# Patient Record
Sex: MALE | Race: WHITE | NOT HISPANIC OR LATINO | Employment: OTHER | URBAN - NONMETROPOLITAN AREA
[De-identification: names, ages, dates, MRNs, and addresses within clinical notes are randomized per-mention and may not be internally consistent; named-entity substitution may affect disease eponyms.]

---

## 2017-03-15 ENCOUNTER — OFFICE VISIT (OUTPATIENT)
Dept: CARDIOLOGY | Facility: CLINIC | Age: 72
End: 2017-03-15

## 2017-03-15 VITALS
DIASTOLIC BLOOD PRESSURE: 60 MMHG | WEIGHT: 166 LBS | HEART RATE: 41 BPM | SYSTOLIC BLOOD PRESSURE: 96 MMHG | HEIGHT: 70 IN | BODY MASS INDEX: 23.77 KG/M2

## 2017-03-15 DIAGNOSIS — Z79.899 MEDICATION MANAGEMENT: ICD-10-CM

## 2017-03-15 DIAGNOSIS — I48.0 PAROXYSMAL ATRIAL FIBRILLATION (HCC): Primary | ICD-10-CM

## 2017-03-15 DIAGNOSIS — R00.1 BRADYCARDIA: ICD-10-CM

## 2017-03-15 DIAGNOSIS — E11.9 CONTROLLED TYPE 2 DIABETES MELLITUS WITHOUT COMPLICATION, WITHOUT LONG-TERM CURRENT USE OF INSULIN (HCC): ICD-10-CM

## 2017-03-15 DIAGNOSIS — Z72.0 CHEWING TOBACCO USE: ICD-10-CM

## 2017-03-15 DIAGNOSIS — I49.5 SSS (SICK SINUS SYNDROME) (HCC): ICD-10-CM

## 2017-03-15 PROCEDURE — 93000 ELECTROCARDIOGRAM COMPLETE: CPT | Performed by: NURSE PRACTITIONER

## 2017-03-15 PROCEDURE — 99214 OFFICE O/P EST MOD 30 MIN: CPT | Performed by: NURSE PRACTITIONER

## 2017-03-15 NOTE — PROGRESS NOTES
Chief Complaint   Patient presents with   • Follow-up     Denies any cardiac problems.    • Med Refill     [facility]        Subjective       Pavel Claudio is a 71 y.o. male with a history of nonobstructive coronary artery disease and bradycardia. In 2015, he was seen in the office to establish cardiac care. His last stress test showed possible ischemia of the inferior wall and apex. He reports he underwent a cardiac catheterization and no significant blockage was noted. According to his office record, he has remained asymptomatic with bradycardia and hypotension. At his last office visit an extended monitor was placed. Sinus bradycardia was reported without symptoms. No PAF was noted. He continues to be managed conservatively. Anticoagulation therapy was not started due to bleeding risk and remaining in sinus    HPI         Cardiac History:    Past Surgical History   Procedure Laterality Date   • Echo - converted  04/16/2014     heart and vascular Dr FLORES EF 65%, mild AR   • Cardiovascular stress test  04/21/2014     Dr FLORES EF 58%, pos for ischemia    • Converted (historical) holter  03/04/2015     16 beat run of PAF baseline sinus dago at 50, no symptoms recorded   • Other surgical history  2015     Ecardio- sinus dago, continue observation       Current Outpatient Prescriptions   Medication Sig Dispense Refill   • alfuzosin (UROXATRAL) 10 MG 24 hr tablet Take 10 mg by mouth daily.     • dicyclomine (BENTYL) 20 MG tablet Take 20 mg by mouth daily.     • lansoprazole (PREVACID) 30 MG capsule Take 30 mg by mouth 2 (two) times a day.     • metFORMIN (GLUCOPHAGE) 500 MG tablet Take 500 mg by mouth daily with breakfast.     • Omega-3 Fatty Acids (OMEGA 3 500 PO) Take  by mouth. With Jesus red also     • pregabalin (LYRICA) 150 MG capsule Take 150 mg by mouth daily.     • sertraline (ZOLOFT) 100 MG tablet Take 100 mg by mouth daily.     • ZOLPIDEM TARTRATE PO Take 10 mg by mouth  "every night.     • apixaban (ELIQUIS) 5 MG tablet tablet Take 1 tablet by mouth 2 (Two) Times a Day. 60 tablet 0     No current facility-administered medications for this visit.        Review of patient's allergies indicates no known allergies.    Past Medical History   Diagnosis Date   • Anemia    • Anxiety    • BPH (benign prostatic hyperplasia)    • CAD (coronary artery disease)    • Cheekbone fracture      surgical repair    • Diabetes mellitus    • GERD (gastroesophageal reflux disease)    • H/O foot surgery      left foot   • History of hernia surgery    • PAF (paroxysmal atrial fibrillation)        Social History     Social History   • Marital status:      Spouse name: N/A   • Number of children: N/A   • Years of education: N/A     Occupational History   • Not on file.     Social History Main Topics   • Smoking status: Never Smoker   • Smokeless tobacco: Current User     Types: Chew   • Alcohol use No   • Drug use: No   • Sexual activity: Not on file     Other Topics Concern   • Not on file     Social History Narrative       Family History   Problem Relation Age of Onset   • Lung disease Mother        Review of Systems   Constitutional: Negative.    HENT: Negative.    Eyes: Negative.    Cardiovascular: Negative.    Gastrointestinal: Negative.    Endocrine: Negative.    Genitourinary: Negative.    Musculoskeletal: Negative.    Skin: Negative.    Neurological: Negative.    Hematological: Negative.    Psychiatric/Behavioral: Negative.        Diabetes- Yes  Thyroid-normal    Objective     Visit Vitals   • BP 96/60   • Pulse (!) 41   • Ht 70\" (177.8 cm)   • Wt 166 lb (75.3 kg)   • BMI 23.82 kg/m2       Physical Exam   Eyes: Pupils are equal, round, and reactive to light.   Neck: Neck supple. No JVD present.   Cardiovascular: S1 normal, S2 normal and normal pulses.  An irregular rhythm present. Bradycardia present.    Murmur heard.   Systolic murmur is present with a grade of 2/6   Pulmonary/Chest: Effort " normal and breath sounds normal.   Abdominal: Soft. Bowel sounds are normal.   Vitals reviewed.      ECG 12 Lead  Date/Time: 3/15/2017 12:38 PM  Performed by: KIM DIXON  Authorized by: KIM DIXON   Comparison: compared with previous ECG from 9/21/2016  Comparison to previous ECG: Sinus dago  Rhythm: atrial fibrillation  BPM: 41                  Assessment/Plan      Pavel was seen today for follow-up and med refill.    Diagnoses and all orders for this visit:    Paroxysmal atrial fibrillation  -     ECG 12 Lead    SSS (sick sinus syndrome)  -     ECG 12 Lead    Medication management    Bradycardia  -     ECG 12 Lead    Controlled type 2 diabetes mellitus without complication, without long-term current use of insulin    Chewing tobacco use    Other orders  -     apixaban (ELIQUIS) 5 MG tablet tablet; Take 1 tablet by mouth 2 (Two) Times a Day.        Mr. Claudio remains completely asymptomatic. His EKG today shows atrial fibrillation with slow ventricular response. Blood pressure is in his normal range. We discussed treatment options for atrial fibrillation. If arrhythmia persist cardioversion could be option but Mr. Claudio does not want to undergo intervention as long as he is asymptomatic. He wants to manage with medication.  Due to REFUGIO-VASc score of 2 recommend anticoagulation therapy for stoke prevention. Handouts were given regarding PAF and treatment. Samples of Eliquis 5 mg to take bid were given to him. [facility] medication and for management of labs. He understands to stop aspirin when starts Eliquis. No further cardiac testing ordered at this time. He understands to call for any problems or concerns.            Electronically signed by RAISSA Pham,  March 16, 2017 11:57 AM

## 2017-04-12 ENCOUNTER — TELEPHONE (OUTPATIENT)
Dept: CARDIOLOGY | Facility: CLINIC | Age: 72
End: 2017-04-12

## 2017-04-13 ENCOUNTER — TELEPHONE (OUTPATIENT)
Dept: CARDIOLOGY | Facility: CLINIC | Age: 72
End: 2017-04-13

## 2017-05-05 ENCOUNTER — DOCUMENTATION (OUTPATIENT)
Dept: CARDIOLOGY | Facility: CLINIC | Age: 72
End: 2017-05-05

## 2017-05-22 ENCOUNTER — TELEPHONE (OUTPATIENT)
Dept: CARDIOLOGY | Facility: CLINIC | Age: 72
End: 2017-05-22

## 2017-05-23 ENCOUNTER — TELEPHONE (OUTPATIENT)
Dept: CARDIOLOGY | Facility: CLINIC | Age: 72
End: 2017-05-23

## 2017-07-26 ENCOUNTER — DOCUMENTATION (OUTPATIENT)
Dept: CARDIOLOGY | Facility: CLINIC | Age: 72
End: 2017-07-26

## 2017-07-26 NOTE — PROGRESS NOTES
Received call from assistance for Eliquis stating that patient has been approved through December 2017.

## 2017-09-20 ENCOUNTER — OFFICE VISIT (OUTPATIENT)
Dept: CARDIOLOGY | Facility: CLINIC | Age: 72
End: 2017-09-20

## 2017-09-20 VITALS
DIASTOLIC BLOOD PRESSURE: 80 MMHG | BODY MASS INDEX: 23.77 KG/M2 | HEIGHT: 70 IN | SYSTOLIC BLOOD PRESSURE: 120 MMHG | WEIGHT: 166 LBS | HEART RATE: 42 BPM

## 2017-09-20 DIAGNOSIS — R00.1 SINUS BRADYCARDIA: ICD-10-CM

## 2017-09-20 DIAGNOSIS — E11.9 CONTROLLED TYPE 2 DIABETES MELLITUS WITHOUT COMPLICATION, WITHOUT LONG-TERM CURRENT USE OF INSULIN (HCC): ICD-10-CM

## 2017-09-20 DIAGNOSIS — Z79.01 CURRENT USE OF LONG TERM ANTICOAGULATION: ICD-10-CM

## 2017-09-20 DIAGNOSIS — I44.0 FIRST DEGREE AV BLOCK: ICD-10-CM

## 2017-09-20 DIAGNOSIS — I48.0 PAROXYSMAL ATRIAL FIBRILLATION (HCC): Primary | ICD-10-CM

## 2017-09-20 PROCEDURE — 93000 ELECTROCARDIOGRAM COMPLETE: CPT | Performed by: NURSE PRACTITIONER

## 2017-09-20 PROCEDURE — 99213 OFFICE O/P EST LOW 20 MIN: CPT | Performed by: NURSE PRACTITIONER

## 2017-09-20 RX ORDER — ZOLPIDEM TARTRATE 5 MG/1
5 TABLET ORAL NIGHTLY PRN
COMMUNITY
End: 2018-03-21 | Stop reason: DRUGHIGH

## 2017-09-20 NOTE — PROGRESS NOTES
"Chief Complaint   Patient presents with   • Follow-up     cardiac management, no recent labs, patient did not bring in medication list with visit, states nothing has changed. instructed to call back with list.   • Dizziness     \"at times\"       Subjective       Pavel Claudio is a 71 y.o. male  with a history of nonobstructive coronary artery disease and bradycardia. In 2015, he was seen in the office to establish cardiac care. His last stress test showed possible ischemia of the inferior wall and apex. He reports he underwent a cardiac catheterization and no significant blockage was noted. According to his office record, he has remained asymptomatic with bradycardia and hypotension. In 2016, an extended monitor was placed. Sinus bradycardia was reported without symptoms. No PAF was noted. He continues to be managed conservatively. At his last office visit his EKG showed atrial fibrillation. Eliquis was started. He did not want cardioversion if remained in atrial fib.   Today he comes to the office without cardiac complaints. He has recently undergone dental surgery with placement of implants. His appetite is stable and he is now able to eat more foods.     HPI         Cardiac History:    Past Surgical History:   Procedure Laterality Date   • CARDIOVASCULAR STRESS TEST  04/21/2014    Dr SANDRA GÓMEZ 58%, pos for ischemia    • CONVERTED (HISTORICAL) HOLTER  03/04/2015    16 beat run of PAF baseline sinus dago at 50, no symptoms recorded   • ECHO - CONVERTED  04/16/2014    heart and vascular Dr SANDRA GÓMEZ 65%, mild AR   • OTHER SURGICAL HISTORY  2015    Ecardio- sinus dago, continue observation       Current Outpatient Prescriptions   Medication Sig Dispense Refill   • alfuzosin (UROXATRAL) 10 MG 24 hr tablet Take 10 mg by mouth daily.     • apixaban (ELIQUIS) 5 MG tablet tablet Take 1 tablet by mouth 2 (Two) Times a Day. 28 tablet 0   • dicyclomine (BENTYL) 20 MG tablet Take 20 mg by mouth daily.     • lansoprazole (PREVACID) 30 " MG capsule Take 30 mg by mouth 2 (two) times a day.     • metFORMIN (GLUCOPHAGE) 500 MG tablet Take 500 mg by mouth daily with breakfast.     • Omega-3 Fatty Acids (OMEGA 3 500 PO) Take  by mouth. With Jesus red also     • pregabalin (LYRICA) 150 MG capsule Take 150 mg by mouth daily.     • sertraline (ZOLOFT) 100 MG tablet Take 100 mg by mouth daily.     • zolpidem (AMBIEN) 5 MG tablet Take 5 mg by mouth At Night As Needed for Sleep.       No current facility-administered medications for this visit.        Review of patient's allergies indicates no known allergies.    Past Medical History:   Diagnosis Date   • Anemia    • Anxiety    • BPH (benign prostatic hyperplasia)    • CAD (coronary artery disease)    • Cheekbone fracture     surgical repair    • Diabetes mellitus    • GERD (gastroesophageal reflux disease)    • H/O foot surgery     left foot   • History of hernia surgery    • PAF (paroxysmal atrial fibrillation)        Social History     Social History   • Marital status:      Spouse name: N/A   • Number of children: N/A   • Years of education: N/A     Occupational History   • Not on file.     Social History Main Topics   • Smoking status: Never Smoker   • Smokeless tobacco: Current User     Types: Chew      Comment: patient counseled on effectsof tobacco use on health.    • Alcohol use No   • Drug use: No   • Sexual activity: Not on file     Other Topics Concern   • Not on file     Social History Narrative       Family History   Problem Relation Age of Onset   • Lung disease Mother        Review of Systems   Constitution: Negative for decreased appetite and malaise/fatigue.   HENT: Negative for congestion, nosebleeds and sore throat.    Eyes: Negative for blurred vision.   Cardiovascular: Negative for chest pain, claudication, dyspnea on exertion, irregular heartbeat, leg swelling, near-syncope, palpitations and paroxysmal nocturnal dyspnea.   Respiratory: Negative for shortness of breath and sleep  "disturbances due to breathing.    Endocrine: Negative for cold intolerance and heat intolerance.   Hematologic/Lymphatic: Negative for adenopathy. Does not bruise/bleed easily.   Skin: Negative for itching and nail changes.   Musculoskeletal: Negative for arthritis and myalgias.   Gastrointestinal: Positive for constipation (at times, controls with stool softener). Negative for abdominal pain, dysphagia, heartburn and melena.   Genitourinary: Negative for frequency and hematuria.   Neurological: Negative for dizziness, light-headedness and seizures.   Psychiatric/Behavioral: Negative for altered mental status and memory loss. The patient does not have insomnia and is not nervous/anxious.    Allergic/Immunologic: Negative for hives.   Diabetes- Yes  Thyroid-normal    Objective     /80 (BP Location: Right arm)  Pulse (!) 42  Ht 70\" (177.8 cm)  Wt 166 lb (75.3 kg)  BMI 23.82 kg/m2    Physical Exam   Constitutional: He is oriented to person, place, and time. He appears well-nourished.   HENT:   Head: Normocephalic.   Eyes: Conjunctivae are normal. Pupils are equal, round, and reactive to light.   Neck: Normal range of motion. Carotid bruit is not present.   Cardiovascular: Regular rhythm, S1 normal, S2 normal and normal pulses.  Bradycardia present.    No murmur heard.  Pulmonary/Chest: Breath sounds normal. He has no wheezes. He has no rales.   Abdominal: Soft. Bowel sounds are normal. He exhibits no distension. There is no tenderness.   Musculoskeletal: Normal range of motion.   Neurological: He is alert and oriented to person, place, and time.   Skin: Skin is warm. No rash noted. No pallor.   Psychiatric: He has a normal mood and affect. His behavior is normal. Judgment and thought content normal.        ECG 12 Lead  Date/Time: 9/20/2017 9:57 AM  Performed by: KIM DIXON  Authorized by: KIM DXION   Comparison: compared with previous ECG from 3/15/2017  Comparison to previous ECG: Atrial fib 41 " bpm  Rhythm: sinus bradycardia  Rate: bradycardic  BPM: 42  Conduction: 1st degree  QRS axis: left  Clinical impression: abnormal ECG  Comments:  ms, QTc 436 ms                Assessment/Plan      Pavel was seen today for follow-up and dizziness.    Diagnoses and all orders for this visit:    Paroxysmal atrial fibrillation  -     ECG 12 Lead    Sinus bradycardia  -     ECG 12 Lead    First degree AV block  -     ECG 12 Lead    Current use of long term anticoagulation    Controlled type 2 diabetes mellitus without complication, without long-term current use of insulin      Pavel has normal blood pressure today. For evaluation of PAF an EKG done today. His rhythm is sinus with bradycardia for which he remains asymptomatic. He denies signs of bleeding or increased bruising with Eliquis. [facility] and report glucose controlled. He is maintaining good diet and weight is unchanged with normal BMI. No medication changes made today. No cardiac testing recommended at this time. Should any symptoms develop he agrees to call.              Electronically signed by RAISSA Pham,  September 20, 2017 10:08 AM

## 2017-12-05 ENCOUNTER — TELEPHONE (OUTPATIENT)
Dept: CARDIOLOGY | Facility: CLINIC | Age: 72
End: 2017-12-05

## 2017-12-05 NOTE — TELEPHONE ENCOUNTER
[facility], informing him that he needs to reapply for the program.

## 2018-02-08 ENCOUNTER — PRIOR AUTHORIZATION (OUTPATIENT)
Dept: CARDIOLOGY | Facility: CLINIC | Age: 73
End: 2018-02-08

## 2018-02-28 ENCOUNTER — TELEPHONE (OUTPATIENT)
Dept: CARDIOLOGY | Facility: CLINIC | Age: 73
End: 2018-02-28

## 2018-02-28 NOTE — TELEPHONE ENCOUNTER
Advise to keep apt as scheduled. He has had low heart rate, sinus dago for some time now and has remains completely asymptomatic. He is not on any blockers. Continue to monitor and advise patient to call for any symptoms. If patient reports concern then recommend Holter monitor. Thanks.

## 2018-02-28 NOTE — TELEPHONE ENCOUNTER
[facility] called to report patient was seen today.  He is completely asymptomatic, but states his HR is 30's-40's.  They are faxing EKG to our office.  They did a CBC today and will also fax.  I requested his last CMP, TSH if they have.    EKG has been faxed now.  I will put on your desk.  His next follow up is 3/21/18.

## 2018-02-28 NOTE — TELEPHONE ENCOUNTER
Patient aware of recommendations.  He denies any symptoms.  He will call our office if he develops any symptoms prior to his 3/21/18 follow up visit.

## 2018-03-21 ENCOUNTER — OFFICE VISIT (OUTPATIENT)
Dept: CARDIOLOGY | Facility: CLINIC | Age: 73
End: 2018-03-21

## 2018-03-21 VITALS
SYSTOLIC BLOOD PRESSURE: 90 MMHG | HEART RATE: 44 BPM | HEIGHT: 70 IN | BODY MASS INDEX: 24.34 KG/M2 | WEIGHT: 170 LBS | DIASTOLIC BLOOD PRESSURE: 58 MMHG

## 2018-03-21 DIAGNOSIS — E78.5 HYPERLIPIDEMIA, UNSPECIFIED HYPERLIPIDEMIA TYPE: ICD-10-CM

## 2018-03-21 DIAGNOSIS — Z79.899 MEDICATION MANAGEMENT: ICD-10-CM

## 2018-03-21 DIAGNOSIS — I49.5 SSS (SICK SINUS SYNDROME) (HCC): Primary | ICD-10-CM

## 2018-03-21 DIAGNOSIS — E11.9 CONTROLLED TYPE 2 DIABETES MELLITUS WITHOUT COMPLICATION, WITHOUT LONG-TERM CURRENT USE OF INSULIN (HCC): ICD-10-CM

## 2018-03-21 DIAGNOSIS — I48.0 PAROXYSMAL ATRIAL FIBRILLATION (HCC): ICD-10-CM

## 2018-03-21 DIAGNOSIS — R00.1 BRADYCARDIA: ICD-10-CM

## 2018-03-21 PROCEDURE — 99213 OFFICE O/P EST LOW 20 MIN: CPT | Performed by: NURSE PRACTITIONER

## 2018-03-21 PROCEDURE — 93000 ELECTROCARDIOGRAM COMPLETE: CPT | Performed by: NURSE PRACTITIONER

## 2018-03-21 RX ORDER — FERROUS SULFATE 325(65) MG
325 TABLET ORAL
COMMUNITY

## 2018-03-21 RX ORDER — TAMSULOSIN HYDROCHLORIDE 0.4 MG/1
1 CAPSULE ORAL NIGHTLY
COMMUNITY
End: 2018-09-19 | Stop reason: ALTCHOICE

## 2018-03-21 NOTE — PROGRESS NOTES
Chief Complaint   Patient presents with   • Follow-up     cardiac management, no recent labs   • Med Refill     request 90 day refill's on cardiac medication's. Patient brought medication list with visit.        Subjective       Pavel Claudio is a 72 y.o. male with a history of nonobstructive coronary artery disease and bradycardia.  In 2015, he was seen in the office to establish cardiac care. His last stress test showed possible ischemia of the inferior wall and apex. He reports he underwent a cardiac catheterization and no significant blockage was noted. According to his office record, he has remained asymptomatic with bradycardia and hypotension. In 2016, an extended monitor was placed. Sinus bradycardia was reported without symptoms. No PAF was noted. He was noted to be in atrial fib during his March 2017 visit and due to CHADsVASc of 2, Eliquis was initiated.  He preferred no cardioversion as long as he was asymptomatic.  He came in today for his follow up visit. His heart rate and blood pressure remain low but he remains completely asymptomatic.  He denies chest pain, shortness of breath, dizziness, or syncope.  He denies fatigue.  He isn't sure when last labs were checked but feels has been >6 months.       HPI         Cardiac History:    Past Surgical History:   Procedure Laterality Date   • CARDIOVASCULAR STRESS TEST  04/21/2014    Dr FLORES EF 58%, pos for ischemia    • CONVERTED (HISTORICAL) HOLTER  03/04/2015    16 beat run of PAF baseline sinus dago at 50, no symptoms recorded   • ECHO - CONVERTED  04/16/2014    heart and vascular Dr FLORES EF 65%, mild AR   • OTHER SURGICAL HISTORY  2015    Ecardio- sinus dago, continue observation       Current Outpatient Prescriptions   Medication Sig Dispense Refill   • apixaban (ELIQUIS) 5 MG tablet tablet Take 1 tablet by mouth Every 12 (Twelve) Hours. 60 tablet 11   • dicyclomine (BENTYL) 20 MG tablet Take 20 mg by mouth daily.     • ferrous sulfate 325 (65 FE) MG  tablet Take 325 mg by mouth Daily With Breakfast.     • lansoprazole (PREVACID) 30 MG capsule Take 30 mg by mouth 2 (two) times a day.     • metFORMIN (GLUCOPHAGE) 500 MG tablet Take 500 mg by mouth daily with breakfast.     • Omega-3 Fatty Acids (OMEGA 3 500 PO) Take  by mouth. With Jesus red also     • pregabalin (LYRICA) 150 MG capsule Take 150 mg by mouth daily.     • sertraline (ZOLOFT) 100 MG tablet Take 100 mg by mouth daily.     • tamsulosin (FLOMAX) 0.4 MG capsule 24 hr capsule Take 1 capsule by mouth Every Night.       No current facility-administered medications for this visit.        Review of patient's allergies indicates no known allergies.    Past Medical History:   Diagnosis Date   • Anemia    • Anxiety    • BPH (benign prostatic hyperplasia)    • CAD (coronary artery disease)    • Cheekbone fracture     surgical repair    • Diabetes mellitus    • GERD (gastroesophageal reflux disease)    • H/O foot surgery     left foot   • History of hernia surgery    • PAF (paroxysmal atrial fibrillation)        Social History     Social History   • Marital status:      Spouse name: N/A   • Number of children: N/A   • Years of education: N/A     Occupational History   • Not on file.     Social History Main Topics   • Smoking status: Never Smoker   • Smokeless tobacco: Current User     Types: Chew      Comment: patient counseled on effectsof tobacco use on health.    • Alcohol use No   • Drug use: No   • Sexual activity: Defer     Other Topics Concern   • Not on file     Social History Narrative   • No narrative on file       Family History   Problem Relation Age of Onset   • Lung disease Mother        Review of Systems   Constitution: Negative for decreased appetite, weakness, malaise/fatigue and weight loss.   HENT: Negative.    Eyes: Negative.    Cardiovascular: Negative for chest pain, dyspnea on exertion, leg swelling, palpitations and syncope.   Respiratory: Negative for cough and shortness of breath.   "  Endocrine: Negative.    Hematologic/Lymphatic: Negative for bleeding problem. Does not bruise/bleed easily.   Skin: Negative.    Musculoskeletal: Negative for myalgias.   Gastrointestinal: Negative for abdominal pain, dysphagia and melena.   Genitourinary: Negative for dysuria and hematuria.   Neurological: Negative for dizziness and headaches.   Psychiatric/Behavioral: Negative for altered mental status and depression.   Allergic/Immunologic: Negative.         Diabetes- Yes  Thyroid-normal    Objective     BP 90/58 (BP Location: Left arm)   Pulse (!) 44   Ht 177.8 cm (70\")   Wt 77.1 kg (170 lb)   BMI 24.39 kg/m²     Physical Exam   Constitutional: He is oriented to person, place, and time. He appears well-developed and well-nourished.   HENT:   Head: Normocephalic.   Eyes: Pupils are equal, round, and reactive to light.   Neck: Normal range of motion.   Cardiovascular: Regular rhythm and intact distal pulses.  Bradycardia present.    Murmur heard.  Pulmonary/Chest: Effort normal and breath sounds normal. No respiratory distress.   Abdominal: Soft. Bowel sounds are normal.   Musculoskeletal: Normal range of motion. He exhibits no edema.   Neurological: He is alert and oriented to person, place, and time.   Skin: Skin is warm and dry.   Psychiatric: He has a normal mood and affect.   Nursing note and vitals reviewed.       ECG 12 Lead  Date/Time: 3/21/2018 9:57 AM  Performed by: RAMIRO KANG  Authorized by: RAMIRO KANG   Rate: bradycardic  BPM: 41  Clinical impression: abnormal ECG  Comments: Junctional bradycardia at 41 bpm  P wave noted with short MO interval                   Assessment/Plan    He is hypotensive today.  Heart rate remains low.  EKG showed junctional bradycardia at 41 bpm.  Rhythm is regular but slow.  He remains completely asymptomatic.  We discussed different approaches including repeating holter monitor to evaluate bradycardia over 24-48 hours to attempt to document pause versus continue " conservative management.  He would like to continue monitoring for now.  We discussed the likelihood of pacemaker placement in the future.  He was strongly advised to report any change in symptoms or proceed to the nearest emergency room should he become dizzy, weak, or presyncopal.  He shows no sign of bleeding, continue Eliquis.  He was given a lab order to check thyroid, magnesium, CBC, CMP, lipids, and A1C.  He will bring to your office, and/or copy will be forwarded to you once available.  He was encouraged to increase his fluid intake.  Heart healthy diet low in saturated fat and sugar recommended.  Tobacco cessation encouraged.  We will see him back in six months or sooner if any symptoms develop.      Pavel was seen today for follow-up and med refill.    Diagnoses and all orders for this visit:    SSS (sick sinus syndrome)  -     Comprehensive Metabolic Panel; Future  -     Magnesium; Future  -     TSH; Future    Paroxysmal atrial fibrillation    Bradycardia  -     Magnesium; Future  -     TSH; Future    Controlled type 2 diabetes mellitus without complication, without long-term current use of insulin  -     Hemoglobin A1c; Future    Medication management  -     Comprehensive Metabolic Panel; Future  -     Lipid Panel; Future  -     CBC & Differential; Future  -     Hemoglobin A1c; Future    Hyperlipidemia, unspecified hyperlipidemia type  -     Lipid Panel; Future                    Electronically signed by RAISSA Huffman,  March 21, 2018 9:54 AM

## 2018-04-23 ENCOUNTER — TELEPHONE (OUTPATIENT)
Dept: CARDIOLOGY | Facility: CLINIC | Age: 73
End: 2018-04-23

## 2018-04-23 ENCOUNTER — OFFICE VISIT (OUTPATIENT)
Dept: CARDIOLOGY | Facility: CLINIC | Age: 73
End: 2018-04-23

## 2018-04-23 DIAGNOSIS — I49.5 SSS (SICK SINUS SYNDROME) (HCC): ICD-10-CM

## 2018-04-23 DIAGNOSIS — Z79.899 MEDICATION MANAGEMENT: ICD-10-CM

## 2018-04-23 DIAGNOSIS — I48.0 PAROXYSMAL ATRIAL FIBRILLATION (HCC): ICD-10-CM

## 2018-04-23 DIAGNOSIS — I20.8 STABLE ANGINA PECTORIS (HCC): Primary | ICD-10-CM

## 2018-04-23 DIAGNOSIS — E11.9 CONTROLLED TYPE 2 DIABETES MELLITUS WITHOUT COMPLICATION, WITHOUT LONG-TERM CURRENT USE OF INSULIN (HCC): ICD-10-CM

## 2018-04-23 DIAGNOSIS — Z72.0 CHEWING TOBACCO USE: ICD-10-CM

## 2018-04-23 DIAGNOSIS — R00.1 BRADYCARDIA: ICD-10-CM

## 2018-04-23 PROCEDURE — 99213 OFFICE O/P EST LOW 20 MIN: CPT | Performed by: NURSE PRACTITIONER

## 2018-04-23 PROCEDURE — 93000 ELECTROCARDIOGRAM COMPLETE: CPT | Performed by: NURSE PRACTITIONER

## 2018-04-23 NOTE — TELEPHONE ENCOUNTER
Patient's daughter, Connie, called back states that the patient has gotten ready and is wanting to come to office now. Advised that we could put patient on schedule for EKG and then make recommendations. Can you put patient on the schedule for 330 for EKG? Thank you!

## 2018-04-23 NOTE — PROGRESS NOTES
"No chief complaint on file.      Subjective       Pavel Claudio is a 72 y.o. male with a history of hypertension, bradycardia, PAF and positive stress test in 2014.  He apparently underwent cardiac cath by another cardiologist at an outlying hospital ([place]) which showed non-obstructive CAD.  He established care in our office in 2015.  At that time he was noted to be bradycardic which was completely asymptomatic.  Holter monitor and extended monitor have not shown any significant pause.  He had one short episode of PAF and was later noted to be in atrial fib during his March 2017 visit, and Eliquis was added as his CHADsVASc was 2.  Cardioversion was declined as he remained asymptomatic.  He was noted to be back in sinus at next follow up.  He was doing well at visit one month ago denying any symptoms.      His daughter called today reporting symptoms of weakness, fatigue, more lethargic and \"heartburn\" for the last two weeks.  Our nurse advised him to go the ER but he was reluctant and wanted to come here for further evaluation.  He reports for the last two weeks, he has a heartburn-like feeling which is midsternal, about 7-8 on 1-10 scale.  Occurs with minimal exertion, relieved by rest.  If he tries to do any work, he develops the discomfort.  After lying down for several minutes, the pain eases.  He has associated shortness of breath and diaphoresis.  A couple of time, he felt weak and shaky which was relieved after eating, but other times the symptoms persisted.  Glucose ranges from .  He has not used nitroglycerin.  Today around 12:30 pm, his symptoms became more significant and decided to seek treatment.  No recent labs available, order was given at last visit but not completed.  He is not on a statin.       HPI         Cardiac History:    Past Surgical History:   Procedure Laterality Date   • CARDIOVASCULAR STRESS TEST  04/21/2014    Dr OTTO- EF 58%, pos for ischemia    • CONVERTED (HISTORICAL) HOLTER  " 03/04/2015    16 beat run of PAF baseline sinus dago at 50, no symptoms recorded   • ECHO - CONVERTED  04/16/2014    heart and vascular Dr V- EF 65%, mild AR   • OTHER SURGICAL HISTORY  2015    Ecardio- sinus dago, continue observation       Current Outpatient Prescriptions   Medication Sig Dispense Refill   • apixaban (ELIQUIS) 5 MG tablet tablet Take 1 tablet by mouth Every 12 (Twelve) Hours. 60 tablet 11   • dicyclomine (BENTYL) 20 MG tablet Take 20 mg by mouth daily.     • ferrous sulfate 325 (65 FE) MG tablet Take 325 mg by mouth Daily With Breakfast.     • lansoprazole (PREVACID) 30 MG capsule Take 30 mg by mouth 2 (two) times a day.     • metFORMIN (GLUCOPHAGE) 500 MG tablet Take 500 mg by mouth daily with breakfast.     • Omega-3 Fatty Acids (OMEGA 3 500 PO) Take  by mouth. With Jesus red also     • pregabalin (LYRICA) 150 MG capsule Take 150 mg by mouth daily.     • sertraline (ZOLOFT) 100 MG tablet Take 100 mg by mouth daily.     • tamsulosin (FLOMAX) 0.4 MG capsule 24 hr capsule Take 1 capsule by mouth Every Night.       No current facility-administered medications for this visit.        Review of patient's allergies indicates no known allergies.    Past Medical History:   Diagnosis Date   • Anemia    • Anxiety    • BPH (benign prostatic hyperplasia)    • CAD (coronary artery disease)    • Cheekbone fracture     surgical repair    • Diabetes mellitus    • GERD (gastroesophageal reflux disease)    • H/O foot surgery     left foot   • History of hernia surgery    • PAF (paroxysmal atrial fibrillation)        Social History     Social History   • Marital status:      Spouse name: N/A   • Number of children: N/A   • Years of education: N/A     Occupational History   • Not on file.     Social History Main Topics   • Smoking status: Never Smoker   • Smokeless tobacco: Current User     Types: Chew      Comment: patient counseled on effectsof tobacco use on health.    • Alcohol use No   • Drug use: No   •  Sexual activity: Defer     Other Topics Concern   • Not on file     Social History Narrative   • No narrative on file       Family History   Problem Relation Age of Onset   • Lung disease Mother        Review of Systems   Constitution: Positive for diaphoresis, weakness and malaise/fatigue. Negative for weight loss.   HENT: Negative.    Eyes: Negative.    Cardiovascular: Positive for chest pain, dyspnea on exertion and near-syncope. Negative for leg swelling, orthopnea, palpitations and syncope.   Respiratory: Positive for shortness of breath (a/w chest pain ). Negative for cough.    Endocrine: Negative.    Hematologic/Lymphatic: Negative for bleeding problem. Does not bruise/bleed easily.   Skin: Negative.    Musculoskeletal: Negative for joint pain.   Gastrointestinal: Positive for heartburn. Negative for abdominal pain and melena.   Genitourinary: Negative for dysuria and hematuria.   Neurological: Positive for dizziness and light-headedness.   Psychiatric/Behavioral: Negative for altered mental status and depression.   Allergic/Immunologic: Negative.         Diabetes- Yes  Thyroid-normal, no labs for review     Objective     There were no vitals taken for this visit.    Physical Exam   Constitutional: He is oriented to person, place, and time. He appears well-developed and well-nourished.   HENT:   Head: Normocephalic.   Eyes: Pupils are equal, round, and reactive to light.   Neck: Normal range of motion.   Cardiovascular: Regular rhythm and intact distal pulses.  Bradycardia present.    Murmur heard.  Pulmonary/Chest: Effort normal and breath sounds normal. No respiratory distress. He has no wheezes. He has no rales.   Abdominal: Soft. Bowel sounds are normal.   Musculoskeletal: Normal range of motion. He exhibits no edema.   Neurological: He is alert and oriented to person, place, and time.   Skin: Skin is warm and dry. No pallor.   Psychiatric: His mood appears anxious.   Vitals reviewed.       ECG 12  Lead  Date/Time: 4/23/2018 4:42 PM  Performed by: RAMIRO KANG  Authorized by: RAMIRO KANG   Rhythm: sinus bradycardia  Rate: bradycardic  BPM: 41  Clinical impression: abnormal ECG  Comments: ME interval 222 ms   QTc 415 ms  No significant ST changes                   Assessment/Plan    Blood pressure is stable.  He is bradycardic with heart rate 41.  EKG showed sinus bradycardia with first degree AV block, no significant ST-T changes, similar to previous EKG.  We discussed his symptoms in detail which are highly suggestive of angina.  He is advised to go to the emergency room for AMI profile.  If the enzymes are negative, he is advised to call us in the morning and outpatient cardiac work up will be advised including stress and echocardiogram.  Consider adding long-acting nitrates to see if this helps with his chest pain.  We discussed the bradycardia again which is unchanged.  He does not want to repeat the holter monitor at this time.  He also may be having hypoglycemia events and was advised to eat at regular intervals, keep glucose available, followed by protein when needed.  Based on his ER visit, further recommendations will be made.    Diagnoses and all orders for this visit:    Stable angina pectoris    SSS (sick sinus syndrome)    Paroxysmal atrial fibrillation    Bradycardia    Controlled type 2 diabetes mellitus without complication, without long-term current use of insulin    Medication management    Chewing tobacco use    Other orders  -     ECG 12 Lead                    Electronically signed by RAISSA Huffman,  April 23, 2018 4:49 PM

## 2018-04-25 ENCOUNTER — TELEPHONE (OUTPATIENT)
Dept: CARDIOLOGY | Facility: CLINIC | Age: 73
End: 2018-04-25

## 2018-04-25 NOTE — TELEPHONE ENCOUNTER
Patient called today and requested an order for a stress test but patient wasn't sure if he wanted to [facility] or [facility]. Advised patient to call and let us know where he decides he would like to have his cardiac testing done. Patient verbalized understanding.

## 2018-04-25 NOTE — TELEPHONE ENCOUNTER
Alexandrea,    Can we contact Mr. Claudio and ask him how is he feeling?  ER work up was negative.      If the chest discomfort persists, recommend adding Imdur 30 mg daily and stress and echo.  If he agrees, I will place the order.

## 2018-07-12 ENCOUNTER — TELEPHONE (OUTPATIENT)
Dept: CARDIOLOGY | Facility: CLINIC | Age: 73
End: 2018-07-12

## 2018-07-12 DIAGNOSIS — R00.1 BRADYCARDIA: Primary | ICD-10-CM

## 2018-07-12 NOTE — TELEPHONE ENCOUNTER
Pt's daughter LM asking if the [facility] had been reviewed.  [facility]. They dropped the results off last week, it's under media for you to review. [facility] called [facility],once told him it looked good and the other time told him it was abnormal. Would like for you to review and see what your opinion is.

## 2018-07-12 NOTE — TELEPHONE ENCOUNTER
"Nuclear stress report reviewed.     No ischemia, no infarct, normal LV function.    Appears to have \"diaphragmatic soft tissue\" attenuation in the inferior wall felt to be artifact, not ischemic or infarct.     How is he doing?  "

## 2018-07-13 NOTE — TELEPHONE ENCOUNTER
OK.  With his history of significant bradycardia and junctional rhythm, recommend repeating holter to evaluate need for pacemaker. He was reluctant in the past, but if he is more symptomatic now, he may qualify.

## 2018-07-13 NOTE — TELEPHONE ENCOUNTER
Pt's daughter and pt aware of results. States he is still having a lot of fatigue. Denies any chest pain or SOB. He did say he was previously on iron tabs, due to a history of anemia, which he stopped a while back.  He is going to notify PCP who follows his labs.

## 2018-07-16 NOTE — TELEPHONE ENCOUNTER
Daughter called back, he would like to proceed with the monitor if you would please put the order in.

## 2018-07-16 NOTE — TELEPHONE ENCOUNTER
Spoke at length with daughter, she is going to discuss monitor with pt and call back if he is willing to proceed.

## 2018-07-18 ENCOUNTER — CLINICAL SUPPORT (OUTPATIENT)
Dept: CARDIOLOGY | Facility: CLINIC | Age: 73
End: 2018-07-18

## 2018-07-18 DIAGNOSIS — R00.1 BRADYCARDIA: ICD-10-CM

## 2018-07-18 PROCEDURE — 0296T PR EXT ECG > 48HR TO 21 DAY RCRD W/CONECT INTL RCRD: CPT | Performed by: INTERNAL MEDICINE

## 2018-07-31 ENCOUNTER — TELEPHONE (OUTPATIENT)
Dept: CARDIOLOGY | Facility: CLINIC | Age: 73
End: 2018-07-31

## 2018-07-31 ENCOUNTER — OUTSIDE FACILITY SERVICE (OUTPATIENT)
Dept: CARDIOLOGY | Facility: CLINIC | Age: 73
End: 2018-07-31

## 2018-07-31 PROCEDURE — 0298T PR EXT ECG > 48HR TO 21 DAY REVIEW AND INTERPRETATN: CPT | Performed by: INTERNAL MEDICINE

## 2018-08-01 NOTE — TELEPHONE ENCOUNTER
Discussed results and recommendations with pt's daughter. She is out of town today but will discuss with pt and family chelsey, will call back within the next couple days as to where pt would like to be referred for the PPM.

## 2018-08-02 NOTE — TELEPHONE ENCOUNTER
Ok, can we find out who he sees? Is it [facility]?    I cannot find [facility] in my referral list. Does have a specific dr?

## 2018-08-03 ENCOUNTER — TELEPHONE (OUTPATIENT)
Dept: CARDIOLOGY | Facility: CLINIC | Age: 73
End: 2018-08-03

## 2018-08-03 NOTE — TELEPHONE ENCOUNTER
Spoke with Elo and [facility] (Traci). She recommended faxing holter report to Marla Best,  for cardiology, for further arrangements.     Also will forward holter to Dr. Fierro in [place].     They will contact patient.

## 2018-09-18 NOTE — PROGRESS NOTES
Chief Complaint   Patient presents with   • Follow-up     For cardiac management. [facility] to discuss possible [facility]PM. Labs per PCP recently.    • Med Refill     Doesn't need refills at this time.    • Dizziness     Has not had for the last couple of weeks.        Subjective       Pavel Claudio is a 72 y.o. male  with a history of hypertension, bradycardia, PAF and positive stress test in 2014.  Cardiac cath by another cardiologist at an outlying hospital ([place]) which showed non-obstructive CAD.  He established care in our office in 2015.  He was bradycardic but completely asymptomatic.  Holter monitor and extended monitor did not show significant pause.  He had one short episode of PAF. In March 2017, EKG showed atrial fib. Eliquis was added due to CHADsVASc of 2. Follow up EKG was sinus dago.  In April 2018, he reported increased weakness, fatigue and chest pain. He went to ER where cardiac workup was reported as negative. On [facility]/18, [facility] with resulted reported as normal and LVEF 62%. On 7/31/18, a Holter monitor showed baseline sinus with 36 pauses noted, longest 2.3 seconds. [facility] to [facility]. He is scheduled to see cardiologist 9/28/18 to discuss ppm.     Today he comes to the office for a follow up visit. He is maintaining his normal activities but does admit to feeling more tired in general and having mild episodes of lightheadedness. No chest pain or palpitations noted. He continues NOAC without signs of bleeding. No recent medication changes noted.     HPI     Cardiac History:    Past Surgical History:   Procedure Laterality Date   • CARDIOVASCULAR STRESS TEST  04/21/2014    Dr OTTO- EF 58%, pos for ischemia    • CARDIOVASCULAR STRESS TEST  [facility]/26/2018    [facility] reported as normal. LVEF 62%   • CONVERTED (HISTORICAL) HOLTER  03/04/2015    16 beat run of PAF baseline sinus dago at 50, no symptoms recorded   • CONVERTED  (HISTORICAL) HOLTER  07/31/2018    72 hour- baseline sinus- minium 37 bpm and max 96 bpm, 36 pauses noted longest 2.3 sec, 4 beat SVT   • ECHO - CONVERTED  04/16/2014    heart and vascular Dr OTTO- EF 65%, mild AR   • OTHER SURGICAL HISTORY  2015    Ecardio- sinus dago, continue observation       Current Outpatient Prescriptions   Medication Sig Dispense Refill   • alfuzosin (UROXATRAL) 10 MG 24 hr tablet Take 10 mg by mouth Daily.     • apixaban (ELIQUIS) 5 MG tablet tablet Take 1 tablet by mouth Every 12 (Twelve) Hours. 180 tablet 3   • dicyclomine (BENTYL) 20 MG tablet Take 20 mg by mouth daily.     • ferrous sulfate 325 (65 FE) MG tablet Take 325 mg by mouth Daily With Breakfast.     • lansoprazole (PREVACID) 30 MG capsule Take 30 mg by mouth 2 (two) times a day.     • MEGARED OMEGA-3 KRILL OIL PO Take  by mouth Daily.     • metFORMIN (GLUCOPHAGE) 500 MG tablet Take 500 mg by mouth daily with breakfast.     • pregabalin (LYRICA) 150 MG capsule Take 150 mg by mouth daily.     • rOPINIRole (REQUIP) 0.25 MG tablet Take 0.25 mg by mouth Every Night. Take 1 hour before bedtime.     • sertraline (ZOLOFT) 100 MG tablet Take 100 mg by mouth daily.     • zolpidem (AMBIEN) 10 MG tablet Take 10 mg by mouth At Night As Needed for Sleep.       No current facility-administered medications for this visit.        Patient has no known allergies.    Past Medical History:   Diagnosis Date   • Anemia    • Anxiety    • BPH (benign prostatic hyperplasia)    • CAD (coronary artery disease)    • Cheekbone fracture (CMS/HCC)     surgical repair    • Diabetes mellitus (CMS/HCC)    • GERD (gastroesophageal reflux disease)    • H/O foot surgery     left foot   • History of hernia surgery    • PAF (paroxysmal atrial fibrillation) (CMS/HCC)        Social History     Social History   • Marital status:      Spouse name: N/A   • Number of children: N/A   • Years of education: N/A     Occupational History   • Not on file.     Social History  "Main Topics   • Smoking status: Never Smoker   • Smokeless tobacco: Current User     Types: Chew      Comment: patient counseled on effectsof tobacco use on health.    • Alcohol use No   • Drug use: No   • Sexual activity: Defer     Other Topics Concern   • Not on file     Social History Narrative   • No narrative on file       Family History   Problem Relation Age of Onset   • Lung disease Mother        Review of Systems   Constitution: Positive for malaise/fatigue. Negative for decreased appetite.   HENT: Negative for congestion, hoarse voice and nosebleeds.    Eyes: Negative for redness and visual disturbance.   Cardiovascular: Negative for chest pain, near-syncope and palpitations.   Respiratory: Negative for cough and shortness of breath.    Hematologic/Lymphatic: Negative for bleeding problem. Does not bruise/bleed easily.   Skin: Negative for color change, dry skin and itching.   Musculoskeletal: Negative for muscle cramps and muscle weakness.   Gastrointestinal: Negative for change in bowel habit, melena and nausea.   Genitourinary: Negative for dysuria, hematuria and nocturia.   Neurological: Positive for light-headedness. Negative for difficulty with concentration, dizziness (not last few weeks), headaches and numbness.   Psychiatric/Behavioral: Negative for memory loss. The patient does not have insomnia and is not nervous/anxious.         Objective     /54   Pulse (!) 42   Ht 177.8 cm (70\")   Wt 78 kg (172 lb)   BMI 24.68 kg/m²     Physical Exam   Constitutional: He is oriented to person, place, and time. He appears well-developed and well-nourished.   HENT:   Head: Normocephalic.   Eyes: Pupils are equal, round, and reactive to light. Conjunctivae are normal.   Neck: Normal range of motion. Neck supple. No JVD present. Carotid bruit is not present.   Cardiovascular: Regular rhythm, S1 normal, S2 normal and normal pulses.  Bradycardia present.    No murmur heard.  Pulmonary/Chest: Breath sounds " normal.   Abdominal: Soft. Bowel sounds are normal. He exhibits no distension. There is no tenderness.   Musculoskeletal: Normal range of motion.   Neurological: He is alert and oriented to person, place, and time.   Skin: Skin is warm.   Psychiatric: He has a normal mood and affect.          ECG 12 Lead  Date/Time: 9/19/2018 9:28 AM  Performed by: KIM DIXON  Authorized by: KIM DIXON   Comparison: compared with previous ECG from 4/23/2018  Comparison to previous ECG: Sinus dago with first degree AV block  Rhythm: sinus bradycardia  Rate: bradycardic  BPM: 40  QRS axis: left                  Assessment/Plan      Pavel was seen today for follow-up, med refill and dizziness.    Diagnoses and all orders for this visit:    SSS (sick sinus syndrome) (CMS/HCC)    Paroxysmal atrial fibrillation (CMS/HCC)    Bradycardia    Left axis deviation    Current use of long term anticoagulation    Other fatigue    Episodic lightheadedness    Other orders  -     ECG 12 Lead      EKG for management of PAF and medications shows sinus dago. The report of his recent Holter monitor was reviewed which shows episodes of significant pauses. [facility]  Im[facility]. His CHADVASc score is 2, managed with Eliquis. No signs of bleeding reported. Continue same.     His blood pressure is stable today. No medication changes made.     Patient's Body mass index is 24.68 kg/m². BMI is within normal parameters. No follow-up required. Heart healthy diet encouraged. His lipids are controlled by diet and will follow with you for assess of lipids per lab orders.     At this time, no cardiac testing advised. If he develops syncope or other cardiac symptoms, he understands to go to ER.     We will plan to see him back in 6 months or sooner for any cardiac concerns.          Electronically signed by RAISSA Pham,  September 19, 2018 10:37 AM

## 2018-09-19 ENCOUNTER — OFFICE VISIT (OUTPATIENT)
Dept: CARDIOLOGY | Facility: CLINIC | Age: 73
End: 2018-09-19

## 2018-09-19 VITALS
HEART RATE: 42 BPM | HEIGHT: 70 IN | BODY MASS INDEX: 24.62 KG/M2 | WEIGHT: 172 LBS | DIASTOLIC BLOOD PRESSURE: 54 MMHG | SYSTOLIC BLOOD PRESSURE: 124 MMHG

## 2018-09-19 DIAGNOSIS — R94.31 LEFT AXIS DEVIATION: ICD-10-CM

## 2018-09-19 DIAGNOSIS — R00.1 BRADYCARDIA: ICD-10-CM

## 2018-09-19 DIAGNOSIS — Z79.01 CURRENT USE OF LONG TERM ANTICOAGULATION: ICD-10-CM

## 2018-09-19 DIAGNOSIS — I48.0 PAROXYSMAL ATRIAL FIBRILLATION (HCC): ICD-10-CM

## 2018-09-19 DIAGNOSIS — R53.83 OTHER FATIGUE: ICD-10-CM

## 2018-09-19 DIAGNOSIS — R42 EPISODIC LIGHTHEADEDNESS: ICD-10-CM

## 2018-09-19 DIAGNOSIS — I49.5 SSS (SICK SINUS SYNDROME) (HCC): Primary | ICD-10-CM

## 2018-09-19 PROCEDURE — 99213 OFFICE O/P EST LOW 20 MIN: CPT | Performed by: NURSE PRACTITIONER

## 2018-09-19 PROCEDURE — 93000 ELECTROCARDIOGRAM COMPLETE: CPT | Performed by: NURSE PRACTITIONER

## 2018-09-19 RX ORDER — ALFUZOSIN HYDROCHLORIDE 10 MG/1
10 TABLET, EXTENDED RELEASE ORAL DAILY
COMMUNITY

## 2018-09-19 RX ORDER — ZOLPIDEM TARTRATE 10 MG/1
10 TABLET ORAL NIGHTLY PRN
Status: ON HOLD | COMMUNITY
End: 2019-03-19

## 2018-09-19 RX ORDER — ROPINIROLE 0.25 MG/1
0.75 TABLET, FILM COATED ORAL 2 TIMES DAILY
COMMUNITY

## 2019-02-18 ENCOUNTER — OFFICE VISIT (OUTPATIENT)
Dept: ORTHOPEDIC SURGERY | Facility: CLINIC | Age: 74
End: 2019-02-18

## 2019-02-18 ENCOUNTER — HOSPITAL ENCOUNTER (OUTPATIENT)
Dept: GENERAL RADIOLOGY | Facility: HOSPITAL | Age: 74
Discharge: HOME | End: 2019-02-18
Admitting: ORTHOPAEDIC SURGERY

## 2019-02-18 DIAGNOSIS — M25.511 RIGHT SHOULDER PAIN, UNSPECIFIED CHRONICITY: Primary | ICD-10-CM

## 2019-02-18 DIAGNOSIS — M75.121 COMPLETE TEAR OF RIGHT ROTATOR CUFF: ICD-10-CM

## 2019-02-18 PROCEDURE — 73030 X-RAY EXAM OF SHOULDER: CPT | Performed by: RADIOLOGY

## 2019-02-18 PROCEDURE — 99406 BEHAV CHNG SMOKING 3-10 MIN: CPT | Performed by: ORTHOPAEDIC SURGERY

## 2019-02-18 PROCEDURE — 99203 OFFICE O/P NEW LOW 30 MIN: CPT | Performed by: ORTHOPAEDIC SURGERY

## 2019-02-18 PROCEDURE — 73030 X-RAY EXAM OF SHOULDER: CPT

## 2019-02-18 RX ORDER — MULTIVIT WITH MINERALS/LUTEIN
250 TABLET ORAL DAILY
COMMUNITY
End: 2019-07-05

## 2019-02-18 NOTE — PROGRESS NOTES
New Patient Visit      Patient: Pavel Claudio  YOB: 1945  Date of Encounter: 02/18/2019        Chief Complaint:   Chief Complaint   Patient presents with   • Right Shoulder - Pain       HPI:   Pavel Claudio, 73 y.o. male, referred by Jacquelin Turk APRN presents today for evaluation of right shoulder pain.  He acknowledges right shoulder pain going back about 10 years and an episode August 31 of 2018 when a cow bumped him.  He had immediate pain and had difficulty lifting his arm afterwards.  His symptoms have not improved.  He reports a total of 3 steroid injections right shoulder over the past 10 years he got good response with all.  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  He does not complain of significant neck pain.  Denies numbness in his right arm.    Active Problem List:  Patient Active Problem List   Diagnosis   • SSS (sick sinus syndrome) (CMS/HCC)   • Diabetes mellitus type 2, controlled, without complications (CMS/HCC)   • Paroxysmal atrial fibrillation (CMS/HCC)   • Medication management   • Bradycardia   • Chewing tobacco use   • Left axis deviation   • Current use of long term anticoagulation       Past Medical History:  Past Medical History:   Diagnosis Date   • Anemia    • Anxiety    • BPH (benign prostatic hyperplasia)    • CAD (coronary artery disease)    • Cheekbone fracture (CMS/HCC)     surgical repair    • Diabetes mellitus (CMS/HCC)    • GERD (gastroesophageal reflux disease)    • H/O foot surgery     left foot   • History of hernia surgery    • PAF (paroxysmal atrial fibrillation) (CMS/HCC)        Past Surgical History:  Past Surgical History:   Procedure Laterality Date   • CARDIOVASCULAR STRESS TEST  04/21/2014    Dr OTTO- EF 58%, pos for ischemia    • CARDIOVASCULAR STRESS TEST  06/26/2018    [facility]- nuclear stress test reported as normal. LVEF 62%   • CONVERTED (HISTORICAL) HOLTER  03/04/2015    16 beat run of PAF baseline sinus dago at 50,  no symptoms recorded   • CONVERTED (HISTORICAL) HOLTER  07/31/2018    72 hour- baseline sinus- minium 37 bpm and max 96 bpm, 36 pauses noted longest 2.3 sec, 4 beat SVT   • ECHO - CONVERTED  04/16/2014    heart and vascular  V- EF 65%, mild AR   • OTHER SURGICAL HISTORY  2015    Ecardio- sinus dago, continue observation   • PACEMAKER IMPLANTATION         Family History:  Family History   Problem Relation Age of Onset   • Lung disease Mother    • Cancer Sister    • Cancer Brother        Social History:  Social History     Socioeconomic History   • Marital status:      Spouse name: Not on file   • Number of children: Not on file   • Years of education: Not on file   • Highest education level: Not on file   Social Needs   • Financial resource strain: Not on file   • Food insecurity - worry: Not on file   • Food insecurity - inability: Not on file   • Transportation needs - medical: Not on file   • Transportation needs - non-medical: Not on file   Occupational History   • Not on file   Tobacco Use   • Smoking status: Never Smoker   • Smokeless tobacco: Current User     Types: Chew   • Tobacco comment: patient counseled on effectsof tobacco use on health.    Substance and Sexual Activity   • Alcohol use: No   • Drug use: No   • Sexual activity: Defer   Other Topics Concern   • Not on file   Social History Narrative   • Not on file     Patient's Body mass index is 23.68 kg/m². BMI is within normal parameters. No follow-up required..  I advised Pavel of the risks of continuing to use tobacco, and I provided him with tobacco cessation educational materials in the After Visit Summary.     During this visit, I spent 3 minutes counseling the patient regarding tobacco cessation.      Medications:  Current Outpatient Medications   Medication Sig Dispense Refill   • alfuzosin (UROXATRAL) 10 MG 24 hr tablet Take 10 mg by mouth Daily.     • apixaban (ELIQUIS) 5 MG tablet tablet Take 1 tablet by mouth Every 12 (Twelve)  "Hours. 180 tablet 3   • dicyclomine (BENTYL) 20 MG tablet Take 20 mg by mouth daily.     • ferrous sulfate 325 (65 FE) MG tablet Take 325 mg by mouth Daily With Breakfast.     • lansoprazole (PREVACID) 30 MG capsule Take 30 mg by mouth 2 (two) times a day.     • MEGARED OMEGA-3 KRILL OIL PO Take  by mouth Daily.     • metFORMIN (GLUCOPHAGE) 500 MG tablet Take 500 mg by mouth daily with breakfast.     • pregabalin (LYRICA) 150 MG capsule Take 150 mg by mouth daily.     • rOPINIRole (REQUIP) 0.25 MG tablet Take 0.25 mg by mouth Every Night. Take 1 hour before bedtime.     • sertraline (ZOLOFT) 100 MG tablet Take 100 mg by mouth daily.     • vitamin C (ASCORBIC ACID) 250 MG tablet Take 250 mg by mouth Daily.     • zolpidem (AMBIEN) 10 MG tablet Take 10 mg by mouth At Night As Needed for Sleep.       No current facility-administered medications for this visit.        Allergies:  No Known Allergies    Review of Systems:   Review of Systems   Constitutional: Negative.    HENT: Negative.    Eyes: Negative.    Respiratory: Negative.    Cardiovascular: Negative.    Gastrointestinal: Negative.    Endocrine: Negative.    Genitourinary: Negative.    Musculoskeletal: Positive for arthralgias.   Skin: Negative.    Allergic/Immunologic: Negative.    Neurological: Positive for tremors.   Hematological: Negative.    Psychiatric/Behavioral: Negative.        Physical Exam:   Physical Exam  GENERAL: 73 y.o. male, alert and oriented X 3 in no acute distress.   Visit Vitals  /72   Pulse 68   Ht 177.8 cm (70\")   Wt 74.8 kg (165 lb)   BMI 23.68 kg/m²     Musculoskeletal:   Right shoulder evaluation reveals moderate tenderness over the acromioclavicular joint and associated pain with crossarm abduction.  He has moderate weakness with Karen's maneuver and associated pain good strength with external rotation but slightly diminished compared to the left.  He has moderate wasting involving the infraspinatus muscle, mild wasting involving " the supraspinatus muscle.  His range of motion is full passively.    Radiology/Labs:   Radiographs of right shoulder taken today remarkable for significant osteoarthritis acromioclavicular joint with complete loss of joint space and inferior osteophyte formation.  He has narrowing of subchondral space with sloping acromion.    Assessment & Plan:   73 y.o. male clinical findings today strongly suspicious for full-thickness rotator cuff tear likely with retraction.  The significant infraspinatus and supraspinatus wasting suggest chronicity to his tendon injury.  I suspect he may have completed a partial tear in December.  We discussed his options and unfortunately he is unable to have MRI due to metal both in his cheek and his ankle as well as a pacemaker.  I think it is reasonable to consider rotator cuff repair but he is given a 50% chance of success given his significant muscle wasting and narrowing of the subacromial space.  He wishes to give this some consideration.  He is currently on Eliquis has atrial fibrillation he will therefore require cardiac clearance but he is scheduled to see his cardiologist mid March.  He will follow-up in this office if he wishes to consider surgery.      ICD-10-CM ICD-9-CM   1. Right shoulder pain, unspecified chronicity M25.511 719.41   2. Complete tear of right rotator cuff M75.121 727.61               Cc:   Jacquelin Turk APRN          Scribed for Tylor Berry MD by Luisa Berry RN.2:24 PM 02/18/2019

## 2019-02-20 VITALS
HEIGHT: 70 IN | BODY MASS INDEX: 23.62 KG/M2 | HEART RATE: 68 BPM | WEIGHT: 165 LBS | SYSTOLIC BLOOD PRESSURE: 128 MMHG | DIASTOLIC BLOOD PRESSURE: 72 MMHG

## 2019-02-22 ENCOUNTER — TELEPHONE (OUTPATIENT)
Dept: CARDIOLOGY | Facility: CLINIC | Age: 74
End: 2019-02-22

## 2019-02-22 NOTE — TELEPHONE ENCOUNTER
Received fax from Dr. Berry for cardiac clearance for patient to have an arthroscopy right shoulder, possible open rotator cuff repair, excision lateral clavicle. Patient is on Eliquis. According to our records, I do not see where patient has had any stenting.       Fax [phone]  Attn: Elin

## 2019-03-11 ENCOUNTER — OFFICE VISIT (OUTPATIENT)
Dept: ORTHOPEDIC SURGERY | Facility: CLINIC | Age: 74
End: 2019-03-11

## 2019-03-11 VITALS
SYSTOLIC BLOOD PRESSURE: 114 MMHG | BODY MASS INDEX: 23.62 KG/M2 | WEIGHT: 165 LBS | HEIGHT: 70 IN | DIASTOLIC BLOOD PRESSURE: 60 MMHG | HEART RATE: 58 BPM

## 2019-03-11 DIAGNOSIS — M75.121 COMPLETE TEAR OF RIGHT ROTATOR CUFF: Primary | ICD-10-CM

## 2019-03-11 DIAGNOSIS — M75.41 IMPINGEMENT SYNDROME OF RIGHT SHOULDER: ICD-10-CM

## 2019-03-11 DIAGNOSIS — M19.011 OSTEOARTHRITIS OF RIGHT AC (ACROMIOCLAVICULAR) JOINT: ICD-10-CM

## 2019-03-11 PROCEDURE — 99214 OFFICE O/P EST MOD 30 MIN: CPT | Performed by: ORTHOPAEDIC SURGERY

## 2019-03-11 NOTE — PROGRESS NOTES
History and Physical      Patient: Pavel Claudio  YOB: 1945  Date of Encounter: 03/11/2019      Chief Complaint:   Chief Complaint   Patient presents with   • Right Shoulder - Follow-up       HPI:   Pavel Claudio, 73 y.o. male, seen today in follow-up with his right shoulder complaints.  He has had pain in his shoulders for up to 10 years but had an episode in August 2018 when he had immediate pain after an injury and difficulty lifting his arm.  He remained the same over the past 6 months with difficulty lifting his arm and very little improvement.  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  He has no significant neck pain nor does he experience numbness in his arm.  He localizes pain to the lateral aspect of his arm.  He has been unable to undergo MRI of his right shoulder.  He has had some time to consider proposed surgery to his shoulder and presents today wishing to schedule.  His medical history is remarkable for type 2 diabetes mellitus, sick sinus syndrome, bradycardia and left axis deviation.  He remains anticoagulated due to atrial fibrillation.  He takes Eliquis on a daily basis.  He has been evaluated by his cardiologist and has been cleared with relatively low risk.  He had a pacemaker placed November 2018.    Active Problem List:  Patient Active Problem List   Diagnosis   • SSS (sick sinus syndrome) (CMS/HCC)   • Diabetes mellitus type 2, controlled, without complications (CMS/HCC)   • Paroxysmal atrial fibrillation (CMS/HCC)   • Medication management   • Bradycardia   • Chewing tobacco use   • Left axis deviation   • Current use of long term anticoagulation   • Complete tear of right rotator cuff   • Osteoarthritis of right AC (acromioclavicular) joint   • Impingement syndrome of right shoulder       Past Medical History:  Past Medical History:   Diagnosis Date   • Anemia    • Anxiety    • BPH (benign prostatic hyperplasia)    • CAD (coronary artery disease)     • Cheekbone fracture (CMS/HCC)     surgical repair    • Diabetes mellitus (CMS/HCC)    • GERD (gastroesophageal reflux disease)    • H/O foot surgery     left foot   • History of hernia surgery    • PAF (paroxysmal atrial fibrillation) (CMS/HCC)        Past Surgical History:  Past Surgical History:   Procedure Laterality Date   • CARDIOVASCULAR STRESS TEST  04/21/2014    Dr SANDRA GÓMEZ 58%, pos for ischemia    • CARDIOVASCULAR STRESS TEST  06/26/2018    [facility]- nuclear stress test reported as normal. LVEF 62%   • CONVERTED (HISTORICAL) HOLTER  03/04/2015    16 beat run of PAF baseline sinus dago at 50, no symptoms recorded   • CONVERTED (HISTORICAL) HOLTER  07/31/2018    72 hour- baseline sinus- minium 37 bpm and max 96 bpm, 36 pauses noted longest 2.3 sec, 4 beat SVT   • ECHO - CONVERTED  04/16/2014    heart and vascular Dr SANDRA GÓMEZ 65%, mild AR   • OTHER SURGICAL HISTORY  2015    Ecardio- sinus dago, continue observation   • PACEMAKER IMPLANTATION         Family History:  Family History   Problem Relation Age of Onset   • Lung disease Mother    • Cancer Sister    • Cancer Brother        Social History:  Social History     Socioeconomic History   • Marital status:      Spouse name: Not on file   • Number of children: Not on file   • Years of education: Not on file   • Highest education level: Not on file   Social Needs   • Financial resource strain: Not on file   • Food insecurity - worry: Not on file   • Food insecurity - inability: Not on file   • Transportation needs - medical: Not on file   • Transportation needs - non-medical: Not on file   Occupational History   • Not on file   Tobacco Use   • Smoking status: Never Smoker   • Smokeless tobacco: Current User     Types: Chew   • Tobacco comment: patient counseled on effectsof tobacco use on health.    Substance and Sexual Activity   • Alcohol use: No   • Drug use: No   • Sexual activity: Defer   Other Topics Concern   • Not on file   Social History  Narrative   • Not on file     Body mass index is 23.68 kg/m².    Medications:  Current Outpatient Medications   Medication Sig Dispense Refill   • alfuzosin (UROXATRAL) 10 MG 24 hr tablet Take 10 mg by mouth Daily.     • apixaban (ELIQUIS) 5 MG tablet tablet Take 1 tablet by mouth Every 12 (Twelve) Hours. 180 tablet 3   • dicyclomine (BENTYL) 20 MG tablet Take 20 mg by mouth daily.     • ferrous sulfate 325 (65 FE) MG tablet Take 325 mg by mouth Daily With Breakfast.     • lansoprazole (PREVACID) 30 MG capsule Take 30 mg by mouth 2 (two) times a day.     • MEGARED OMEGA-3 KRILL OIL PO Take  by mouth Daily.     • metFORMIN (GLUCOPHAGE) 500 MG tablet Take 500 mg by mouth daily with breakfast.     • pregabalin (LYRICA) 150 MG capsule Take 150 mg by mouth daily.     • rOPINIRole (REQUIP) 0.25 MG tablet Take 0.25 mg by mouth Every Night. Take 1 hour before bedtime.     • sertraline (ZOLOFT) 100 MG tablet Take 100 mg by mouth daily.     • vitamin C (ASCORBIC ACID) 250 MG tablet Take 250 mg by mouth Daily.     • zolpidem (AMBIEN) 10 MG tablet Take 10 mg by mouth At Night As Needed for Sleep.       No current facility-administered medications for this visit.        Allergies:  No Known Allergies    Review of Systems:   Review of Systems   Constitutional: Negative.    HENT: Negative.    Eyes: Negative.    Respiratory: Negative.    Cardiovascular: Negative.    Gastrointestinal: Negative.    Endocrine: Negative.    Genitourinary: Negative.    Musculoskeletal: Positive for arthralgias.   Skin: Negative.    Allergic/Immunologic: Negative.    Neurological: Negative.    Hematological: Negative.    Psychiatric/Behavioral: Negative.        Physical Exam:   Physical Exam   Constitutional: He is oriented to person, place, and time. No distress.   HENT:   Head: Normocephalic.   Right Ear: External ear normal.   Left Ear: External ear normal.   Nose: Nose normal.   Eyes: Conjunctivae and EOM are normal. Right eye exhibits no discharge.  "Left eye exhibits no discharge.   Neck: Normal range of motion. Neck supple.   Cardiovascular: Normal rate, regular rhythm, normal heart sounds and intact distal pulses.   No murmur heard.  Pulmonary/Chest: Effort normal and breath sounds normal. No respiratory distress. He has no wheezes.   Abdominal: Soft. He exhibits no distension. There is no guarding.   Musculoskeletal:     Left shoulder evaluation reveals good strength with internal and external rotation.  He can abduct against resistance at 90 degrees with moderate weakness and moderate pain.  Acromioclavicular joint is moderately tender.  He has moderate pain with crossarm abduction.  He demonstrates moderate wasting of the supraspinatus and infraspinatus muscles.  He demonstrates full range of motion passively and normal neurovascular status.   Neurological: He is alert and oriented to person, place, and time.   Skin: Skin is warm and dry. Capillary refill takes less than 2 seconds. He is not diaphoretic.   Psychiatric: He has a normal mood and affect. His behavior is normal. Judgment and thought content normal.   Nursing note and vitals reviewed.    GENERAL: 73 y.o. male, alert and oriented X 3 in no acute distress.   Visit Vitals  /60   Pulse 58   Ht 177.8 cm (70\")   Wt 74.8 kg (165 lb)   BMI 23.68 kg/m²       Radiology/Labs:   Previous radiographs of right shoulder remarkable for narrowing of the acromioclavicular joint with complete loss of joint space and small inferior osteophyte formation with narrowing of the subacromial space and downsloping acromion.      Assessment & Plan:   73 y.o. male with history of injury right shoulder 6 months ago, unable to undergo MRI of his right shoulder but demonstrating some continuity of rotator cuff tendon upon exam.  He was offered referral to consider reverse shoulder arthroplasty and he is not interested in that at all.  He wishes to make one attempt at repairing his rotator cuff tendon. He is aware of his " cardiac and other health risks and even with this, he wishes to proceed with proposed surgery.  We will begin with arthroscopic evaluation and proceed with open rotator cuff repair if he is felt to have repairable tendon.  He may undergo excision of his lateral clavicle, that will be an intraoperative decision.  Surgery is scheduled [facility], arthroscopy right shoulder with probable open rotator cuff repair acromioplasty and excision of the lateral clavicle.      ICD-10-CM ICD-9-CM   1. Complete tear of right rotator cuff M75.121 727.61   2. Osteoarthritis of right AC (acromioclavicular) joint M19.011 715.91   3. Impingement syndrome of right shoulder M75.41 726.2           Cc:   Jacquelin Turk APRN          Scribed for Tylor Berry MD by Luisa Berry RN.1:41 PM 03/11/2019

## 2019-03-11 NOTE — PROGRESS NOTES
Follow-up Visit         Patient: Pavel Claudio  YOB: 1945  Date of Encounter: 03/11/2019      Chief  Complaint:   Chief Complaint   Patient presents with   • Right Shoulder - Follow-up         HPI:  Pavel Claudio, 73 y.o. male     Medical History:  Patient Active Problem List   Diagnosis   • SSS (sick sinus syndrome) (CMS/HCC)   • Diabetes mellitus type 2, controlled, without complications (CMS/HCC)   • Paroxysmal atrial fibrillation (CMS/HCC)   • Medication management   • Bradycardia   • Chewing tobacco use   • Left axis deviation   • Current use of long term anticoagulation     Past Medical History:   Diagnosis Date   • Anemia    • Anxiety    • BPH (benign prostatic hyperplasia)    • CAD (coronary artery disease)    • Cheekbone fracture (CMS/HCC)     surgical repair    • Diabetes mellitus (CMS/HCC)    • GERD (gastroesophageal reflux disease)    • H/O foot surgery     left foot   • History of hernia surgery    • PAF (paroxysmal atrial fibrillation) (CMS/[facility])        Social History:  Social History     Socioeconomic History   • Marital status:      Spouse name: Not on file   • Number of children: Not on file   • Years of education: Not on file   • Highest education level: Not on file   Social Needs   • Financial resource strain: Not on file   • Food insecurity - worry: Not on file   • Food insecurity - inability: Not on file   • Transportation needs - medical: Not on file   • Transportation needs - non-medical: Not on file   Occupational History   • Not on file   Tobacco Use   • Smoking status: Never Smoker   • Smokeless tobacco: Current User     Types: Chew   • Tobacco comment: patient counseled on effectsof tobacco use on health.    Substance and Sexual Activity   • Alcohol use: No   • Drug use: No   • Sexual activity: Defer   Other Topics Concern   • Not on file   Social History Narrative   • Not on file       Surgical History:  Past Surgical History:   Procedure Laterality Date   •  CARDIOVASCULAR STRESS TEST  04/21/2014    Dr FLORES EF 58%, pos for ischemia    • CARDIOVASCULAR STRESS TEST  06/26/2018    [facility]- nuclear stress test reported as normal. LVEF 62%   • CONVERTED (HISTORICAL) HOLTER  03/04/2015    16 beat run of PAF baseline sinus dago at 50, no symptoms recorded   • CONVERTED (HISTORICAL) HOLTER  07/31/2018    72 hour- baseline sinus- minium 37 bpm and max 96 bpm, 36 pauses noted longest 2.3 sec, 4 beat SVT   • ECHO - CONVERTED  04/16/2014    heart and vascular Dr FLORES EF 65%, mild AR   • OTHER SURGICAL HISTORY  2015    Ecardio- sinus dago, continue observation   • PACEMAKER IMPLANTATION         Radiology:       Examination:       Assessment & Plan:   73 y.o. male         No diagnosis found.          Cc:  Jacquelin Turk APRN      Scribed for Tylor Berry MD by Luisa Berry RN.1:31 PM 03/11/2019

## 2019-03-13 ENCOUNTER — TELEPHONE (OUTPATIENT)
Dept: ORTHOPEDIC SURGERY | Facility: CLINIC | Age: 74
End: 2019-03-13

## 2019-03-13 PROBLEM — M75.121 COMPLETE TEAR OF RIGHT ROTATOR CUFF: Status: ACTIVE | Noted: 2019-03-13

## 2019-03-13 PROBLEM — M75.41 IMPINGEMENT SYNDROME OF RIGHT SHOULDER: Status: ACTIVE | Noted: 2019-03-13

## 2019-03-13 PROBLEM — M19.011 OSTEOARTHRITIS OF RIGHT AC (ACROMIOCLAVICULAR) JOINT: Status: ACTIVE | Noted: 2019-03-13

## 2019-03-13 RX ORDER — CEFAZOLIN SODIUM 2 G/50ML
2 SOLUTION INTRAVENOUS ONCE
Status: CANCELLED | OUTPATIENT
Start: 2019-03-19 | End: 2019-03-13

## 2019-03-15 ENCOUNTER — APPOINTMENT (OUTPATIENT)
Dept: PREADMISSION TESTING | Facility: HOSPITAL | Age: 74
End: 2019-03-15

## 2019-03-15 DIAGNOSIS — M75.121 COMPLETE TEAR OF RIGHT ROTATOR CUFF: ICD-10-CM

## 2019-03-15 DIAGNOSIS — M75.41 IMPINGEMENT SYNDROME OF RIGHT SHOULDER: ICD-10-CM

## 2019-03-15 DIAGNOSIS — M19.011 OSTEOARTHRITIS OF RIGHT AC (ACROMIOCLAVICULAR) JOINT: ICD-10-CM

## 2019-03-15 LAB
ANION GAP SERPL CALCULATED.3IONS-SCNC: 10.5 MMOL/L
BUN BLD-MCNC: 14 MG/DL (ref 8–23)
BUN/CREAT SERPL: 15.6 (ref 7–25)
CALCIUM SPEC-SCNC: 8.9 MG/DL (ref 8.6–10.5)
CHLORIDE SERPL-SCNC: 106 MMOL/L (ref 98–107)
CO2 SERPL-SCNC: 21.5 MMOL/L (ref 22–29)
CREAT BLD-MCNC: 0.9 MG/DL (ref 0.76–1.27)
DEPRECATED RDW RBC AUTO: 56.5 FL (ref 37–54)
ERYTHROCYTE [DISTWIDTH] IN BLOOD BY AUTOMATED COUNT: 17.9 % (ref 12.3–15.4)
GFR SERPL CREATININE-BSD FRML MDRD: 83 ML/MIN/1.73
GLUCOSE BLD-MCNC: 123 MG/DL (ref 65–99)
HCT VFR BLD AUTO: 40.8 % (ref 37.5–51)
HGB BLD-MCNC: 13.1 G/DL (ref 13–17.7)
MCH RBC QN AUTO: 28.2 PG (ref 26.6–33)
MCHC RBC AUTO-ENTMCNC: 32.1 G/DL (ref 31.5–35.7)
MCV RBC AUTO: 87.7 FL (ref 79–97)
PLATELET # BLD AUTO: 228 10*3/MM3 (ref 140–450)
PMV BLD AUTO: 11.3 FL (ref 6–12)
POTASSIUM BLD-SCNC: 3.8 MMOL/L (ref 3.5–5.2)
RBC # BLD AUTO: 4.65 10*6/MM3 (ref 4.14–5.8)
SODIUM BLD-SCNC: 138 MMOL/L (ref 136–145)
WBC NRBC COR # BLD: 6.51 10*3/MM3 (ref 3.4–10.8)

## 2019-03-15 PROCEDURE — 93005 ELECTROCARDIOGRAM TRACING: CPT

## 2019-03-15 PROCEDURE — 85027 COMPLETE CBC AUTOMATED: CPT | Performed by: ANESTHESIOLOGY

## 2019-03-15 PROCEDURE — 93010 ELECTROCARDIOGRAM REPORT: CPT | Performed by: INTERNAL MEDICINE

## 2019-03-15 PROCEDURE — 36415 COLL VENOUS BLD VENIPUNCTURE: CPT

## 2019-03-15 PROCEDURE — 80048 BASIC METABOLIC PNL TOTAL CA: CPT | Performed by: ANESTHESIOLOGY

## 2019-03-15 NOTE — DISCHARGE INSTRUCTIONS
0800---3/19/19  ARRIVAL TIME  TAKE the following medications the morning of surgery:  All heart or blood pressure medications    HOLD all diabetic medications the morning of surgery as ordered by physician.    Please discontinue all blood thinners and anticoagulants (except aspirin) prior to surgery as per your surgeon and cardiologist instructions.  Aspirin may be continued up to the day prior to surgery.     CHLORHEXIDINE CLOTHS GIVEN WITH INSTRUCTIONS AND FORM TO RETURN TO HOSPITAL    General Instructions:  · Do not eat or drink after midnight: includes water, mints, or gum. You may brush your teeth.  Dental appliances that are removable must be taken out day of surgery.  · Do not smoke, chew tobacco, or drink alcohol.  · Bring medications in original bottles, any inhalers and if applicable your C-PAP/BI-PAP machine.  · Bring any papers given to you in the doctor's office.  · Wear clean comfortable clothes and socks.  · Do not wear contact lenses or make-up. Bring a case for your glasses if applicable.  · Bring crutches or walker if applicable.  · Leave all other valuables and jewelry at home.    If you were given a blood bank ID arm band remember to bring it with you the day of surgery.    Preventing a Surgical Site Infection:  Shower the night before surgery (unless instructed other wise) using a fresh bar of anti-bacterial soap (such as Dial) and clean washcloth. Dry with a clean towel and dress in clean clothing.  For 2 to 3 days before surgery, avoid shaving with a razor near where you will have surgery because the razor can irritate skin and make it easier to develop an infection. Ask your surgeon if you will be receiving antibiotics prior to surgery.  Make sure you, your family, and all healthcare providers clear their hands with soap and water or an alcohol-based hand  before caring for you or your wound.  If at all possible, quit smoking as many days before surgery as you can.    Day of  surgery:  Upon arrival, a Pre-op nurse and Anesthesiologist will review your health history, obtain vital signs, and answer questions you may have. The only belongings needed at this time will be your home medications and if applicable your C-PAP/BI-PAP machine. If you are staying overnight your family can leave the rest of your belongings in the car and bring them to your room later. A Pre-op nurse will start an IV and you may receive medication in preparation for surgery, including something to help you relax. Your family will be able to see you in the Pre-op area. While you are in surgery your family should notify the waiting room  if they leave the waiting room area and provide a contact phone number.    Please be aware that surgery does come with discomfort. We want to make every effort to control your discomfort so please discuss any uncontrolled symptoms with your nurse. Your doctor will most likely have prescribed pain medications.    If you are going home after surgery you will receive individualized written care instructions before being discharged. A responsible adult must drive you to and from the hospital on the day of surgery and stay with you for 24 hours.    If you are staying overnight following surgery, you will be transported to your hospital room following the recovery period.  [facility] has all private rooms.    If you have any questions please call Pre-Admission Testing at [phone].  Deductibles and co-payments are collected on the day of service. Please be prepared to pay the required co-pay, deductible or deposit on the day of service as defined by your plan.  Educational brochure given to patient regarding pain control and mindful breathing.

## 2019-03-18 ENCOUNTER — TELEPHONE (OUTPATIENT)
Dept: ORTHOPEDIC SURGERY | Facility: CLINIC | Age: 74
End: 2019-03-18

## 2019-03-18 NOTE — TELEPHONE ENCOUNTER
Patient was notified concerning his SX arrival time 3-19-19@8:00. A voicemail was left [phone] with the arrival time and instructions to call me back and let me know they received my message.

## 2019-03-19 ENCOUNTER — ANESTHESIA (OUTPATIENT)
Dept: PERIOP | Facility: HOSPITAL | Age: 74
End: 2019-03-19

## 2019-03-19 ENCOUNTER — HOSPITAL ENCOUNTER (OUTPATIENT)
Facility: HOSPITAL | Age: 74
Discharge: HOME | End: 2019-03-20
Attending: ORTHOPAEDIC SURGERY | Admitting: ORTHOPAEDIC SURGERY

## 2019-03-19 ENCOUNTER — PREP FOR SURGERY (OUTPATIENT)
Dept: OTHER | Facility: HOSPITAL | Age: 74
End: 2019-03-19

## 2019-03-19 ENCOUNTER — APPOINTMENT (OUTPATIENT)
Dept: GENERAL RADIOLOGY | Facility: HOSPITAL | Age: 74
End: 2019-03-19

## 2019-03-19 ENCOUNTER — ANESTHESIA EVENT (OUTPATIENT)
Dept: PERIOP | Facility: HOSPITAL | Age: 74
End: 2019-03-19

## 2019-03-19 DIAGNOSIS — M75.41 IMPINGEMENT SYNDROME OF RIGHT SHOULDER: ICD-10-CM

## 2019-03-19 DIAGNOSIS — M19.011 OSTEOARTHRITIS OF RIGHT AC (ACROMIOCLAVICULAR) JOINT: ICD-10-CM

## 2019-03-19 DIAGNOSIS — M75.121 COMPLETE TEAR OF RIGHT ROTATOR CUFF: ICD-10-CM

## 2019-03-19 LAB — GLUCOSE BLDC GLUCOMTR-MCNC: 107 MG/DL (ref 70–130)

## 2019-03-19 PROCEDURE — A9270 NON-COVERED ITEM OR SERVICE: HCPCS | Performed by: ORTHOPAEDIC SURGERY

## 2019-03-19 PROCEDURE — L3660 SO 8 AB RSTR CAN/WEB PRE OTS: HCPCS | Performed by: ORTHOPAEDIC SURGERY

## 2019-03-19 PROCEDURE — 63710000001 FERROUS SULFATE 325 (65 FE) MG TABLET: Performed by: ORTHOPAEDIC SURGERY

## 2019-03-19 PROCEDURE — 25010000002 HYDROMORPHONE PER 4 MG: Performed by: ORTHOPAEDIC SURGERY

## 2019-03-19 PROCEDURE — 63710000001 SERTRALINE 50 MG TABLET: Performed by: ORTHOPAEDIC SURGERY

## 2019-03-19 PROCEDURE — 94799 UNLISTED PULMONARY SVC/PX: CPT

## 2019-03-19 PROCEDURE — 63710000001 APIXABAN 5 MG TABLET: Performed by: ORTHOPAEDIC SURGERY

## 2019-03-19 PROCEDURE — 25010000002 ONDANSETRON PER 1 MG: Performed by: NURSE ANESTHETIST, CERTIFIED REGISTERED

## 2019-03-19 PROCEDURE — 25010000002 FENTANYL CITRATE (PF) 100 MCG/2ML SOLUTION: Performed by: NURSE ANESTHETIST, CERTIFIED REGISTERED

## 2019-03-19 PROCEDURE — 63710000001 PANTOPRAZOLE 40 MG TABLET DELAYED-RELEASE: Performed by: ORTHOPAEDIC SURGERY

## 2019-03-19 PROCEDURE — G0378 HOSPITAL OBSERVATION PER HR: HCPCS

## 2019-03-19 PROCEDURE — 25010000002 ROPIVACAINE PER 1 MG: Performed by: ANESTHESIOLOGY

## 2019-03-19 PROCEDURE — 63710000001 METFORMIN 500 MG TABLET: Performed by: ORTHOPAEDIC SURGERY

## 2019-03-19 PROCEDURE — 25010000002 PROPOFOL 10 MG/ML EMULSION: Performed by: NURSE ANESTHETIST, CERTIFIED REGISTERED

## 2019-03-19 PROCEDURE — 82962 GLUCOSE BLOOD TEST: CPT

## 2019-03-19 PROCEDURE — 63710000001 ROPINIROLE 0.25 MG TABLET: Performed by: ORTHOPAEDIC SURGERY

## 2019-03-19 PROCEDURE — 23412 REPAIR ROTATOR CUFF CHRONIC: CPT | Performed by: ORTHOPAEDIC SURGERY

## 2019-03-19 PROCEDURE — C1713 ANCHOR/SCREW BN/BN,TIS/BN: HCPCS | Performed by: ORTHOPAEDIC SURGERY

## 2019-03-19 PROCEDURE — 63710000001 OXYCODONE-ACETAMINOPHEN 7.5-325 MG TABLET: Performed by: ORTHOPAEDIC SURGERY

## 2019-03-19 PROCEDURE — 25010000003 CEFAZOLIN SODIUM-DEXTROSE 2-3 GM-%(50ML) RECONSTITUTED SOLUTION: Performed by: ORTHOPAEDIC SURGERY

## 2019-03-19 PROCEDURE — 25010000003 CEFAZOLIN PER 500 MG: Performed by: ORTHOPAEDIC SURGERY

## 2019-03-19 PROCEDURE — 63710000001 TAMSULOSIN 0.4 MG CAPSULE: Performed by: ORTHOPAEDIC SURGERY

## 2019-03-19 PROCEDURE — 63710000001 VITAMIN C 500 MG TABLET: Performed by: ORTHOPAEDIC SURGERY

## 2019-03-19 PROCEDURE — 63710000001 DICYCLOMINE PER 20 MG: Performed by: ORTHOPAEDIC SURGERY

## 2019-03-19 DEVICE — SUT/ANCH BIOSWIVELOCK/SP VNT 5.5X24.5MM: Type: IMPLANTABLE DEVICE | Site: SHOULDER | Status: FUNCTIONAL

## 2019-03-19 RX ORDER — OXYCODONE AND ACETAMINOPHEN 7.5; 325 MG/1; MG/1
2 TABLET ORAL EVERY 4 HOURS PRN
Status: DISCONTINUED | OUTPATIENT
Start: 2019-03-19 | End: 2019-03-20 | Stop reason: HOSPADM

## 2019-03-19 RX ORDER — HYDROMORPHONE HYDROCHLORIDE 1 MG/ML
0.5 INJECTION, SOLUTION INTRAMUSCULAR; INTRAVENOUS; SUBCUTANEOUS
Status: DISCONTINUED | OUTPATIENT
Start: 2019-03-19 | End: 2019-03-20 | Stop reason: HOSPADM

## 2019-03-19 RX ORDER — MAGNESIUM HYDROXIDE 1200 MG/15ML
LIQUID ORAL AS NEEDED
Status: DISCONTINUED | OUTPATIENT
Start: 2019-03-19 | End: 2019-03-19 | Stop reason: HOSPADM

## 2019-03-19 RX ORDER — FERROUS SULFATE 325(65) MG
325 TABLET ORAL
Status: DISCONTINUED | OUTPATIENT
Start: 2019-03-19 | End: 2019-03-20 | Stop reason: HOSPADM

## 2019-03-19 RX ORDER — ONDANSETRON 2 MG/ML
INJECTION INTRAMUSCULAR; INTRAVENOUS AS NEEDED
Status: DISCONTINUED | OUTPATIENT
Start: 2019-03-19 | End: 2019-03-19 | Stop reason: SURG

## 2019-03-19 RX ORDER — IPRATROPIUM BROMIDE AND ALBUTEROL SULFATE 2.5; .5 MG/3ML; MG/3ML
3 SOLUTION RESPIRATORY (INHALATION) ONCE AS NEEDED
Status: DISCONTINUED | OUTPATIENT
Start: 2019-03-19 | End: 2019-03-19 | Stop reason: HOSPADM

## 2019-03-19 RX ORDER — FAMOTIDINE 10 MG/ML
INJECTION, SOLUTION INTRAVENOUS AS NEEDED
Status: DISCONTINUED | OUTPATIENT
Start: 2019-03-19 | End: 2019-03-19 | Stop reason: SURG

## 2019-03-19 RX ORDER — SODIUM CHLORIDE, SODIUM LACTATE, POTASSIUM CHLORIDE, CALCIUM CHLORIDE 600; 310; 30; 20 MG/100ML; MG/100ML; MG/100ML; MG/100ML
100 INJECTION, SOLUTION INTRAVENOUS CONTINUOUS
Status: DISCONTINUED | OUTPATIENT
Start: 2019-03-19 | End: 2019-03-20 | Stop reason: HOSPADM

## 2019-03-19 RX ORDER — ZOLPIDEM TARTRATE 5 MG/1
10 TABLET ORAL NIGHTLY PRN
Status: CANCELLED | OUTPATIENT
Start: 2019-03-19

## 2019-03-19 RX ORDER — ROPINIROLE 0.25 MG/1
0.25 TABLET, FILM COATED ORAL NIGHTLY
Status: DISCONTINUED | OUTPATIENT
Start: 2019-03-19 | End: 2019-03-20 | Stop reason: HOSPADM

## 2019-03-19 RX ORDER — FENTANYL CITRATE 50 UG/ML
50 INJECTION, SOLUTION INTRAMUSCULAR; INTRAVENOUS
Status: DISCONTINUED | OUTPATIENT
Start: 2019-03-19 | End: 2019-03-19 | Stop reason: HOSPADM

## 2019-03-19 RX ORDER — PROPOFOL 10 MG/ML
VIAL (ML) INTRAVENOUS AS NEEDED
Status: DISCONTINUED | OUTPATIENT
Start: 2019-03-19 | End: 2019-03-19 | Stop reason: SURG

## 2019-03-19 RX ORDER — TAMSULOSIN HYDROCHLORIDE 0.4 MG/1
0.4 CAPSULE ORAL NIGHTLY
Status: DISCONTINUED | OUTPATIENT
Start: 2019-03-19 | End: 2019-03-20 | Stop reason: HOSPADM

## 2019-03-19 RX ORDER — ASCORBIC ACID 500 MG
250 TABLET ORAL DAILY
Status: DISCONTINUED | OUTPATIENT
Start: 2019-03-19 | End: 2019-03-20 | Stop reason: HOSPADM

## 2019-03-19 RX ORDER — OXYCODONE HYDROCHLORIDE AND ACETAMINOPHEN 5; 325 MG/1; MG/1
1 TABLET ORAL ONCE AS NEEDED
Status: DISCONTINUED | OUTPATIENT
Start: 2019-03-19 | End: 2019-03-19 | Stop reason: HOSPADM

## 2019-03-19 RX ORDER — ONDANSETRON 2 MG/ML
4 INJECTION INTRAMUSCULAR; INTRAVENOUS ONCE AS NEEDED
Status: DISCONTINUED | OUTPATIENT
Start: 2019-03-19 | End: 2019-03-19 | Stop reason: HOSPADM

## 2019-03-19 RX ORDER — FENTANYL CITRATE 50 UG/ML
INJECTION, SOLUTION INTRAMUSCULAR; INTRAVENOUS AS NEEDED
Status: DISCONTINUED | OUTPATIENT
Start: 2019-03-19 | End: 2019-03-19 | Stop reason: SURG

## 2019-03-19 RX ORDER — SODIUM CHLORIDE 0.9 % (FLUSH) 0.9 %
3 SYRINGE (ML) INJECTION EVERY 12 HOURS SCHEDULED
Status: DISCONTINUED | OUTPATIENT
Start: 2019-03-19 | End: 2019-03-19 | Stop reason: HOSPADM

## 2019-03-19 RX ORDER — PANTOPRAZOLE SODIUM 40 MG/1
40 TABLET, DELAYED RELEASE ORAL EVERY MORNING
Status: DISCONTINUED | OUTPATIENT
Start: 2019-03-19 | End: 2019-03-20 | Stop reason: HOSPADM

## 2019-03-19 RX ORDER — MIDAZOLAM HYDROCHLORIDE 1 MG/ML
1 INJECTION INTRAMUSCULAR; INTRAVENOUS
Status: DISCONTINUED | OUTPATIENT
Start: 2019-03-19 | End: 2019-03-19 | Stop reason: HOSPADM

## 2019-03-19 RX ORDER — DICYCLOMINE HCL 20 MG
20 TABLET ORAL DAILY
Status: DISCONTINUED | OUTPATIENT
Start: 2019-03-19 | End: 2019-03-20 | Stop reason: HOSPADM

## 2019-03-19 RX ORDER — ZOLPIDEM TARTRATE 5 MG/1
5 TABLET ORAL NIGHTLY PRN
Status: DISCONTINUED | OUTPATIENT
Start: 2019-03-19 | End: 2019-03-20 | Stop reason: HOSPADM

## 2019-03-19 RX ORDER — MIDAZOLAM HYDROCHLORIDE 1 MG/ML
2 INJECTION INTRAMUSCULAR; INTRAVENOUS
Status: DISCONTINUED | OUTPATIENT
Start: 2019-03-19 | End: 2019-03-19 | Stop reason: HOSPADM

## 2019-03-19 RX ORDER — SODIUM CHLORIDE 0.9 % (FLUSH) 0.9 %
3-10 SYRINGE (ML) INJECTION AS NEEDED
Status: DISCONTINUED | OUTPATIENT
Start: 2019-03-19 | End: 2019-03-19 | Stop reason: HOSPADM

## 2019-03-19 RX ORDER — MEPERIDINE HYDROCHLORIDE 25 MG/ML
12.5 INJECTION INTRAMUSCULAR; INTRAVENOUS; SUBCUTANEOUS
Status: DISCONTINUED | OUTPATIENT
Start: 2019-03-19 | End: 2019-03-19 | Stop reason: HOSPADM

## 2019-03-19 RX ORDER — ZOLPIDEM TARTRATE 5 MG/1
5 TABLET ORAL NIGHTLY PRN
COMMUNITY
End: 2019-07-01 | Stop reason: ALTCHOICE

## 2019-03-19 RX ORDER — CEFAZOLIN SODIUM 2 G/50ML
2 SOLUTION INTRAVENOUS ONCE
Status: COMPLETED | OUTPATIENT
Start: 2019-03-19 | End: 2019-03-19

## 2019-03-19 RX ORDER — PREGABALIN 75 MG/1
150 CAPSULE ORAL 2 TIMES DAILY PRN
Status: DISCONTINUED | OUTPATIENT
Start: 2019-03-19 | End: 2019-03-20 | Stop reason: HOSPADM

## 2019-03-19 RX ORDER — NALOXONE HCL 0.4 MG/ML
0.4 VIAL (ML) INJECTION
Status: DISCONTINUED | OUTPATIENT
Start: 2019-03-19 | End: 2019-03-20 | Stop reason: HOSPADM

## 2019-03-19 RX ORDER — SODIUM CHLORIDE, SODIUM LACTATE, POTASSIUM CHLORIDE, CALCIUM CHLORIDE 600; 310; 30; 20 MG/100ML; MG/100ML; MG/100ML; MG/100ML
125 INJECTION, SOLUTION INTRAVENOUS CONTINUOUS
Status: DISCONTINUED | OUTPATIENT
Start: 2019-03-19 | End: 2019-03-19

## 2019-03-19 RX ORDER — LIDOCAINE HYDROCHLORIDE 20 MG/ML
INJECTION, SOLUTION INFILTRATION; PERINEURAL AS NEEDED
Status: DISCONTINUED | OUTPATIENT
Start: 2019-03-19 | End: 2019-03-19 | Stop reason: SURG

## 2019-03-19 RX ORDER — ROPIVACAINE HYDROCHLORIDE 5 MG/ML
INJECTION, SOLUTION EPIDURAL; INFILTRATION; PERINEURAL
Status: COMPLETED | OUTPATIENT
Start: 2019-03-19 | End: 2019-03-19

## 2019-03-19 RX ADMIN — FENTANYL CITRATE 50 MCG: 50 INJECTION INTRAMUSCULAR; INTRAVENOUS at 15:02

## 2019-03-19 RX ADMIN — SERTRALINE 100 MG: 50 TABLET, FILM COATED ORAL at 17:07

## 2019-03-19 RX ADMIN — ROPINIROLE HYDROCHLORIDE 0.25 MG: 0.25 TABLET, FILM COATED ORAL at 21:42

## 2019-03-19 RX ADMIN — SODIUM CHLORIDE, POTASSIUM CHLORIDE, SODIUM LACTATE AND CALCIUM CHLORIDE: 600; 310; 30; 20 INJECTION, SOLUTION INTRAVENOUS at 12:10

## 2019-03-19 RX ADMIN — PROPOFOL 70 MG: 10 INJECTION, EMULSION INTRAVENOUS at 12:10

## 2019-03-19 RX ADMIN — DICYCLOMINE HYDROCHLORIDE 20 MG: 20 TABLET ORAL at 17:07

## 2019-03-19 RX ADMIN — FENTANYL CITRATE 50 MCG: 50 INJECTION INTRAMUSCULAR; INTRAVENOUS at 14:52

## 2019-03-19 RX ADMIN — APIXABAN 5 MG: 5 TABLET, FILM COATED ORAL at 21:42

## 2019-03-19 RX ADMIN — FENTANYL CITRATE 50 MCG: 50 INJECTION INTRAMUSCULAR; INTRAVENOUS at 12:45

## 2019-03-19 RX ADMIN — OXYCODONE HYDROCHLORIDE AND ACETAMINOPHEN 2 TABLET: 7.5; 325 TABLET ORAL at 21:41

## 2019-03-19 RX ADMIN — LIDOCAINE HYDROCHLORIDE 60 MG: 20 INJECTION, SOLUTION INFILTRATION; PERINEURAL at 12:10

## 2019-03-19 RX ADMIN — PANTOPRAZOLE SODIUM 40 MG: 40 TABLET, DELAYED RELEASE ORAL at 17:07

## 2019-03-19 RX ADMIN — ONDANSETRON 4 MG: 2 INJECTION, SOLUTION INTRAMUSCULAR; INTRAVENOUS at 12:10

## 2019-03-19 RX ADMIN — CEFAZOLIN 2 G: 1 INJECTION, POWDER, FOR SOLUTION INTRAMUSCULAR; INTRAVENOUS; PARENTERAL at 21:45

## 2019-03-19 RX ADMIN — SODIUM CHLORIDE, POTASSIUM CHLORIDE, SODIUM LACTATE AND CALCIUM CHLORIDE: 600; 310; 30; 20 INJECTION, SOLUTION INTRAVENOUS at 12:49

## 2019-03-19 RX ADMIN — HYDROMORPHONE HYDROCHLORIDE 0.5 MG: 1 INJECTION, SOLUTION INTRAMUSCULAR; INTRAVENOUS; SUBCUTANEOUS at 22:57

## 2019-03-19 RX ADMIN — ROPIVACAINE HYDROCHLORIDE 20 MG: 5 INJECTION, SOLUTION EPIDURAL; INFILTRATION; PERINEURAL at 09:44

## 2019-03-19 RX ADMIN — FAMOTIDINE 20 MG: 10 INJECTION, SOLUTION INTRAVENOUS at 12:10

## 2019-03-19 RX ADMIN — HYDROMORPHONE HYDROCHLORIDE 0.5 MG: 1 INJECTION, SOLUTION INTRAMUSCULAR; INTRAVENOUS; SUBCUTANEOUS at 15:55

## 2019-03-19 RX ADMIN — OXYCODONE HYDROCHLORIDE AND ACETAMINOPHEN 250 MG: 500 TABLET ORAL at 17:07

## 2019-03-19 RX ADMIN — SODIUM CHLORIDE, POTASSIUM CHLORIDE, SODIUM LACTATE AND CALCIUM CHLORIDE 100 ML/HR: 600; 310; 30; 20 INJECTION, SOLUTION INTRAVENOUS at 15:56

## 2019-03-19 RX ADMIN — FENTANYL CITRATE 50 MCG: 50 INJECTION INTRAMUSCULAR; INTRAVENOUS at 14:25

## 2019-03-19 RX ADMIN — FERROUS SULFATE TAB 325 MG (65 MG ELEMENTAL FE) 325 MG: 325 (65 FE) TAB at 17:07

## 2019-03-19 RX ADMIN — SODIUM CHLORIDE, POTASSIUM CHLORIDE, SODIUM LACTATE AND CALCIUM CHLORIDE 100 ML/HR: 600; 310; 30; 20 INJECTION, SOLUTION INTRAVENOUS at 21:41

## 2019-03-19 RX ADMIN — CEFAZOLIN SODIUM 2 G: 2 SOLUTION INTRAVENOUS at 12:19

## 2019-03-19 RX ADMIN — METFORMIN HYDROCHLORIDE 500 MG: 500 TABLET ORAL at 17:07

## 2019-03-19 RX ADMIN — TAMSULOSIN HYDROCHLORIDE 0.4 MG: 0.4 CAPSULE ORAL at 21:42

## 2019-03-19 NOTE — ANESTHESIA PROCEDURE NOTES
Airway  Urgency: elective    Date/Time: 3/19/2019 12:15 PM  Airway not difficult    General Information and Staff    Patient location during procedure: OR  Anesthesiologist: Yovanny Booker MD  CRNA: Rocío Browne CRNA    Indications and Patient Condition  Indications for airway management: airway protection    Preoxygenated: yes  MILS maintained throughout  Mask difficulty assessment: 0 - not attempted    Final Airway Details  Final airway type: endotracheal airway      Successful airway: ETT  Cuffed: yes   Successful intubation technique: direct laryngoscopy  Endotracheal tube insertion site: oral  Blade: Marin  Blade size: 2  ETT size (mm): 7.5  Cormack-Lehane Classification: grade I - full view of glottis  Placement verified by: chest auscultation and capnometry   Measured from: lips  Number of attempts at approach: 1

## 2019-03-19 NOTE — ANESTHESIA PREPROCEDURE EVALUATION
Anesthesia Evaluation     no history of anesthetic complications:  NPO Solid Status: > 8 hours  NPO Liquid Status: > 8 hours           Airway   Mallampati: II  TM distance: >3 FB  Neck ROM: full  No difficulty expected  Dental - normal exam   (+) edentulous    Pulmonary - normal exam   Cardiovascular - normal exam    (+) dysrhythmias Bradycardia,       Neuro/Psych  (+) psychiatric history Anxiety,     GI/Hepatic/Renal/Endo    (+)  GERD,  diabetes mellitus,     Musculoskeletal     Abdominal  - normal exam    Bowel sounds: normal.   Substance History      OB/GYN          Other                        Anesthesia Plan    ASA 3     general     intravenous induction   Anesthetic plan, all risks, benefits, and alternatives have been provided, discussed and informed consent has been obtained with: patient.

## 2019-03-19 NOTE — H&P
History and Physical        Patient: Pavel Claudio  YOB: 1945  Date of Encounter: 03/11/2019        Chief Complaint:       Chief Complaint   Patient presents with   • Right Shoulder - Follow-up         HPI:   Pavel Claudio, 73 y.o. male, seen today in follow-up with his right shoulder complaints.  He has had pain in his shoulders for up to 10 years but had an episode in August 2018 when he had immediate pain after an injury and difficulty lifting his arm.  He remained the same over the past 6 months with difficulty lifting his arm and very little improvement.  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  He has no significant neck pain nor does he experience numbness in his arm.  He localizes pain to the lateral aspect of his arm.  He has been unable to undergo MRI of his right shoulder.  He has had some time to consider proposed surgery to his shoulder and presents today wishing to schedule.  His medical history is remarkable for type 2 diabetes mellitus, sick sinus syndrome, bradycardia and left axis deviation.  He remains anticoagulated due to atrial fibrillation.  He takes Eliquis on a daily basis.  He has been evaluated by his cardiologist and has been cleared with relatively low risk.  He had a pacemaker placed November 2018.     Active Problem List:      Patient Active Problem List   Diagnosis   • SSS (sick sinus syndrome) (CMS/HCC)   • Diabetes mellitus type 2, controlled, without complications (CMS/HCC)   • Paroxysmal atrial fibrillation (CMS/HCC)   • Medication management   • Bradycardia   • Chewing tobacco use   • Left axis deviation   • Current use of long term anticoagulation   • Complete tear of right rotator cuff   • Osteoarthritis of right AC (acromioclavicular) joint   • Impingement syndrome of right shoulder         Past Medical History:  Medical History        Past Medical History:   Diagnosis Date   • Anemia     • Anxiety     • BPH (benign  prostatic hyperplasia)     • CAD (coronary artery disease)     • Cheekbone fracture (CMS/HCC)       surgical repair    • Diabetes mellitus (CMS/HCC)     • GERD (gastroesophageal reflux disease)     • H/O foot surgery       left foot   • History of hernia surgery     • PAF (paroxysmal atrial fibrillation) (CMS/HCC)              Past Surgical History:  Surgical History         Past Surgical History:   Procedure Laterality Date   • CARDIOVASCULAR STRESS TEST   04/21/2014     Dr FLORES EF 58%, pos for ischemia    • CARDIOVASCULAR STRESS TEST   06/26/2018     [facility]- nuclear stress test reported as normal. LVEF 62%   • CONVERTED (HISTORICAL) HOLTER   03/04/2015     16 beat run of PAF baseline sinus dago at 50, no symptoms recorded   • CONVERTED (HISTORICAL) HOLTER   07/31/2018     72 hour- baseline sinus- minium 37 bpm and max 96 bpm, 36 pauses noted longest 2.3 sec, 4 beat SVT   • ECHO - CONVERTED   04/16/2014     heart and vascular Dr FLORES EF 65%, mild AR   • OTHER SURGICAL HISTORY   2015     Ecardio- sinus dago, continue observation   • PACEMAKER IMPLANTATION                Family History:        Family History   Problem Relation Age of Onset   • Lung disease Mother     • Cancer Sister     • Cancer Brother           Social History:  Social History               Socioeconomic History   • Marital status:        Spouse name: Not on file   • Number of children: Not on file   • Years of education: Not on file   • Highest education level: Not on file   Social Needs   • Financial resource strain: Not on file   • Food insecurity - worry: Not on file   • Food insecurity - inability: Not on file   • Transportation needs - medical: Not on file   • Transportation needs - non-medical: Not on file   Occupational History   • Not on file   Tobacco Use   • Smoking status: Never Smoker   • Smokeless tobacco: Current User       Types: Chew   • Tobacco comment: patient counseled on effectsof tobacco use on health.    Substance and  Sexual Activity   • Alcohol use: No   • Drug use: No   • Sexual activity: Defer   Other Topics Concern   • Not on file   Social History Narrative   • Not on file         Body mass index is 23.68 kg/m².     Medications:  Current Medications          Current Outpatient Medications   Medication Sig Dispense Refill   • alfuzosin (UROXATRAL) 10 MG 24 hr tablet Take 10 mg by mouth Daily.       • apixaban (ELIQUIS) 5 MG tablet tablet Take 1 tablet by mouth Every 12 (Twelve) Hours. 180 tablet 3   • dicyclomine (BENTYL) 20 MG tablet Take 20 mg by mouth daily.       • ferrous sulfate 325 (65 FE) MG tablet Take 325 mg by mouth Daily With Breakfast.       • lansoprazole (PREVACID) 30 MG capsule Take 30 mg by mouth 2 (two) times a day.       • MEGARED OMEGA-3 KRILL OIL PO Take  by mouth Daily.       • metFORMIN (GLUCOPHAGE) 500 MG tablet Take 500 mg by mouth daily with breakfast.       • pregabalin (LYRICA) 150 MG capsule Take 150 mg by mouth daily.       • rOPINIRole (REQUIP) 0.25 MG tablet Take 0.25 mg by mouth Every Night. Take 1 hour before bedtime.       • sertraline (ZOLOFT) 100 MG tablet Take 100 mg by mouth daily.       • vitamin C (ASCORBIC ACID) 250 MG tablet Take 250 mg by mouth Daily.       • zolpidem (AMBIEN) 10 MG tablet Take 10 mg by mouth At Night As Needed for Sleep.          No current facility-administered medications for this visit.             Allergies:  No Known Allergies     Review of Systems:   Review of Systems   Constitutional: Negative.    HENT: Negative.    Eyes: Negative.    Respiratory: Negative.    Cardiovascular: Negative.    Gastrointestinal: Negative.    Endocrine: Negative.    Genitourinary: Negative.    Musculoskeletal: Positive for arthralgias.   Skin: Negative.    Allergic/Immunologic: Negative.    Neurological: Negative.    Hematological: Negative.    Psychiatric/Behavioral: Negative.          Physical Exam:   Physical Exam   Constitutional: He is oriented to person, place, and time. No  "distress.   HENT:   Head: Normocephalic.   Right Ear: External ear normal.   Left Ear: External ear normal.   Nose: Nose normal.   Eyes: Conjunctivae and EOM are normal. Right eye exhibits no discharge. Left eye exhibits no discharge.   Neck: Normal range of motion. Neck supple.   Cardiovascular: Normal rate, regular rhythm, normal heart sounds and intact distal pulses.   No murmur heard.  Pulmonary/Chest: Effort normal and breath sounds normal. No respiratory distress. He has no wheezes.   Abdominal: Soft. He exhibits no distension. There is no guarding.   Musculoskeletal:     Left shoulder evaluation reveals good strength with internal and external rotation.  He can abduct against resistance at 90 degrees with moderate weakness and moderate pain.  Acromioclavicular joint is moderately tender.  He has moderate pain with crossarm abduction.  He demonstrates moderate wasting of the supraspinatus and infraspinatus muscles.  He demonstrates full range of motion passively and normal neurovascular status.   Neurological: He is alert and oriented to person, place, and time.   Skin: Skin is warm and dry. Capillary refill takes less than 2 seconds. He is not diaphoretic.   Psychiatric: He has a normal mood and affect. His behavior is normal. Judgment and thought content normal.   Nursing note and vitals reviewed.     GENERAL: 73 y.o. male, alert and oriented X 3 in no acute distress.   Visit Vitals  /60   Pulse 58   Ht 177.8 cm (70\")   Wt 74.8 kg (165 lb)   BMI 23.68 kg/m²         Radiology/Labs:   Previous radiographs of right shoulder remarkable for narrowing of the acromioclavicular joint with complete loss of joint space and small inferior osteophyte formation with narrowing of the subacromial space and downsloping acromion.        Assessment & Plan:   73 y.o. male with history of injury right shoulder 6 months ago, unable to undergo MRI of his right shoulder but demonstrating some continuity of rotator cuff " tendon upon exam.  He was offered referral to consider reverse shoulder arthroplasty and he is not interested in that at all.  He wishes to make one attempt at repairing his rotator cuff tendon. He is aware of his cardiac and other health risks and even with this, he wishes to proceed with proposed surgery.  We will begin with arthroscopic evaluation and proceed with open rotator cuff repair if he is felt to have repairable tendon.  He may undergo excision of his lateral clavicle, that will be an intraoperative decision.  Surgery is scheduled [facility], arthroscopy right shoulder with probable open rotator cuff repair acromioplasty and excision of the lateral clavicle.         ICD-10-CM ICD-9-CM   1. Complete tear of right rotator cuff M75.121 727.61   2. Osteoarthritis of right AC (acromioclavicular) joint M19.011 715.91   3. Impingement syndrome of right shoulder M75.41 726.2               Cc:   Jacquelin Turk APRN              Scribed for Tylor Berry MD by Luisa Berry RN.1:41 PM 03/11/2019

## 2019-03-19 NOTE — H&P (VIEW-ONLY)
History and Physical        Patient: Pavel Claudio  YOB: 1945  Date of Encounter: 03/11/2019        Chief Complaint:       Chief Complaint   Patient presents with   • Right Shoulder - Follow-up         HPI:   Pavel Claudio, 73 y.o. male, seen today in follow-up with his right shoulder complaints.  He has had pain in his shoulders for up to 10 years but had an episode in August 2018 when he had immediate pain after an injury and difficulty lifting his arm.  He remained the same over the past 6 months with difficulty lifting his arm and very little improvement.  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  He has no significant neck pain nor does he experience numbness in his arm.  He localizes pain to the lateral aspect of his arm.  He has been unable to undergo MRI of his right shoulder.  He has had some time to consider proposed surgery to his shoulder and presents today wishing to schedule.  His medical history is remarkable for type 2 diabetes mellitus, sick sinus syndrome, bradycardia and left axis deviation.  He remains anticoagulated due to atrial fibrillation.  He takes Eliquis on a daily basis.  He has been evaluated by his cardiologist and has been cleared with relatively low risk.  He had a pacemaker placed November 2018.     Active Problem List:      Patient Active Problem List   Diagnosis   • SSS (sick sinus syndrome) (CMS/HCC)   • Diabetes mellitus type 2, controlled, without complications (CMS/HCC)   • Paroxysmal atrial fibrillation (CMS/HCC)   • Medication management   • Bradycardia   • Chewing tobacco use   • Left axis deviation   • Current use of long term anticoagulation   • Complete tear of right rotator cuff   • Osteoarthritis of right AC (acromioclavicular) joint   • Impingement syndrome of right shoulder         Past Medical History:  Medical History        Past Medical History:   Diagnosis Date   • Anemia     • Anxiety     • BPH (benign  prostatic hyperplasia)     • CAD (coronary artery disease)     • Cheekbone fracture (CMS/HCC)       surgical repair    • Diabetes mellitus (CMS/HCC)     • GERD (gastroesophageal reflux disease)     • H/O foot surgery       left foot   • History of hernia surgery     • PAF (paroxysmal atrial fibrillation) (CMS/HCC)              Past Surgical History:  Surgical History         Past Surgical History:   Procedure Laterality Date   • CARDIOVASCULAR STRESS TEST   04/21/2014     Dr FLORES EF 58%, pos for ischemia    • CARDIOVASCULAR STRESS TEST   06/26/2018     [facility]- nuclear stress test reported as normal. LVEF 62%   • CONVERTED (HISTORICAL) HOLTER   03/04/2015     16 beat run of PAF baseline sinus dago at 50, no symptoms recorded   • CONVERTED (HISTORICAL) HOLTER   07/31/2018     72 hour- baseline sinus- minium 37 bpm and max 96 bpm, 36 pauses noted longest 2.3 sec, 4 beat SVT   • ECHO - CONVERTED   04/16/2014     heart and vascular Dr FLORES EF 65%, mild AR   • OTHER SURGICAL HISTORY   2015     Ecardio- sinus dago, continue observation   • PACEMAKER IMPLANTATION                Family History:        Family History   Problem Relation Age of Onset   • Lung disease Mother     • Cancer Sister     • Cancer Brother           Social History:  Social History               Socioeconomic History   • Marital status:        Spouse name: Not on file   • Number of children: Not on file   • Years of education: Not on file   • Highest education level: Not on file   Social Needs   • Financial resource strain: Not on file   • Food insecurity - worry: Not on file   • Food insecurity - inability: Not on file   • Transportation needs - medical: Not on file   • Transportation needs - non-medical: Not on file   Occupational History   • Not on file   Tobacco Use   • Smoking status: Never Smoker   • Smokeless tobacco: Current User       Types: Chew   • Tobacco comment: patient counseled on effectsof tobacco use on health.    Substance and  Sexual Activity   • Alcohol use: No   • Drug use: No   • Sexual activity: Defer   Other Topics Concern   • Not on file   Social History Narrative   • Not on file         Body mass index is 23.68 kg/m².     Medications:  Current Medications          Current Outpatient Medications   Medication Sig Dispense Refill   • alfuzosin (UROXATRAL) 10 MG 24 hr tablet Take 10 mg by mouth Daily.       • apixaban (ELIQUIS) 5 MG tablet tablet Take 1 tablet by mouth Every 12 (Twelve) Hours. 180 tablet 3   • dicyclomine (BENTYL) 20 MG tablet Take 20 mg by mouth daily.       • ferrous sulfate 325 (65 FE) MG tablet Take 325 mg by mouth Daily With Breakfast.       • lansoprazole (PREVACID) 30 MG capsule Take 30 mg by mouth 2 (two) times a day.       • MEGARED OMEGA-3 KRILL OIL PO Take  by mouth Daily.       • metFORMIN (GLUCOPHAGE) 500 MG tablet Take 500 mg by mouth daily with breakfast.       • pregabalin (LYRICA) 150 MG capsule Take 150 mg by mouth daily.       • rOPINIRole (REQUIP) 0.25 MG tablet Take 0.25 mg by mouth Every Night. Take 1 hour before bedtime.       • sertraline (ZOLOFT) 100 MG tablet Take 100 mg by mouth daily.       • vitamin C (ASCORBIC ACID) 250 MG tablet Take 250 mg by mouth Daily.       • zolpidem (AMBIEN) 10 MG tablet Take 10 mg by mouth At Night As Needed for Sleep.          No current facility-administered medications for this visit.             Allergies:  No Known Allergies     Review of Systems:   Review of Systems   Constitutional: Negative.    HENT: Negative.    Eyes: Negative.    Respiratory: Negative.    Cardiovascular: Negative.    Gastrointestinal: Negative.    Endocrine: Negative.    Genitourinary: Negative.    Musculoskeletal: Positive for arthralgias.   Skin: Negative.    Allergic/Immunologic: Negative.    Neurological: Negative.    Hematological: Negative.    Psychiatric/Behavioral: Negative.          Physical Exam:   Physical Exam   Constitutional: He is oriented to person, place, and time. No  "distress.   HENT:   Head: Normocephalic.   Right Ear: External ear normal.   Left Ear: External ear normal.   Nose: Nose normal.   Eyes: Conjunctivae and EOM are normal. Right eye exhibits no discharge. Left eye exhibits no discharge.   Neck: Normal range of motion. Neck supple.   Cardiovascular: Normal rate, regular rhythm, normal heart sounds and intact distal pulses.   No murmur heard.  Pulmonary/Chest: Effort normal and breath sounds normal. No respiratory distress. He has no wheezes.   Abdominal: Soft. He exhibits no distension. There is no guarding.   Musculoskeletal:     Left shoulder evaluation reveals good strength with internal and external rotation.  He can abduct against resistance at 90 degrees with moderate weakness and moderate pain.  Acromioclavicular joint is moderately tender.  He has moderate pain with crossarm abduction.  He demonstrates moderate wasting of the supraspinatus and infraspinatus muscles.  He demonstrates full range of motion passively and normal neurovascular status.   Neurological: He is alert and oriented to person, place, and time.   Skin: Skin is warm and dry. Capillary refill takes less than 2 seconds. He is not diaphoretic.   Psychiatric: He has a normal mood and affect. His behavior is normal. Judgment and thought content normal.   Nursing note and vitals reviewed.     GENERAL: 73 y.o. male, alert and oriented X 3 in no acute distress.   Visit Vitals  /60   Pulse 58   Ht 177.8 cm (70\")   Wt 74.8 kg (165 lb)   BMI 23.68 kg/m²         Radiology/Labs:   Previous radiographs of right shoulder remarkable for narrowing of the acromioclavicular joint with complete loss of joint space and small inferior osteophyte formation with narrowing of the subacromial space and downsloping acromion.        Assessment & Plan:   73 y.o. male with history of injury right shoulder 6 months ago, unable to undergo MRI of his right shoulder but demonstrating some continuity of rotator cuff " tendon upon exam.  He was offered referral to consider reverse shoulder arthroplasty and he is not interested in that at all.  He wishes to make one attempt at repairing his rotator cuff tendon. He is aware of his cardiac and other health risks and even with this, he wishes to proceed with proposed surgery.  We will begin with arthroscopic evaluation and proceed with open rotator cuff repair if he is felt to have repairable tendon.  He may undergo excision of his lateral clavicle, that will be an intraoperative decision.  Surgery is scheduled [facility], arthroscopy right shoulder with probable open rotator cuff repair acromioplasty and excision of the lateral clavicle.         ICD-10-CM ICD-9-CM   1. Complete tear of right rotator cuff M75.121 727.61   2. Osteoarthritis of right AC (acromioclavicular) joint M19.011 715.91   3. Impingement syndrome of right shoulder M75.41 726.2               Cc:   Jacquelin Turk APRN              Scribed for Tylor Berry MD by Luisa Berry RN.1:41 PM 03/11/2019

## 2019-03-19 NOTE — OP NOTE
SHOULDER ARTHROSCOPY WITH MINI OPEN ROTATOR CUFF REPAIR  Procedure Note    Pavel Claudio  3/19/2019    Pre-op Diagnosis:   Complete tear of right rotator cuff [M75.121]  Osteoarthritis of right AC (acromioclavicular) joint [M19.011]  Impingement syndrome of right shoulder [M75.41]    Post-op Diagnosis:     Post-Op Diagnosis Codes:     * Complete tear of right rotator cuff [M75.121]     * Osteoarthritis of right AC (acromioclavicular) joint [M19.011]     * Impingement syndrome of right shoulder [M75.41]    Procedure(s):  SHOULDER ARTHROSCOPY WITH  OPEN ROTATOR CUFF REPAIR, ACOMIOPLASTY    Surgeon(s):  Tylor Berry MD    Anesthesia: General/interscalene block    Operative technique: With patient in the operating theatre under general anesthetic with an interscalene block administered in the preoperative area he was positioned beachchair with pressure points padded.  Head was supported blood pressure closely monitored.  The right shoulder and arm were sterilely prepped and draped in the usual manner.  Arthroscope was introduced to the posterior viewing portal.  There was complete rotator cuff tear retracted anteriorly and medially.  Biceps tendon was intact biceps anchor intact articular cartilage intact.  Arthroscope was removed and passed through the lateral portal.  Tendon was again visualized.  Decision was made to proceed with attempt at open rotator cuff repair.  Oblique incision was made off the anterolateral corner of the acromion was carried through skin sharply.  Deltoid was reflected off the anterior and anterolateral portion of the acromion and split in the interval between the anterior middle thirds.  Arm was distracted rotator cuff tendon inspected biceps tendon was intact moderate hyperemia proximal to bicipital groove.  The rotator cuff tendon was grasped with Allis forceps and drawn primarily posteriorly.  The supraspinatus portion of the rotator cuff tendon appeared to have retracted  anteriorly and medially.  There was a portion of the first spinatus tendon which could be identified rest with Allis forceps and freed with a Fraser elevator.  Tag sutures were placed through this posteriorly and it was drawn further laterally and anteriorly.  Then with #2 Ethibond side-to-side repair was carried out repairing the supraspinatus to the infraspinatus.  With 2 figure-of-eight sutures position and left untied additional suture #2 Ethibond was placed through the lateral and posterior edge of the supraspinatus tendon.  Then with #5 Ethibond passed to the lateral cortex of the greater tuberosity there was a trough created in the greater tuberosity removing a small portion of articular cartilage medializing the insertion.  #5 Ethibond was exited through this trough created passed through the rotator cuff tendon and a new fashion passed back through with of about 2 cm and then passed through the trough exiting the lateral cortex.  With the arm abducted this was tied pulling the rotator cuff tendon supraspinatus into the cancellus bed created.  Care was taken to avoid passing suture through the biceps tendon.  The regular rate #2 Ethibond sutures previously placed were then tied.  Additional figure-of-eight was placed securing supraspinatus to infraspinatus leaving a single limb of #2 Ethibond x4 and this was brought laterally reducing tension on the repair and secured with Arthrex swivel lock distally.  2 additional stay sutures were  Position.  Using #2 Ethibond and once tied these were secured with a second swivel lock slightly anterior to the first.  There was complete coverage of the humeral head.  The arm was cycled and 90 degrees of flexion be achieved with the tendon intact.  With palpation inferiorly the lateral clavicle did not apply significant pressure and decision was made not to excise the lateral clavicle.  Wound was copiously irrigated the deltoid repaired back to bone with #2 Ethibond in a  figure-of-eight x3.  Split the deltoid repaired with 2-0 Monocryl wound closed in layers with staples for final closure 2 arthroscopy portals were closed and stapled.  Sterile dressing was applied sling and swath applied he was taken to recovery room in stable condition.    Staff:   Circulator: Adam Loyd RN  Scrub Person: Amy Perez  Assistant: Eliud Cee    Estimated Blood Loss: 50 cc    Specimens:   none             * No orders in the log *    Implants/Grafts: none      Drains: None      Complications: none    Tourniquet time:   None  Tylor Berry MD     Date: 3/19/2019  Time: 2:32 PM    Cc: Jacquelin Turk APRN

## 2019-03-19 NOTE — PLAN OF CARE
Problem: Patient Care Overview  Goal: Plan of Care Review  Outcome: Ongoing (interventions implemented as appropriate)   03/19/19 1546   Coping/Psychosocial   Plan of Care Reviewed With patient;family   Plan of Care Review   Progress improving       Problem: Fall Risk (Adult)  Goal: Identify Related Risk Factors and Signs and Symptoms  Outcome: Ongoing (interventions implemented as appropriate)   03/19/19 1546   Fall Risk (Adult)   Related Risk Factors (Fall Risk) environment unfamiliar;polypharmacy   Signs and Symptoms (Fall Risk) presence of risk factors     Goal: Absence of Fall  Outcome: Ongoing (interventions implemented as appropriate)   03/19/19 1546   Fall Risk (Adult)   Absence of Fall making progress toward outcome       Problem: Pain, Acute (Adult)  Goal: Identify Related Risk Factors and Signs and Symptoms  Outcome: Ongoing (interventions implemented as appropriate)   03/19/19 1546   Pain, Acute (Adult)   Related Risk Factors (Acute Pain) surgery   Signs and Symptoms (Acute Pain) verbalization of pain descriptors     Goal: Acceptable Pain Control/Comfort Level  Outcome: Ongoing (interventions implemented as appropriate)   03/19/19 1546   Pain, Acute (Adult)   Acceptable Pain Control/Comfort Level making progress toward outcome       Problem: Skin Injury Risk (Adult)  Goal: Identify Related Risk Factors and Signs and Symptoms  Outcome: Ongoing (interventions implemented as appropriate)   03/19/19 1546   Skin Injury Risk (Adult)   Related Risk Factors (Skin Injury Risk) mobility impaired     Goal: Skin Health and Integrity  Outcome: Ongoing (interventions implemented as appropriate)   03/19/19 1546   Skin Injury Risk (Adult)   Skin Health and Integrity making progress toward outcome

## 2019-03-19 NOTE — ANESTHESIA PROCEDURE NOTES
Peripheral Block    Pre-sedation assessment completed: 3/19/2019 9:40 AM    Patient location during procedure: holding area  Start time: 3/19/2019 9:40 AM  Stop time: 3/19/2019 9:44 AM  Reason for block: procedure for pain, at surgeon's request and post-op pain management  Performed by  Anesthesiologist: Yovanny Booker MD  Preanesthetic Checklist  Completed: patient identified, site marked, surgical consent, pre-op evaluation, timeout performed, IV checked, risks and benefits discussed and monitors and equipment checked  Prep:  Pt Position: supine  Sterile barriers:gloves and mask  Prep: ChloraPrep and alcohol swabs  Patient monitoring: blood pressure monitoring, continuous pulse oximetry and EKG  Procedure  Sedation:yes  Performed under: MAC  Guidance:nerve stimulator and landmark technique  Images:still images not obtained    Laterality:right  Block Type:interscalene and supraclavicular  Injection Technique:single-shot  Needle Type:short-bevel  Needle Gauge:22 G  Resistance on Injection: less than 15 psi  Catheter Size:18 G  Medications Used: ropivacaine (NAROPIN) injection 0.5 %, 20 mg  Post Assessment  Injection Assessment: negative aspiration for heme, no paresthesia on injection and incremental injection  Patient Tolerance:comfortable throughout block  Complications:no

## 2019-03-19 NOTE — ANESTHESIA POSTPROCEDURE EVALUATION
Patient: Pavle Claudio    Procedure Summary     Date:  03/19/19 Room / Location:  [facility] OR 03 /  [facility]    Anesthesia Start:  1207 Anesthesia Stop:  1439    Procedure:  SHOULDER ARTHROSCOPY WITH  OPEN ROTATOR CUFF REPAIR, ACOMIOPLASTY (Right Shoulder) Diagnosis:       Complete tear of right rotator cuff      Osteoarthritis of right AC (acromioclavicular) joint      Impingement syndrome of right shoulder      (Complete tear of right rotator cuff [M75.121])      (Osteoarthritis of right AC (acromioclavicular) joint [M19.011])      (Impingement syndrome of right shoulder [M75.41])    Surgeon:  Tylor Berry MD Provider:  Yovanny Booker MD    Anesthesia Type:  general ASA Status:  3          Anesthesia Type: general  Last vitals  BP   145/72 (03/19/19 1512)   Temp   97 °F (36.1 °C) (03/19/19 1512)   Pulse   51 (03/19/19 1512)   Resp   16 (03/19/19 1512)     SpO2   99 % (03/19/19 1512)     Post Anesthesia Care and Evaluation    Patient location during evaluation: PHASE II  Patient participation: complete - patient participated  Level of consciousness: awake and alert  Pain score: 1  Pain management: adequate  Airway patency: patent  Anesthetic complications: No anesthetic complications  PONV Status: controlled  Cardiovascular status: acceptable  Respiratory status: acceptable  Hydration status: acceptable

## 2019-03-20 VITALS
DIASTOLIC BLOOD PRESSURE: 67 MMHG | HEART RATE: 55 BPM | WEIGHT: 165 LBS | BODY MASS INDEX: 23.62 KG/M2 | HEIGHT: 70 IN | RESPIRATION RATE: 16 BRPM | SYSTOLIC BLOOD PRESSURE: 148 MMHG | TEMPERATURE: 98.2 F | OXYGEN SATURATION: 92 %

## 2019-03-20 LAB — GLUCOSE BLDC GLUCOMTR-MCNC: 165 MG/DL (ref 70–130)

## 2019-03-20 PROCEDURE — G0378 HOSPITAL OBSERVATION PER HR: HCPCS

## 2019-03-20 PROCEDURE — A9270 NON-COVERED ITEM OR SERVICE: HCPCS | Performed by: ORTHOPAEDIC SURGERY

## 2019-03-20 PROCEDURE — 94799 UNLISTED PULMONARY SVC/PX: CPT

## 2019-03-20 PROCEDURE — 25010000002 ONDANSETRON PER 1 MG

## 2019-03-20 PROCEDURE — 63710000001 FERROUS SULFATE 325 (65 FE) MG TABLET: Performed by: ORTHOPAEDIC SURGERY

## 2019-03-20 PROCEDURE — 82962 GLUCOSE BLOOD TEST: CPT

## 2019-03-20 PROCEDURE — 25010000003 CEFAZOLIN PER 500 MG: Performed by: ORTHOPAEDIC SURGERY

## 2019-03-20 PROCEDURE — 63710000001 SERTRALINE 50 MG TABLET: Performed by: ORTHOPAEDIC SURGERY

## 2019-03-20 PROCEDURE — 63710000001 VITAMIN C 500 MG TABLET: Performed by: ORTHOPAEDIC SURGERY

## 2019-03-20 PROCEDURE — 99024 POSTOP FOLLOW-UP VISIT: CPT | Performed by: PHYSICIAN ASSISTANT

## 2019-03-20 PROCEDURE — 63710000001 OXYCODONE-ACETAMINOPHEN 7.5-325 MG TABLET: Performed by: ORTHOPAEDIC SURGERY

## 2019-03-20 PROCEDURE — 63710000001 PANTOPRAZOLE 40 MG TABLET DELAYED-RELEASE: Performed by: ORTHOPAEDIC SURGERY

## 2019-03-20 PROCEDURE — 63710000001 APIXABAN 5 MG TABLET: Performed by: ORTHOPAEDIC SURGERY

## 2019-03-20 PROCEDURE — 25010000002 HYDROMORPHONE 1 MG/ML SOLUTION: Performed by: ORTHOPAEDIC SURGERY

## 2019-03-20 PROCEDURE — 63710000001 METFORMIN 500 MG TABLET: Performed by: ORTHOPAEDIC SURGERY

## 2019-03-20 PROCEDURE — 25010000002 HYDROMORPHONE PER 4 MG: Performed by: ORTHOPAEDIC SURGERY

## 2019-03-20 PROCEDURE — 63710000001 DICYCLOMINE PER 20 MG: Performed by: ORTHOPAEDIC SURGERY

## 2019-03-20 RX ORDER — ONDANSETRON 2 MG/ML
INJECTION INTRAMUSCULAR; INTRAVENOUS
Status: COMPLETED
Start: 2019-03-20 | End: 2019-03-20

## 2019-03-20 RX ORDER — ONDANSETRON 2 MG/ML
4 INJECTION INTRAMUSCULAR; INTRAVENOUS EVERY 6 HOURS PRN
Status: DISCONTINUED | OUTPATIENT
Start: 2019-03-20 | End: 2019-03-20 | Stop reason: HOSPADM

## 2019-03-20 RX ORDER — OXYCODONE HYDROCHLORIDE AND ACETAMINOPHEN 5; 325 MG/1; MG/1
1 TABLET ORAL EVERY 4 HOURS PRN
Qty: 40 TABLET | Refills: 0 | Status: SHIPPED | OUTPATIENT
Start: 2019-03-20 | End: 2019-04-29

## 2019-03-20 RX ADMIN — FERROUS SULFATE TAB 325 MG (65 MG ELEMENTAL FE) 325 MG: 325 (65 FE) TAB at 08:33

## 2019-03-20 RX ADMIN — PANTOPRAZOLE SODIUM 40 MG: 40 TABLET, DELAYED RELEASE ORAL at 05:16

## 2019-03-20 RX ADMIN — METFORMIN HYDROCHLORIDE 500 MG: 500 TABLET ORAL at 08:33

## 2019-03-20 RX ADMIN — HYDROMORPHONE HYDROCHLORIDE 0.5 MG: 1 INJECTION, SOLUTION INTRAMUSCULAR; INTRAVENOUS; SUBCUTANEOUS at 00:58

## 2019-03-20 RX ADMIN — HYDROMORPHONE HYDROCHLORIDE 0.5 MG: 1 INJECTION, SOLUTION INTRAMUSCULAR; INTRAVENOUS; SUBCUTANEOUS at 05:16

## 2019-03-20 RX ADMIN — APIXABAN 5 MG: 5 TABLET, FILM COATED ORAL at 08:33

## 2019-03-20 RX ADMIN — DICYCLOMINE HYDROCHLORIDE 20 MG: 20 TABLET ORAL at 08:33

## 2019-03-20 RX ADMIN — HYDROMORPHONE HYDROCHLORIDE 0.5 MG: 1 INJECTION, SOLUTION INTRAMUSCULAR; INTRAVENOUS; SUBCUTANEOUS at 03:30

## 2019-03-20 RX ADMIN — OXYCODONE HYDROCHLORIDE AND ACETAMINOPHEN 2 TABLET: 7.5; 325 TABLET ORAL at 02:05

## 2019-03-20 RX ADMIN — ONDANSETRON 4 MG: 2 INJECTION INTRAMUSCULAR; INTRAVENOUS at 02:05

## 2019-03-20 RX ADMIN — SERTRALINE 100 MG: 50 TABLET, FILM COATED ORAL at 08:33

## 2019-03-20 RX ADMIN — HYDROMORPHONE HYDROCHLORIDE 0.5 MG: 1 INJECTION, SOLUTION INTRAMUSCULAR; INTRAVENOUS; SUBCUTANEOUS at 07:18

## 2019-03-20 RX ADMIN — OXYCODONE HYDROCHLORIDE AND ACETAMINOPHEN 2 TABLET: 7.5; 325 TABLET ORAL at 13:10

## 2019-03-20 RX ADMIN — OXYCODONE HYDROCHLORIDE AND ACETAMINOPHEN 250 MG: 500 TABLET ORAL at 08:33

## 2019-03-20 RX ADMIN — CEFAZOLIN 2 G: 1 INJECTION, POWDER, FOR SOLUTION INTRAMUSCULAR; INTRAVENOUS; PARENTERAL at 04:09

## 2019-03-20 RX ADMIN — HYDROMORPHONE HYDROCHLORIDE 0.5 MG: 1 INJECTION, SOLUTION INTRAMUSCULAR; INTRAVENOUS; SUBCUTANEOUS at 10:15

## 2019-03-20 NOTE — PLAN OF CARE
Problem: Patient Care Overview  Goal: Plan of Care Review  Outcome: Ongoing (interventions implemented as appropriate)   03/19/19 1546 03/19/19 2100   Coping/Psychosocial   Plan of Care Reviewed With --  patient;family   Plan of Care Review   Progress improving --      Goal: Individualization and Mutuality  Outcome: Ongoing (interventions implemented as appropriate)    Goal: Discharge Needs Assessment  Outcome: Ongoing (interventions implemented as appropriate)    Goal: Interprofessional Rounds/Family Conf  Outcome: Ongoing (interventions implemented as appropriate)      Problem: Fall Risk (Adult)  Goal: Identify Related Risk Factors and Signs and Symptoms  Outcome: Ongoing (interventions implemented as appropriate)   03/19/19 1546   Fall Risk (Adult)   Related Risk Factors (Fall Risk) environment unfamiliar;polypharmacy   Signs and Symptoms (Fall Risk) presence of risk factors     Goal: Absence of Fall  Outcome: Ongoing (interventions implemented as appropriate)      Problem: Pain, Acute (Adult)  Goal: Identify Related Risk Factors and Signs and Symptoms  Outcome: Ongoing (interventions implemented as appropriate)   03/19/19 1546   Pain, Acute (Adult)   Related Risk Factors (Acute Pain) surgery   Signs and Symptoms (Acute Pain) verbalization of pain descriptors     Goal: Acceptable Pain Control/Comfort Level  Outcome: Ongoing (interventions implemented as appropriate)      Problem: Skin Injury Risk (Adult)  Goal: Identify Related Risk Factors and Signs and Symptoms  Outcome: Ongoing (interventions implemented as appropriate)   03/19/19 1546   Skin Injury Risk (Adult)   Related Risk Factors (Skin Injury Risk) mobility impaired     Goal: Skin Health and Integrity  Outcome: Ongoing (interventions implemented as appropriate)

## 2019-03-20 NOTE — PROGRESS NOTES
Inpatient Progress Note  Pavel Claudio  Date: 03/20/19  MRN: [id number]      Subjective:  Pavel Claudio is a 73 y.o. male POD# 1 right shoulder arthroscopy with open rotator cuff repair and acromioplasty.  He sitting up in bedside this morning eating breakfast.  He is complaining of moderate pain but states it is manageable with pain medication.  His sling and swath are in place.  He denies paresthesias.  He has no new complaints this morning.    Objective:    Vitals:    03/19/19 1933 03/19/19 2300 03/20/19 0300 03/20/19 0700   BP:  142/61 136/55 151/67   BP Location:  Left arm Left arm Left arm   Patient Position:  Lying Lying Lying   Pulse:  61 60 57   Resp:  22 22 16   Temp:  98.5 °F (36.9 °C) 97.6 °F (36.4 °C) 98.2 °F (36.8 °C)   TempSrc:  Oral Oral Oral   SpO2: 94% 91% 92% 90%   Weight:       Height:         He is alert and oriented x3.  Examination of the right shoulder reveals Aquacel dressing intact.  Sling and swath are in place.  He has moderate swelling.  He has good mobility of the wrist and hand.  His neurovascular status is intact.    Labs:    Results from last 7 days   Lab Units 03/15/19  1451   WBC 10*3/mm3 6.51   HEMOGLOBIN g/dL 13.1   HEMATOCRIT % 40.8   MCV fL 87.7   MCHC g/dL 32.1   PLATELETS 10*3/mm3 228         Results from last 7 days   Lab Units 03/15/19  1451   SODIUM mmol/L 138   POTASSIUM mmol/L 3.8   CHLORIDE mmol/L 106   CO2 mmol/L 21.5*   BUN mg/dL 14   CREATININE mg/dL 0.90   EGFR IF NONAFRICN AM mL/min/1.73 83   CALCIUM mg/dL 8.9   GLUCOSE mg/dL 123*   Estimated Creatinine Clearance: 77.3 mL/min (by C-G formula based on SCr of 0.9 mg/dL).  No results found for: AMMONIA          No results found for: HGBA1C  Lab Results   Component Value Date    TSH 1.490 09/21/2016         Pain Management Panel     There is no flowsheet data to display.              Radiology:  Imaging Results (last 72 hours)     Procedure Component Value Units Date/Time    [place] [facility] (surgery) [[id number]]  Resulted:  03/19/19 1146     Updated:  03/19/19 1146    Narrative:       This procedure was auto-finalized with no dictation required.            Assessment:      ICD-10-CM ICD-9-CM   1. Complete tear of right rotator cuff M75.121 727.61   2. Osteoarthritis of right AC (acromioclavicular) joint M19.011 715.91   3. Impingement syndrome of right shoulder M75.41 726.2       Plan:  POD#1 right shoulder arthroscopy with open rotator cuff repair and acromioplasty.  He is doing well.  Pain is controlled.  Advised to continue with the sling and swath.  He can remove this for gentle range of motion exercises at the elbow, wrist, and hand.  He is to otherwise work full-time.  Will discharge home today and return to clinic in 1 week      MCKENNA Roberts

## 2019-03-20 NOTE — PHARMACY PATIENT ASSISTANCE
Pharmacy checked on the cost of patient's home med Eliquis. Per patient's spouse, [facility].     Thank you,  Linda Blair RP

## 2019-03-20 NOTE — DISCHARGE SUMMARY
Pavel Claudio  1945  [id number]      Date of Discharge:  3/20/2019      Discharge Diagnosis:   Complete tear of right rotator cuff  Osteoarthritis of right acromial clavicular joint  Impingement syndrome right shoulder    Procedures Performed  Procedure(s):  SHOULDER ARTHROSCOPY WITH  OPEN ROTATOR CUFF REPAIR, ACOMIOPLASTY         Hospital Course  Patient is a 73 y.o. male presented with chronic right shoulder pain with an episode back in 2018 when he had immediate pain after an injury and difficulty lifting his arm.  He is plateaued over the last 6 months and continued to have difficulty lifting his arm with very little improvement.  He is unable to undergo MRI but demonstrated continue evidence of rotator cuff tear on exam.  Given this he was brought to the operative room on 3/19/2019 and underwent right shoulder arthroscopy with open rotator cuff repair and acromioplasty.  Intraoperatively was fairly uneventful.  Postoperatively he was admitted for pain control.  On postoperative day 1 he was sitting up in bedside eating breakfast.  He complained of moderate pain but states it was manageable with pain medication.  He was tolerating the sling and swath well.  He denied paresthesias.  His vital signs are stable he was afebrile and white blood count and hemoglobin within normal limits.  The remainder of his stay was uneventful and he was deemed medically stable and discharged home on 3/20/2019.  He was instructed to continue wearing the sling and swath but he could remove it for gentle range of motion of the elbow, wrist, and hand.  He will return back to the clinic in 1 week for follow-up      Consults:   Consults     No orders found for last 30 day(s).            Condition on Discharge:  Stable      Vital Signs  Vitals:    03/19/19 2300 03/20/19 0300 03/20/19 0700 03/20/19 1003   BP: 142/61 136/55 151/67    BP Location: Left arm Left arm Left arm    Patient Position: Lying Lying Lying    Pulse: 61 60 57     Resp: 22 22 16    Temp: 98.5 °F (36.9 °C) 97.6 °F (36.4 °C) 98.2 °F (36.8 °C)    TempSrc: Oral Oral Oral    SpO2: 91% 92% 90% 92%   Weight:       Height:             Discharge Disposition  Home or Self Care      Discharge Medications     Discharge Medications      New Medications      Instructions Start Date   oxyCODONE-acetaminophen 5-325 MG per tablet  Commonly known as:  PERCOCET   1 tablet, Oral, Every 4 Hours PRN         Continue These Medications      Instructions Start Date   alfuzosin 10 MG 24 hr tablet  Commonly known as:  UROXATRAL   10 mg, Oral, Daily      apixaban 5 MG tablet tablet  Commonly known as:  ELIQUIS   5 mg, Oral, Every 12 Hours Scheduled      dicyclomine 20 MG tablet  Commonly known as:  BENTYL   20 mg, Oral, Daily      ferrous sulfate 325 (65 FE) MG tablet   325 mg, Oral, Daily With Breakfast      lansoprazole 30 MG capsule  Commonly known as:  PREVACID   30 mg, Oral, 2 Times Daily      MEGARED OMEGA-3 KRILL OIL PO   1 capsule, Oral, Daily      metFORMIN 500 MG tablet  Commonly known as:  GLUCOPHAGE   500 mg, Oral, Daily With Breakfast      pregabalin 150 MG capsule  Commonly known as:  LYRICA   150 mg, Oral, 2 Times Daily PRN      rOPINIRole 0.25 MG tablet  Commonly known as:  REQUIP   0.25 mg, Oral, Nightly, Take 1 hour before bedtime.      sertraline 100 MG tablet  Commonly known as:  ZOLOFT   100 mg, Oral, Daily      vitamin C 250 MG tablet  Commonly known as:  ASCORBIC ACID   250 mg, Oral, Daily      zolpidem 5 MG tablet  Commonly known as:  AMBIEN   5 mg, Oral, Nightly PRN               Follow-up Appointments  No future appointments.  Additional Instructions for the Follow-ups that You Need to Schedule     Discharge Follow-up with Specified Provider: Dr. Berry; 1 Week   As directed      To:  Dr. Berry    Follow Up:  1 Week

## 2019-03-20 NOTE — DISCHARGE INSTR - APPOINTMENTS
You have a scheduled follow-up appointmnet with Jacquelin Adrian on 3/28/19 at 11:00. Office number [phone]

## 2019-03-27 ENCOUNTER — OFFICE VISIT (OUTPATIENT)
Dept: ORTHOPEDIC SURGERY | Facility: CLINIC | Age: 74
End: 2019-03-27

## 2019-03-27 VITALS — HEIGHT: 70 IN | WEIGHT: 164.9 LBS | BODY MASS INDEX: 23.61 KG/M2

## 2019-03-27 DIAGNOSIS — Z98.890 STATUS POST RIGHT ROTATOR CUFF REPAIR: Primary | ICD-10-CM

## 2019-03-27 PROCEDURE — 99024 POSTOP FOLLOW-UP VISIT: CPT | Performed by: ORTHOPAEDIC SURGERY

## 2019-03-27 NOTE — PROGRESS NOTES
Follow-up Visit         Patient: Pavel Claudio  YOB: 1945  Date of Encounter: 03/27/2019      Chief  Complaint:   Chief Complaint   Patient presents with   • Right Shoulder - Post-op, Follow-up     03/19/2019 SHOULDER ARTHROSCOPY WITH  OPEN ROTATOR CUFF REPAIR, ACOMIOPLASTY         HPI:  Pavel Claudio, 73 y.o. male returns in follow-up arthroscopy right shoulder rotator cuff repair and acromioplasty.  He had extremely large tear however coverage of the humeral head was achieved.  He reports his pain is better than it was preoperatively.    Medical History:  Patient Active Problem List   Diagnosis   • SSS (sick sinus syndrome) (CMS/[facility])   • Diabetes mellitus type 2, controlled, without complications (CMS/[facility])   • Paroxysmal atrial fibrillation (CMS/[facility])   • Medication management   • Bradycardia   • Chewing tobacco use   • Left axis deviation   • Current use of long term anticoagulation   • Complete tear of right rotator cuff   • Osteoarthritis of right AC (acromioclavicular) joint   • Impingement syndrome of right shoulder     Past Medical History:   Diagnosis Date   • Anemia    • Anxiety    • Arthritis    • BPH (benign prostatic hyperplasia)    • CAD (coronary artery disease)    • Cheekbone fracture (CMS/[facility])     surgical repair    • Diabetes mellitus (CMS/[facility])    • GERD (gastroesophageal reflux disease)    • H/O foot surgery     left foot   • History of hernia surgery    • Pacemaker    • PAF (paroxysmal atrial fibrillation) (CMS/[facility])        Social History:  Social History     Socioeconomic History   • Marital status:      Spouse name: Not on file   • Number of children: Not on file   • Years of education: Not on file   • Highest education level: Not on file   Tobacco Use   • Smoking status: Never Smoker   • Smokeless tobacco: Current User     Types: Chew   • Tobacco comment: patient counseled on effectsof tobacco use on health.    Substance and Sexual Activity   • Alcohol use: No   • Drug  use: No   • Sexual activity: Defer     Partners: Female     Birth control/protection: None       Surgical History:  Past Surgical History:   Procedure Laterality Date   • APPENDECTOMY     • CARDIAC CATHETERIZATION     • CARDIAC SURGERY     • CARDIOVASCULAR STRESS TEST  04/21/2014    Dr SANDRA GÓMEZ 58%, pos for ischemia    • CARDIOVASCULAR STRESS TEST  06/26/2018    [facility]- nuclear stress test reported as normal. LVEF 62%   • COLONOSCOPY     • CONVERTED (HISTORICAL) HOLTER  03/04/2015    16 beat run of PAF baseline sinus dago at 50, no symptoms recorded   • CONVERTED (HISTORICAL) HOLTER  07/31/2018    72 hour- baseline sinus- minium 37 bpm and max 96 bpm, 36 pauses noted longest 2.3 sec, 4 beat SVT   • ECHO - CONVERTED  04/16/2014    heart and vascular Dr SANDRA GÓMEZ 65%, mild AR   • ENDOSCOPY     • FOOT SURGERY     • HERNIA REPAIR     • OTHER SURGICAL HISTORY  2015    Ecardio- sinus dago, continue observation   • PACEMAKER IMPLANTATION     • SHOULDER ARTHROSCOPY W/ ROTATOR CUFF REPAIR Right 3/19/2019    Procedure: SHOULDER ARTHROSCOPY WITH  OPEN ROTATOR CUFF REPAIR, ACOMIOPLASTY;  Surgeon: Tylor Berry MD;  Location: [facility];  Service: Orthopedics       Examination:   Right shoulder evaluation reveals intact incision staples in place.    Assessment & Plan:   73 y.o. male status post open right shoulder rotator cuff repair acromioplasty.  Due to the severity of his tendon tear we will hold off on physical therapy for the next 4 weeks.  He will do gentle pendulum exercises he is provided a different sling today.  He will follow-up in 4 weeks.         Diagnosis Plan   1. Status post right rotator cuff repair               Cc:  Jacquelin Turk APRN

## 2019-04-29 ENCOUNTER — OFFICE VISIT (OUTPATIENT)
Dept: ORTHOPEDIC SURGERY | Facility: CLINIC | Age: 74
End: 2019-04-29

## 2019-04-29 VITALS — HEIGHT: 70 IN | BODY MASS INDEX: 23.61 KG/M2 | WEIGHT: 164.9 LBS

## 2019-04-29 DIAGNOSIS — Z09 POSTOP CHECK: ICD-10-CM

## 2019-04-29 DIAGNOSIS — M75.121 COMPLETE TEAR OF RIGHT ROTATOR CUFF: Primary | ICD-10-CM

## 2019-04-29 PROCEDURE — 99024 POSTOP FOLLOW-UP VISIT: CPT | Performed by: ORTHOPAEDIC SURGERY

## 2019-04-29 NOTE — PROGRESS NOTES
Follow-up Visit         Patient: Pavel Claudio  YOB: 1945  Date of Encounter: 04/29/2019      Chief  Complaint:   Chief Complaint   Patient presents with   • Right Shoulder - Post-op, Follow-up     03/19/19 (41d) Tylor Berry MD      SHOULDER ARTHROSCOPY WITH  OPEN ROTATOR CUFF REPAIR, ACOMIOPLASTY - Right             HPI:  Pavel Claudio, 73 y.o. male turns in follow-up 6 weeks following shoulder arthroscopy and repair of massive rotator cuff tear.  He is significantly better from a pain standpoint.  He has not attended physical therapy at my request.  Sleeping much better and reports much less rest pain.    Medical History:  Patient Active Problem List   Diagnosis   • SSS (sick sinus syndrome) (CMS/[facility])   • Diabetes mellitus type 2, controlled, without complications (CMS/[facility])   • Paroxysmal atrial fibrillation (CMS/[facility])   • Medication management   • Bradycardia   • Chewing tobacco use   • Left axis deviation   • Current use of long term anticoagulation   • Complete tear of right rotator cuff   • Osteoarthritis of right AC (acromioclavicular) joint   • Impingement syndrome of right shoulder     Past Medical History:   Diagnosis Date   • Anemia    • Anxiety    • Arthritis    • BPH (benign prostatic hyperplasia)    • CAD (coronary artery disease)    • Cheekbone fracture (CMS/[facility])     surgical repair    • Diabetes mellitus (CMS/[facility])    • GERD (gastroesophageal reflux disease)    • H/O foot surgery     left foot   • History of hernia surgery    • Pacemaker    • PAF (paroxysmal atrial fibrillation) (CMS/[facility])        Social History:  Social History     Socioeconomic History   • Marital status:      Spouse name: Not on file   • Number of children: Not on file   • Years of education: Not on file   • Highest education level: Not on file   Tobacco Use   • Smoking status: Never Smoker   • Smokeless tobacco: Current User     Types: Chew   • Tobacco comment: patient counseled on effectsof  tobacco use on health.    Substance and Sexual Activity   • Alcohol use: No   • Drug use: No   • Sexual activity: Defer     Partners: Female     Birth control/protection: None       Surgical History:  Past Surgical History:   Procedure Laterality Date   • APPENDECTOMY     • CARDIAC CATHETERIZATION     • CARDIAC SURGERY     • CARDIOVASCULAR STRESS TEST  04/21/2014    Dr SANDRA GÓMEZ 58%, pos for ischemia    • CARDIOVASCULAR STRESS TEST  06/26/2018    [facility]- nuclear stress test reported as normal. LVEF 62%   • COLONOSCOPY     • CONVERTED (HISTORICAL) HOLTER  03/04/2015    16 beat run of PAF baseline sinus dago at 50, no symptoms recorded   • CONVERTED (HISTORICAL) HOLTER  07/31/2018    72 hour- baseline sinus- minium 37 bpm and max 96 bpm, 36 pauses noted longest 2.3 sec, 4 beat SVT   • ECHO - CONVERTED  04/16/2014    heart and vascular Dr SANDRA GÓMEZ 65%, mild AR   • ENDOSCOPY     • FOOT SURGERY     • HERNIA REPAIR     • OTHER SURGICAL HISTORY  2015    Ecardio- sinus dago, continue observation   • PACEMAKER IMPLANTATION     • SHOULDER ARTHROSCOPY W/ ROTATOR CUFF REPAIR Right 3/19/2019    Procedure: SHOULDER ARTHROSCOPY WITH  OPEN ROTATOR CUFF REPAIR, ACOMIOPLASTY;  Surgeon: Tylor Berry MD;  Location: [facility];  Service: Orthopedics       Examination:   Shoulder evaluation reveals incision intact.  He can forward elevate to 60 degrees with mild discomfort.      Assessment & Plan:   73 y.o. male doing well at 6-week follow-up.  He is referred to physical therapy will begin with 2 weeks of passive motion and progressive active assisted for 3 weeks and then finished with 3 weeks of strengthening he will follow-up in 8 weeks time.         Diagnosis Plan   1. Complete tear of right rotator cuff     2. Postop check               Cc:  Jacquelin Turk, APRN            This document has been electronically signed by Tylor Berry MD   April 30, 2019 9:23 AM

## 2019-07-01 ENCOUNTER — HOSPITAL ENCOUNTER (OUTPATIENT)
Dept: GENERAL RADIOLOGY | Facility: HOSPITAL | Age: 74
Discharge: HOME | End: 2019-07-01
Admitting: ORTHOPAEDIC SURGERY

## 2019-07-01 ENCOUNTER — OFFICE VISIT (OUTPATIENT)
Dept: ORTHOPEDIC SURGERY | Facility: CLINIC | Age: 74
End: 2019-07-01

## 2019-07-01 VITALS
DIASTOLIC BLOOD PRESSURE: 62 MMHG | WEIGHT: 164.9 LBS | BODY MASS INDEX: 23.61 KG/M2 | HEIGHT: 70 IN | SYSTOLIC BLOOD PRESSURE: 100 MMHG | HEART RATE: 49 BPM

## 2019-07-01 DIAGNOSIS — M25.512 ACUTE PAIN OF LEFT SHOULDER: ICD-10-CM

## 2019-07-01 DIAGNOSIS — M25.512 LEFT SHOULDER PAIN, UNSPECIFIED CHRONICITY: Primary | ICD-10-CM

## 2019-07-01 DIAGNOSIS — M75.112 INCOMPLETE TEAR OF LEFT ROTATOR CUFF: Primary | ICD-10-CM

## 2019-07-01 PROCEDURE — 99214 OFFICE O/P EST MOD 30 MIN: CPT | Performed by: ORTHOPAEDIC SURGERY

## 2019-07-01 PROCEDURE — 73030 X-RAY EXAM OF SHOULDER: CPT

## 2019-07-01 PROCEDURE — 73030 X-RAY EXAM OF SHOULDER: CPT | Performed by: RADIOLOGY

## 2019-07-01 RX ORDER — CEFAZOLIN SODIUM 2 G/50ML
2 SOLUTION INTRAVENOUS ONCE
Status: CANCELLED | OUTPATIENT
Start: 2019-07-11 | End: 2019-07-01

## 2019-07-01 NOTE — PROGRESS NOTES
Follow-up with New Problem         Patient: Pavel Claudio  YOB: 1945  Date of Encounter: 07/01/2019      Chief  Complaint:   Chief Complaint   Patient presents with   • Right Shoulder - Post-op, Follow-up     03/19/19 (104d) Tylor Berry MD    SHOULDER ARTHROSCOPY WITH  OPEN ROTATOR CUFF REPAIR, ACOMIOPLASTY - Right       • Left Shoulder - Pain         HPI:  Pavel Claudio, 73 y.o. male     Medical History:  Patient Active Problem List   Diagnosis   • SSS (sick sinus syndrome) (CMS/[facility])   • Diabetes mellitus type 2, controlled, without complications (CMS/[facility])   • Paroxysmal atrial fibrillation (CMS/HCC)   • Medication management   • Bradycardia   • Chewing tobacco use   • Left axis deviation   • Current use of long term anticoagulation   • Complete tear of right rotator cuff   • Osteoarthritis of right AC (acromioclavicular) joint   • Impingement syndrome of right shoulder     Past Medical History:   Diagnosis Date   • Anemia    • Anxiety    • Arthritis    • BPH (benign prostatic hyperplasia)    • CAD (coronary artery disease)    • Cheekbone fracture (CMS/[facility])     surgical repair    • Diabetes mellitus (CMS/HCC)    • GERD (gastroesophageal reflux disease)    • H/O foot surgery     left foot   • History of hernia surgery    • Pacemaker    • PAF (paroxysmal atrial fibrillation) (CMS/[facility])        Social History:  Social History     Socioeconomic History   • Marital status:      Spouse name: Not on file   • Number of children: Not on file   • Years of education: Not on file   • Highest education level: Not on file   Tobacco Use   • Smoking status: Never Smoker   • Smokeless tobacco: Current User     Types: Chew   • Tobacco comment: patient counseled on effectsof tobacco use on health.    Substance and Sexual Activity   • Alcohol use: No   • Drug use: No   • Sexual activity: Defer     Partners: Female     Birth control/protection: None       Surgical History:  Past Surgical History:      Procedure Laterality Date   • APPENDECTOMY     • CARDIAC CATHETERIZATION     • CARDIAC SURGERY     • CARDIOVASCULAR STRESS TEST  04/21/2014    Dr FLORES EF 58%, pos for ischemia    • CARDIOVASCULAR STRESS TEST  06/26/2018    [facility]- nuclear stress test reported as normal. LVEF 62%   • COLONOSCOPY     • CONVERTED (HISTORICAL) HOLTER  03/04/2015    16 beat run of PAF baseline sinus dago at 50, no symptoms recorded   • CONVERTED (HISTORICAL) HOLTER  07/31/2018    72 hour- baseline sinus- minium 37 bpm and max 96 bpm, 36 pauses noted longest 2.3 sec, 4 beat SVT   • ECHO - CONVERTED  04/16/2014    heart and vascular Dr FLORES EF 65%, mild AR   • ENDOSCOPY     • FOOT SURGERY     • HERNIA REPAIR     • OTHER SURGICAL HISTORY  2015    Ecardio- sinus dago, continue observation   • PACEMAKER IMPLANTATION     • SHOULDER ARTHROSCOPY W/ ROTATOR CUFF REPAIR Right 3/19/2019    Procedure: SHOULDER ARTHROSCOPY WITH  OPEN ROTATOR CUFF REPAIR, ACOMIOPLASTY;  Surgeon: Tylor Berry MD;  Location: [facility];  Service: Orthopedics       Radiology:       Examination:       Assessment & Plan:   73 y.o. male         No diagnosis found.          Cc:  Jacquelin Turk, APRN  No ref. provider found            This document has been electronically signed by Tylor Berry MD   July 1, 2019 9:37 AM

## 2019-07-01 NOTE — H&P (VIEW-ONLY)
History and Physical      Patient: Pavel Claudio  YOB: 1945  Date of Encounter: 07/01/2019      Chief Complaint:   Chief Complaint   Patient presents with   • Right Shoulder - Post-op, Follow-up     03/19/19 (104d) Tylor Berry MD    SHOULDER ARTHROSCOPY WITH  OPEN ROTATOR CUFF REPAIR, ACOMIOPLASTY - Right       • Left Shoulder - Pain       HPI:   Pavel Claudio, 73 y.o. male, seen today for evaluation of left shoulder pain.  He has undergone repair of massive right shoulder rotator cuff tear March 19, 2019.  He is unable to undergo MRI due to pacemaker..  Intraoperatively he was found to have very large tear which was repaired surprisingly he reports little if any pain in his function is very good.  He has completed his physical therapy right shoulder.  His left shoulder symptoms began about 2 weeks ago.  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  He has had pain since with difficulty sleeping he reports that his symptoms are identical to his right shoulder complaints prior to his rotator cuff repair.      Active Problem List:  Patient Active Problem List   Diagnosis   • SSS (sick sinus syndrome) (CMS/[facility])   • Diabetes mellitus type 2, controlled, without complications (CMS/[facility])   • Paroxysmal atrial fibrillation (CMS/[facility])   • Medication management   • Bradycardia   • Chewing tobacco use   • Left axis deviation   • Current use of long term anticoagulation   • Complete tear of right rotator cuff   • Osteoarthritis of right AC (acromioclavicular) joint   • Impingement syndrome of right shoulder   • Incomplete tear of left rotator cuff   • Acute pain of left shoulder         Past Medical History:  Past Medical History:   Diagnosis Date   • Anemia    • Anxiety    • Arthritis    • BPH (benign prostatic hyperplasia)    • CAD (coronary artery disease)    • Cheekbone fracture (CMS/[facility])     surgical repair    • Diabetes mellitus  (CMS/HCC)    • GERD (gastroesophageal reflux disease)    • H/O foot surgery     left foot   • History of hernia surgery    • Pacemaker    • PAF (paroxysmal atrial fibrillation) (CMS/HCC)          Past Surgical History:  Past Surgical History:   Procedure Laterality Date   • APPENDECTOMY     • CARDIAC CATHETERIZATION     • CARDIAC SURGERY     • CARDIOVASCULAR STRESS TEST  04/21/2014    Dr SANDRA GÓMEZ 58%, pos for ischemia    • CARDIOVASCULAR STRESS TEST  06/26/2018    [facility]- nuclear stress test reported as normal. LVEF 62%   • COLONOSCOPY     • CONVERTED (HISTORICAL) HOLTER  03/04/2015    16 beat run of PAF baseline sinus dago at 50, no symptoms recorded   • CONVERTED (HISTORICAL) HOLTER  07/31/2018    72 hour- baseline sinus- minium 37 bpm and max 96 bpm, 36 pauses noted longest 2.3 sec, 4 beat SVT   • ECHO - CONVERTED  04/16/2014    heart and vascular Dr SANDRA GÓMEZ 65%, mild AR   • ENDOSCOPY     • FOOT SURGERY     • HERNIA REPAIR     • OTHER SURGICAL HISTORY  2015    Ecardio- sinus dago, continue observation   • PACEMAKER IMPLANTATION     • SHOULDER ARTHROSCOPY W/ ROTATOR CUFF REPAIR Right 3/19/2019    Procedure: SHOULDER ARTHROSCOPY WITH  OPEN ROTATOR CUFF REPAIR, ACOMIOPLASTY;  Surgeon: Tylor Berry MD;  Location: [facility];  Service: Orthopedics         Family History:  Family History   Problem Relation Age of Onset   • Lung disease Mother    • Cancer Sister    • Cancer Brother          Social History:  Social History     Socioeconomic History   • Marital status:      Spouse name: Not on file   • Number of children: Not on file   • Years of education: Not on file   • Highest education level: Not on file   Tobacco Use   • Smoking status: Never Smoker   • Smokeless tobacco: Current User     Types: Chew   • Tobacco comment: patient counseled on effectsof tobacco use on health.    Substance and Sexual Activity   • Alcohol use: No   • Drug use: No   • Sexual activity: Defer     Partners: Female     Birth  control/protection: None     Body mass index is 23.66 kg/m².      Medications:  Current Outpatient Medications   Medication Sig Dispense Refill   • alfuzosin (UROXATRAL) 10 MG 24 hr tablet Take 10 mg by mouth Every Night.     • apixaban (ELIQUIS) 5 MG tablet tablet Take 1 tablet by mouth Every 12 (Twelve) Hours. 180 tablet 3   • dicyclomine (BENTYL) 20 MG tablet Take 20 mg by mouth 2 (Two) Times a Day.     • ferrous sulfate 325 (65 FE) MG tablet Take 325 mg by mouth Daily With Breakfast.     • lansoprazole (PREVACID) 30 MG capsule Take 30 mg by mouth 2 (two) times a day.     • MEGARED OMEGA-3 KRILL OIL PO Take 1 capsule by mouth Daily.     • metFORMIN (GLUCOPHAGE) 500 MG tablet Take 500 mg by mouth daily with breakfast.     • pregabalin (LYRICA) 150 MG capsule Take 150 mg by mouth 2 (Two) Times a Day.     • rOPINIRole (REQUIP) 0.25 MG tablet Take 0.25 mg by mouth 2 (Two) Times a Day. Take 1 hour before bedtime.      • mirtazapine (REMERON) 30 MG tablet Take 15 mg by mouth Every Night.       No current facility-administered medications for this visit.          Allergies:  No Known Allergies      Review of Systems:   Review of Systems   Constitutional: Negative.    HENT: Negative.    Eyes: Negative.    Respiratory: Negative.    Cardiovascular: Negative.    Gastrointestinal: Negative.    Endocrine: Negative.    Genitourinary: Negative.    Musculoskeletal: Positive for arthralgias.   Skin: Negative.    Allergic/Immunologic: Negative.    Hematological: Negative.    Psychiatric/Behavioral: Negative.          Physical Exam:   Physical Exam   Constitutional: He is oriented to person, place, and time. He appears well-developed and well-nourished.   HENT:   Head: Normocephalic and atraumatic.   Mouth/Throat: Oropharynx is clear and moist.   Eyes: Conjunctivae and EOM are normal. Pupils are equal, round, and reactive to light.   Neck: Normal range of motion. Neck supple. No JVD present. No thyromegaly present.    "  Cardiovascular: Normal rate, regular rhythm and normal heart sounds. Exam reveals no gallop and no friction rub.   No murmur heard.  Pulmonary/Chest: Effort normal and breath sounds normal. No respiratory distress. He has no wheezes. He has no rales. He exhibits no tenderness.   Abdominal: Bowel sounds are normal.   Lymphadenopathy:     He has no cervical adenopathy.   Neurological: He is alert and oriented to person, place, and time.   Skin: Skin is warm and dry.   Psychiatric: He has a normal mood and affect.   Nursing note and vitals reviewed.    GENERAL: 73 y.o. male, alert and oriented X 3 in no acute distress.   Visit Vitals  /62   Pulse (!) 49   Ht 177.8 cm (70\")   Wt 74.8 kg (164 lb 14.5 oz)   BMI 23.66 kg/m²       Musculoskeletal: Left shoulder evaluation reveals imminent ability to forward elevate to 80 degrees.  With Jobes maneuver he has marked weakness and significant pain.  He has good strength with internal and external rotation.  He has minimal tenderness over the acromioclavicular joint and minimal pain with crossarm adduction.  Bicipital groove is nontender biceps muscle is of normal contour.    Radiology/Labs: Left shoulder radiographs show no arthritic changes within the glenohumeral joint no arthritic changes within the acromioclavicular joint normal subacromial space.      Assessment & Plan:   73 y.o. male with clinical presentation consistent with rotator cuff tear left shoulder.  He does not wish to allow his left shoulder to progress to severity of his right shoulder and is very interested in being repair of his rotator cuff now.  Even his local findings of significant weakness and description confident that he does have rotator cuff pathology.  We will therefore offer him left shoulder arthroscopy with possible open rotator cuff repair acromioplasty.  Surgery to be scheduled [facility] 7/11/2019.      ICD-10-CM ICD-9-CM   1. Incomplete tear of left rotator cuff M75.112 840.4 "   2. Acute pain of left shoulder M25.512 719.41           Cc:   Jacquelin Turk, RAISSA              This document has been electronically signed by Tylor Berry MD   July 10, 2019 7:54 PM

## 2019-07-01 NOTE — PROGRESS NOTES
History and Physical      Patient: Pavel Claudio  YOB: 1945  Date of Encounter: 07/01/2019      Chief Complaint:   Chief Complaint   Patient presents with   • Right Shoulder - Post-op, Follow-up     03/19/19 (104d) Tylor Berry MD    SHOULDER ARTHROSCOPY WITH  OPEN ROTATOR CUFF REPAIR, ACOMIOPLASTY - Right       • Left Shoulder - Pain       HPI:   Pavel Claudio, 73 y.o. male, seen today for evaluation of left shoulder pain.  He has undergone repair of massive right shoulder rotator cuff tear March 19, 2019.  He is unable to undergo MRI due to pacemaker..  Intraoperatively he was found to have very large tear which was repaired surprisingly he reports little if any pain in his function is very good.  He has completed his physical therapy right shoulder.  His left shoulder symptoms began about 2 weeks ago.  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  He has had pain since with difficulty sleeping he reports that his symptoms are identical to his right shoulder complaints prior to his rotator cuff repair.      Active Problem List:  Patient Active Problem List   Diagnosis   • SSS (sick sinus syndrome) (CMS/[facility])   • Diabetes mellitus type 2, controlled, without complications (CMS/[facility])   • Paroxysmal atrial fibrillation (CMS/[facility])   • Medication management   • Bradycardia   • Chewing tobacco use   • Left axis deviation   • Current use of long term anticoagulation   • Complete tear of right rotator cuff   • Osteoarthritis of right AC (acromioclavicular) joint   • Impingement syndrome of right shoulder   • Incomplete tear of left rotator cuff   • Acute pain of left shoulder         Past Medical History:  Past Medical History:   Diagnosis Date   • Anemia    • Anxiety    • Arthritis    • BPH (benign prostatic hyperplasia)    • CAD (coronary artery disease)    • Cheekbone fracture (CMS/[facility])     surgical repair    • Diabetes mellitus  (CMS/HCC)    • GERD (gastroesophageal reflux disease)    • H/O foot surgery     left foot   • History of hernia surgery    • Pacemaker    • PAF (paroxysmal atrial fibrillation) (CMS/HCC)          Past Surgical History:  Past Surgical History:   Procedure Laterality Date   • APPENDECTOMY     • CARDIAC CATHETERIZATION     • CARDIAC SURGERY     • CARDIOVASCULAR STRESS TEST  04/21/2014    Dr SANDRA GÓMEZ 58%, pos for ischemia    • CARDIOVASCULAR STRESS TEST  06/26/2018    [facility]- nuclear stress test reported as normal. LVEF 62%   • COLONOSCOPY     • CONVERTED (HISTORICAL) HOLTER  03/04/2015    16 beat run of PAF baseline sinus dago at 50, no symptoms recorded   • CONVERTED (HISTORICAL) HOLTER  07/31/2018    72 hour- baseline sinus- minium 37 bpm and max 96 bpm, 36 pauses noted longest 2.3 sec, 4 beat SVT   • ECHO - CONVERTED  04/16/2014    heart and vascular Dr SANDRA GÓMEZ 65%, mild AR   • ENDOSCOPY     • FOOT SURGERY     • HERNIA REPAIR     • OTHER SURGICAL HISTORY  2015    Ecardio- sinus dago, continue observation   • PACEMAKER IMPLANTATION     • SHOULDER ARTHROSCOPY W/ ROTATOR CUFF REPAIR Right 3/19/2019    Procedure: SHOULDER ARTHROSCOPY WITH  OPEN ROTATOR CUFF REPAIR, ACOMIOPLASTY;  Surgeon: Tylor Berry MD;  Location: [facility];  Service: Orthopedics         Family History:  Family History   Problem Relation Age of Onset   • Lung disease Mother    • Cancer Sister    • Cancer Brother          Social History:  Social History     Socioeconomic History   • Marital status:      Spouse name: Not on file   • Number of children: Not on file   • Years of education: Not on file   • Highest education level: Not on file   Tobacco Use   • Smoking status: Never Smoker   • Smokeless tobacco: Current User     Types: Chew   • Tobacco comment: patient counseled on effectsof tobacco use on health.    Substance and Sexual Activity   • Alcohol use: No   • Drug use: No   • Sexual activity: Defer     Partners: Female     Birth  control/protection: None     Body mass index is 23.66 kg/m².      Medications:  Current Outpatient Medications   Medication Sig Dispense Refill   • alfuzosin (UROXATRAL) 10 MG 24 hr tablet Take 10 mg by mouth Every Night.     • apixaban (ELIQUIS) 5 MG tablet tablet Take 1 tablet by mouth Every 12 (Twelve) Hours. 180 tablet 3   • dicyclomine (BENTYL) 20 MG tablet Take 20 mg by mouth 2 (Two) Times a Day.     • ferrous sulfate 325 (65 FE) MG tablet Take 325 mg by mouth Daily With Breakfast.     • lansoprazole (PREVACID) 30 MG capsule Take 30 mg by mouth 2 (two) times a day.     • MEGARED OMEGA-3 KRILL OIL PO Take 1 capsule by mouth Daily.     • metFORMIN (GLUCOPHAGE) 500 MG tablet Take 500 mg by mouth daily with breakfast.     • pregabalin (LYRICA) 150 MG capsule Take 150 mg by mouth 2 (Two) Times a Day.     • rOPINIRole (REQUIP) 0.25 MG tablet Take 0.25 mg by mouth 2 (Two) Times a Day. Take 1 hour before bedtime.      • mirtazapine (REMERON) 30 MG tablet Take 15 mg by mouth Every Night.       No current facility-administered medications for this visit.          Allergies:  No Known Allergies      Review of Systems:   Review of Systems   Constitutional: Negative.    HENT: Negative.    Eyes: Negative.    Respiratory: Negative.    Cardiovascular: Negative.    Gastrointestinal: Negative.    Endocrine: Negative.    Genitourinary: Negative.    Musculoskeletal: Positive for arthralgias.   Skin: Negative.    Allergic/Immunologic: Negative.    Hematological: Negative.    Psychiatric/Behavioral: Negative.          Physical Exam:   Physical Exam   Constitutional: He is oriented to person, place, and time. He appears well-developed and well-nourished.   HENT:   Head: Normocephalic and atraumatic.   Mouth/Throat: Oropharynx is clear and moist.   Eyes: Conjunctivae and EOM are normal. Pupils are equal, round, and reactive to light.   Neck: Normal range of motion. Neck supple. No JVD present. No thyromegaly present.  "  Cardiovascular: Normal rate, regular rhythm and normal heart sounds. Exam reveals no gallop and no friction rub.   No murmur heard.  Pulmonary/Chest: Effort normal and breath sounds normal. No respiratory distress. He has no wheezes. He has no rales. He exhibits no tenderness.   Abdominal: Bowel sounds are normal.   Lymphadenopathy:     He has no cervical adenopathy.   Neurological: He is alert and oriented to person, place, and time.   Skin: Skin is warm and dry.   Psychiatric: He has a normal mood and affect.   Nursing note and vitals reviewed.    GENERAL: 73 y.o. male, alert and oriented X 3 in no acute distress.   Visit Vitals  /62   Pulse (!) 49   Ht 177.8 cm (70\")   Wt 74.8 kg (164 lb 14.5 oz)   BMI 23.66 kg/m²       Musculoskeletal: Left shoulder evaluation reveals imminent ability to forward elevate to 80 degrees.  With Jobes maneuver he has marked weakness and significant pain.  He has good strength with internal and external rotation.  He has minimal tenderness over the acromioclavicular joint and minimal pain with crossarm adduction.  Bicipital groove is nontender biceps muscle is of normal contour.    Radiology/Labs: Left shoulder radiographs show no arthritic changes within the glenohumeral joint no arthritic changes within the acromioclavicular joint normal subacromial space.      Assessment & Plan:   73 y.o. male with clinical presentation consistent with rotator cuff tear left shoulder.  He does not wish to allow his left shoulder to progress to severity of his right shoulder and is very interested in being repair of his rotator cuff now.  Even his local findings of significant weakness and description confident that he does have rotator cuff pathology.  We will therefore offer him left shoulder arthroscopy with possible open rotator cuff repair acromioplasty.  Surgery to be scheduled [facility] 7/11/2019.      ICD-10-CM ICD-9-CM   1. Incomplete tear of left rotator cuff M75.112 840.4 "   2. Acute pain of left shoulder M25.512 719.41           Cc:   Jacquelin Turk, RAISSA              This document has been electronically signed by Tylor Berry MD   July 10, 2019 7:54 PM

## 2019-07-02 ENCOUNTER — TELEPHONE (OUTPATIENT)
Dept: ORTHOPEDIC SURGERY | Facility: CLINIC | Age: 74
End: 2019-07-02

## 2019-07-02 PROBLEM — M25.512 ACUTE PAIN OF LEFT SHOULDER: Status: ACTIVE | Noted: 2019-07-02

## 2019-07-02 PROBLEM — M75.112 INCOMPLETE TEAR OF LEFT ROTATOR CUFF: Status: ACTIVE | Noted: 2019-07-02

## 2019-07-05 ENCOUNTER — APPOINTMENT (OUTPATIENT)
Dept: PREADMISSION TESTING | Facility: HOSPITAL | Age: 74
End: 2019-07-05

## 2019-07-05 LAB
ANION GAP SERPL CALCULATED.3IONS-SCNC: 11.7 MMOL/L (ref 5–15)
BUN BLD-MCNC: 19 MG/DL (ref 8–23)
BUN/CREAT SERPL: 15.8 (ref 7–25)
CALCIUM SPEC-SCNC: 9.5 MG/DL (ref 8.6–10.5)
CHLORIDE SERPL-SCNC: 108 MMOL/L (ref 98–107)
CO2 SERPL-SCNC: 22.3 MMOL/L (ref 22–29)
CREAT BLD-MCNC: 1.2 MG/DL (ref 0.76–1.27)
DEPRECATED RDW RBC AUTO: 43.2 FL (ref 37–54)
ERYTHROCYTE [DISTWIDTH] IN BLOOD BY AUTOMATED COUNT: 13.3 % (ref 12.3–15.4)
GFR SERPL CREATININE-BSD FRML MDRD: 59 ML/MIN/1.73
GLUCOSE BLD-MCNC: 150 MG/DL (ref 65–99)
HCT VFR BLD AUTO: 42.2 % (ref 37.5–51)
HGB BLD-MCNC: 13.5 G/DL (ref 13–17.7)
MCH RBC QN AUTO: 28.9 PG (ref 26.6–33)
MCHC RBC AUTO-ENTMCNC: 32 G/DL (ref 31.5–35.7)
MCV RBC AUTO: 90.4 FL (ref 79–97)
PLATELET # BLD AUTO: 241 10*3/MM3 (ref 140–450)
PMV BLD AUTO: 11 FL (ref 6–12)
POTASSIUM BLD-SCNC: 4.1 MMOL/L (ref 3.5–5.2)
RBC # BLD AUTO: 4.67 10*6/MM3 (ref 4.14–5.8)
SODIUM BLD-SCNC: 142 MMOL/L (ref 136–145)
WBC NRBC COR # BLD: 6.75 10*3/MM3 (ref 3.4–10.8)

## 2019-07-05 PROCEDURE — 85027 COMPLETE CBC AUTOMATED: CPT | Performed by: ANESTHESIOLOGY

## 2019-07-05 PROCEDURE — 36415 COLL VENOUS BLD VENIPUNCTURE: CPT

## 2019-07-05 PROCEDURE — 80048 BASIC METABOLIC PNL TOTAL CA: CPT | Performed by: ANESTHESIOLOGY

## 2019-07-05 RX ORDER — MIRTAZAPINE 30 MG/1
15 TABLET, FILM COATED ORAL NIGHTLY
Status: ON HOLD | COMMUNITY
End: 2019-08-13

## 2019-07-05 NOTE — DISCHARGE INSTRUCTIONS
Procedure Date 7/11/19, Arrival Time 6:30AM    TAKE the following medications the morning of surgery:  All heart or blood pressure medications    HOLD all diabetic medications the morning of surgery as ordered by physician.    Please discontinue all blood thinners and anticoagulants (except aspirin) prior to surgery as per your surgeon and cardiologist instructions.  Aspirin may be continued up to the day prior to surgery.     CHLORHEXIDINE CLOTHS GIVEN WITH INSTRUCTIONS AND FORM TO RETURN TO HOSPITAL    General Instructions:  · Do not eat or drink after midnight 7/10/19: includes water, mints, or gum. You may brush your teeth.    · Dental appliances that are removable must be taken out day of surgery.  · Do not smoke, chew tobacco, or drink alcohol.  · Bring medications in original bottles, any inhalers and if applicable your C-PAP/BI-PAP machine.  · Bring any papers given to you in the doctor's office.  · Wear clean comfortable clothes and socks.  · Do not wear contact lenses or make-up. Bring a case for your glasses if applicable.  · Bring crutches or walker if applicable.  · Leave all other valuables and jewelry at home.    If you were given a blood bank ID arm band remember to bring it with you the day of surgery.    Preventing a Surgical Site Infection:  Shower the night before surgery (unless instructed other wise) using a fresh bar of anti-bacterial soap (such as Dial) and clean washcloth. Dry with a clean towel and dress in clean clothing.  For 2 to 3 days before surgery, avoid shaving with a razor near where you will have surgery because the razor can irritate skin and make it easier to develop an infection. Ask your surgeon if you will be receiving antibiotics prior to surgery.  Make sure you, your family, and all healthcare providers clear their hands with soap and water or an alcohol-based hand  before caring for you or your wound.  If at all possible, quit smoking as many days before surgery  as you can.    Day of surgery:  Upon arrival, a Pre-op nurse and Anesthesiologist will review your health history, obtain vital signs, and answer questions you may have. The only belongings needed at this time will be your home medications and if applicable your C-PAP/BI-PAP machine. If you are staying overnight your family can leave the rest of your belongings in the car and bring them to your room later. A Pre-op nurse will start an IV and you may receive medication in preparation for surgery, including something to help you relax. Your family will be able to see you in the Pre-op area. While you are in surgery your family should notify the waiting room  if they leave the waiting room area and provide a contact phone number.    Please be aware that surgery does come with discomfort. We want to make every effort to control your discomfort so please discuss any uncontrolled symptoms with your nurse. Your doctor will most likely have prescribed pain medications.    If you are going home after surgery you will receive individualized written care instructions before being discharged. A responsible adult must drive you to and from the hospital on the day of surgery and stay with you for 24 hours.    If you are staying overnight following surgery, you will be transported to your hospital room following the recovery period.  [facility] has all private rooms.    If you have any questions please call Pre-Admission Testing at [phone].  Deductibles and co-payments are collected on the day of service. Please be prepared to pay the required co-pay, deductible or deposit on the day of service as defined by your plan.

## 2019-07-10 ENCOUNTER — ANESTHESIA EVENT (OUTPATIENT)
Dept: PERIOP | Facility: HOSPITAL | Age: 74
End: 2019-07-10

## 2019-07-10 ENCOUNTER — TELEPHONE (OUTPATIENT)
Dept: ORTHOPEDIC SURGERY | Facility: CLINIC | Age: 74
End: 2019-07-10

## 2019-07-10 NOTE — TELEPHONE ENCOUNTER
Patient was notified concerning SX arrival time 7-11-19@6:30. Patients wife Yuly verbalized understanding.

## 2019-07-11 ENCOUNTER — APPOINTMENT (OUTPATIENT)
Dept: GENERAL RADIOLOGY | Facility: HOSPITAL | Age: 74
End: 2019-07-11

## 2019-07-11 ENCOUNTER — ANESTHESIA (OUTPATIENT)
Dept: PERIOP | Facility: HOSPITAL | Age: 74
End: 2019-07-11

## 2019-07-11 ENCOUNTER — HOSPITAL ENCOUNTER (OUTPATIENT)
Facility: HOSPITAL | Age: 74
Setting detail: HOSPITAL OUTPATIENT SURGERY
Discharge: HOME | End: 2019-07-11
Attending: ORTHOPAEDIC SURGERY | Admitting: ORTHOPAEDIC SURGERY

## 2019-07-11 ENCOUNTER — HOSPITAL ENCOUNTER (OUTPATIENT)
Facility: HOSPITAL | Age: 74
Setting detail: OBSERVATION
Discharge: HOME | End: 2019-07-12
Attending: EMERGENCY MEDICINE | Admitting: EMERGENCY MEDICINE

## 2019-07-11 VITALS
HEART RATE: 62 BPM | OXYGEN SATURATION: 95 % | HEIGHT: 70 IN | DIASTOLIC BLOOD PRESSURE: 81 MMHG | WEIGHT: 164 LBS | BODY MASS INDEX: 23.48 KG/M2 | SYSTOLIC BLOOD PRESSURE: 127 MMHG | TEMPERATURE: 97.6 F | RESPIRATION RATE: 18 BRPM

## 2019-07-11 DIAGNOSIS — M25.512 ACUTE POSTOPERATIVE PAIN OF LEFT SHOULDER: Primary | ICD-10-CM

## 2019-07-11 DIAGNOSIS — M75.112 INCOMPLETE TEAR OF LEFT ROTATOR CUFF: ICD-10-CM

## 2019-07-11 DIAGNOSIS — M25.512 ACUTE PAIN OF LEFT SHOULDER: ICD-10-CM

## 2019-07-11 DIAGNOSIS — G89.18 ACUTE POSTOPERATIVE PAIN OF LEFT SHOULDER: Primary | ICD-10-CM

## 2019-07-11 LAB
ALBUMIN SERPL-MCNC: 3.85 G/DL (ref 3.5–5.2)
ALBUMIN/GLOB SERPL: 1.4 G/DL
ALP SERPL-CCNC: 91 U/L (ref 39–117)
ALT SERPL W P-5'-P-CCNC: 14 U/L (ref 1–41)
ANION GAP SERPL CALCULATED.3IONS-SCNC: 13.8 MMOL/L (ref 5–15)
AST SERPL-CCNC: 20 U/L (ref 1–40)
BASOPHILS # BLD AUTO: 0.01 10*3/MM3 (ref 0–0.2)
BASOPHILS NFR BLD AUTO: 0.1 % (ref 0–1.5)
BILIRUB SERPL-MCNC: 0.3 MG/DL (ref 0.2–1.2)
BUN BLD-MCNC: 14 MG/DL (ref 8–23)
BUN/CREAT SERPL: 15.4 (ref 7–25)
CALCIUM SPEC-SCNC: 8.7 MG/DL (ref 8.6–10.5)
CHLORIDE SERPL-SCNC: 105 MMOL/L (ref 98–107)
CO2 SERPL-SCNC: 19.2 MMOL/L (ref 22–29)
CREAT BLD-MCNC: 0.91 MG/DL (ref 0.76–1.27)
DEPRECATED RDW RBC AUTO: 42.9 FL (ref 37–54)
EOSINOPHIL # BLD AUTO: 0.02 10*3/MM3 (ref 0–0.4)
EOSINOPHIL NFR BLD AUTO: 0.1 % (ref 0.3–6.2)
ERYTHROCYTE [DISTWIDTH] IN BLOOD BY AUTOMATED COUNT: 13.1 % (ref 12.3–15.4)
GFR SERPL CREATININE-BSD FRML MDRD: 82 ML/MIN/1.73
GLOBULIN UR ELPH-MCNC: 2.9 GM/DL
GLUCOSE BLD-MCNC: 270 MG/DL (ref 65–99)
GLUCOSE BLDC GLUCOMTR-MCNC: 124 MG/DL (ref 70–130)
HCT VFR BLD AUTO: 38.2 % (ref 37.5–51)
HGB BLD-MCNC: 12.3 G/DL (ref 13–17.7)
HOLD SPECIMEN: NORMAL
HOLD SPECIMEN: NORMAL
IMM GRANULOCYTES # BLD AUTO: 0.04 10*3/MM3 (ref 0–0.05)
IMM GRANULOCYTES NFR BLD AUTO: 0.3 % (ref 0–0.5)
LYMPHOCYTES # BLD AUTO: 0.67 10*3/MM3 (ref 0.7–3.1)
LYMPHOCYTES NFR BLD AUTO: 5 % (ref 19.6–45.3)
MCH RBC QN AUTO: 29.6 PG (ref 26.6–33)
MCHC RBC AUTO-ENTMCNC: 32.2 G/DL (ref 31.5–35.7)
MCV RBC AUTO: 91.8 FL (ref 79–97)
MONOCYTES # BLD AUTO: 0.92 10*3/MM3 (ref 0.1–0.9)
MONOCYTES NFR BLD AUTO: 6.9 % (ref 5–12)
NEUTROPHILS # BLD AUTO: 11.69 10*3/MM3 (ref 1.7–7)
NEUTROPHILS NFR BLD AUTO: 87.6 % (ref 42.7–76)
PLATELET # BLD AUTO: 200 10*3/MM3 (ref 140–450)
PMV BLD AUTO: 11 FL (ref 6–12)
POTASSIUM BLD-SCNC: 3.9 MMOL/L (ref 3.5–5.2)
PROT SERPL-MCNC: 6.7 G/DL (ref 6–8.5)
RBC # BLD AUTO: 4.16 10*6/MM3 (ref 4.14–5.8)
SODIUM BLD-SCNC: 138 MMOL/L (ref 136–145)
TROPONIN T SERPL-MCNC: <0.01 NG/ML (ref 0–0.03)
WBC NRBC COR # BLD: 13.35 10*3/MM3 (ref 3.4–10.8)
WHOLE BLOOD HOLD SPECIMEN: NORMAL
WHOLE BLOOD HOLD SPECIMEN: NORMAL

## 2019-07-11 PROCEDURE — 99284 EMERGENCY DEPT VISIT MOD MDM: CPT

## 2019-07-11 PROCEDURE — L3660 SO 8 AB RSTR CAN/WEB PRE OTS: HCPCS | Performed by: ORTHOPAEDIC SURGERY

## 2019-07-11 PROCEDURE — G0378 HOSPITAL OBSERVATION PER HR: HCPCS

## 2019-07-11 PROCEDURE — 96376 TX/PRO/DX INJ SAME DRUG ADON: CPT

## 2019-07-11 PROCEDURE — 25010000002 FENTANYL CITRATE (PF) 100 MCG/2ML SOLUTION: Performed by: ANESTHESIOLOGY

## 2019-07-11 PROCEDURE — 96375 TX/PRO/DX INJ NEW DRUG ADDON: CPT

## 2019-07-11 PROCEDURE — 25010000002 ONDANSETRON PER 1 MG: Performed by: NURSE ANESTHETIST, CERTIFIED REGISTERED

## 2019-07-11 PROCEDURE — 96374 THER/PROPH/DIAG INJ IV PUSH: CPT

## 2019-07-11 PROCEDURE — 25010000002 FENTANYL CITRATE (PF) 100 MCG/2ML SOLUTION: Performed by: NURSE ANESTHETIST, CERTIFIED REGISTERED

## 2019-07-11 PROCEDURE — 25010000003 CEFAZOLIN PER 500 MG: Performed by: ORTHOPAEDIC SURGERY

## 2019-07-11 PROCEDURE — 93010 ELECTROCARDIOGRAM REPORT: CPT | Performed by: INTERNAL MEDICINE

## 2019-07-11 PROCEDURE — 93005 ELECTROCARDIOGRAM TRACING: CPT | Performed by: EMERGENCY MEDICINE

## 2019-07-11 PROCEDURE — 23412 REPAIR ROTATOR CUFF CHRONIC: CPT | Performed by: ORTHOPAEDIC SURGERY

## 2019-07-11 PROCEDURE — 25010000002 ROPIVACAINE PER 1 MG: Performed by: ANESTHESIOLOGY

## 2019-07-11 PROCEDURE — 25010000002 HYDROMORPHONE PER 4 MG: Performed by: ORTHOPAEDIC SURGERY

## 2019-07-11 PROCEDURE — 25010000002 HYDROMORPHONE PER 4 MG: Performed by: EMERGENCY MEDICINE

## 2019-07-11 PROCEDURE — 25010000002 MIDAZOLAM PER 1 MG: Performed by: ANESTHESIOLOGY

## 2019-07-11 PROCEDURE — 85025 COMPLETE CBC W/AUTO DIFF WBC: CPT | Performed by: EMERGENCY MEDICINE

## 2019-07-11 PROCEDURE — 25010000002 PROPOFOL 10 MG/ML EMULSION: Performed by: NURSE ANESTHETIST, CERTIFIED REGISTERED

## 2019-07-11 PROCEDURE — 84484 ASSAY OF TROPONIN QUANT: CPT | Performed by: EMERGENCY MEDICINE

## 2019-07-11 PROCEDURE — 25010000002 ONDANSETRON PER 1 MG: Performed by: EMERGENCY MEDICINE

## 2019-07-11 PROCEDURE — 96361 HYDRATE IV INFUSION ADD-ON: CPT

## 2019-07-11 PROCEDURE — 82962 GLUCOSE BLOOD TEST: CPT

## 2019-07-11 PROCEDURE — 80053 COMPREHEN METABOLIC PANEL: CPT | Performed by: EMERGENCY MEDICINE

## 2019-07-11 RX ORDER — OXYCODONE HYDROCHLORIDE AND ACETAMINOPHEN 5; 325 MG/1; MG/1
1 TABLET ORAL ONCE AS NEEDED
Status: DISCONTINUED | OUTPATIENT
Start: 2019-07-11 | End: 2019-07-11 | Stop reason: HOSPADM

## 2019-07-11 RX ORDER — SODIUM CHLORIDE 0.9 % (FLUSH) 0.9 %
3-10 SYRINGE (ML) INJECTION AS NEEDED
Status: DISCONTINUED | OUTPATIENT
Start: 2019-07-11 | End: 2019-07-11 | Stop reason: HOSPADM

## 2019-07-11 RX ORDER — MIDAZOLAM HYDROCHLORIDE 1 MG/ML
2 INJECTION INTRAMUSCULAR; INTRAVENOUS
Status: DISCONTINUED | OUTPATIENT
Start: 2019-07-11 | End: 2019-07-11 | Stop reason: HOSPADM

## 2019-07-11 RX ORDER — MAGNESIUM HYDROXIDE 1200 MG/15ML
LIQUID ORAL AS NEEDED
Status: DISCONTINUED | OUTPATIENT
Start: 2019-07-11 | End: 2019-07-11 | Stop reason: HOSPADM

## 2019-07-11 RX ORDER — MIRTAZAPINE 15 MG/1
15 TABLET, FILM COATED ORAL NIGHTLY
Status: CANCELLED | OUTPATIENT
Start: 2019-07-11

## 2019-07-11 RX ORDER — PANTOPRAZOLE SODIUM 40 MG/1
40 TABLET, DELAYED RELEASE ORAL EVERY MORNING
Status: CANCELLED | OUTPATIENT
Start: 2019-07-11

## 2019-07-11 RX ORDER — TAMSULOSIN HYDROCHLORIDE 0.4 MG/1
0.4 CAPSULE ORAL NIGHTLY
Status: CANCELLED | OUTPATIENT
Start: 2019-07-11

## 2019-07-11 RX ORDER — SODIUM CHLORIDE 0.9 % (FLUSH) 0.9 %
3 SYRINGE (ML) INJECTION EVERY 12 HOURS SCHEDULED
Status: DISCONTINUED | OUTPATIENT
Start: 2019-07-11 | End: 2019-07-11 | Stop reason: HOSPADM

## 2019-07-11 RX ORDER — MIRTAZAPINE 15 MG/1
15 TABLET, FILM COATED ORAL NIGHTLY
Status: DISCONTINUED | OUTPATIENT
Start: 2019-07-11 | End: 2019-07-12 | Stop reason: HOSPADM

## 2019-07-11 RX ORDER — ROPINIROLE 0.25 MG/1
0.25 TABLET, FILM COATED ORAL 2 TIMES DAILY
Status: CANCELLED | OUTPATIENT
Start: 2019-07-11

## 2019-07-11 RX ORDER — PANTOPRAZOLE SODIUM 40 MG/1
40 TABLET, DELAYED RELEASE ORAL
Status: DISCONTINUED | OUTPATIENT
Start: 2019-07-11 | End: 2019-07-12 | Stop reason: HOSPADM

## 2019-07-11 RX ORDER — IPRATROPIUM BROMIDE AND ALBUTEROL SULFATE 2.5; .5 MG/3ML; MG/3ML
3 SOLUTION RESPIRATORY (INHALATION) ONCE AS NEEDED
Status: DISCONTINUED | OUTPATIENT
Start: 2019-07-11 | End: 2019-07-11 | Stop reason: HOSPADM

## 2019-07-11 RX ORDER — MIDAZOLAM HYDROCHLORIDE 1 MG/ML
1 INJECTION INTRAMUSCULAR; INTRAVENOUS
Status: DISCONTINUED | OUTPATIENT
Start: 2019-07-11 | End: 2019-07-11 | Stop reason: HOSPADM

## 2019-07-11 RX ORDER — PROPOFOL 10 MG/ML
VIAL (ML) INTRAVENOUS AS NEEDED
Status: DISCONTINUED | OUTPATIENT
Start: 2019-07-11 | End: 2019-07-11 | Stop reason: SURG

## 2019-07-11 RX ORDER — ONDANSETRON 4 MG/1
4 TABLET, FILM COATED ORAL EVERY 6 HOURS PRN
Status: DISCONTINUED | OUTPATIENT
Start: 2019-07-11 | End: 2019-07-12 | Stop reason: HOSPADM

## 2019-07-11 RX ORDER — TAMSULOSIN HYDROCHLORIDE 0.4 MG/1
0.4 CAPSULE ORAL NIGHTLY
Status: DISCONTINUED | OUTPATIENT
Start: 2019-07-11 | End: 2019-07-12 | Stop reason: HOSPADM

## 2019-07-11 RX ORDER — OXYCODONE AND ACETAMINOPHEN 7.5; 325 MG/1; MG/1
2 TABLET ORAL EVERY 4 HOURS PRN
Status: DISCONTINUED | OUTPATIENT
Start: 2019-07-11 | End: 2019-07-12 | Stop reason: HOSPADM

## 2019-07-11 RX ORDER — NALOXONE HCL 0.4 MG/ML
0.4 VIAL (ML) INJECTION
Status: DISCONTINUED | OUTPATIENT
Start: 2019-07-11 | End: 2019-07-12 | Stop reason: HOSPADM

## 2019-07-11 RX ORDER — CEFAZOLIN SODIUM 2 G/50ML
2 SOLUTION INTRAVENOUS ONCE
Status: DISCONTINUED | OUTPATIENT
Start: 2019-07-11 | End: 2019-07-11

## 2019-07-11 RX ORDER — ROCURONIUM BROMIDE 10 MG/ML
INJECTION, SOLUTION INTRAVENOUS AS NEEDED
Status: DISCONTINUED | OUTPATIENT
Start: 2019-07-11 | End: 2019-07-11 | Stop reason: SURG

## 2019-07-11 RX ORDER — SODIUM CHLORIDE, SODIUM LACTATE, POTASSIUM CHLORIDE, CALCIUM CHLORIDE 600; 310; 30; 20 MG/100ML; MG/100ML; MG/100ML; MG/100ML
100 INJECTION, SOLUTION INTRAVENOUS CONTINUOUS
Status: DISCONTINUED | OUTPATIENT
Start: 2019-07-11 | End: 2019-07-12 | Stop reason: HOSPADM

## 2019-07-11 RX ORDER — FENTANYL CITRATE 50 UG/ML
INJECTION, SOLUTION INTRAMUSCULAR; INTRAVENOUS AS NEEDED
Status: DISCONTINUED | OUTPATIENT
Start: 2019-07-11 | End: 2019-07-11 | Stop reason: SURG

## 2019-07-11 RX ORDER — DICYCLOMINE HCL 20 MG
20 TABLET ORAL 2 TIMES DAILY
Status: CANCELLED | OUTPATIENT
Start: 2019-07-11

## 2019-07-11 RX ORDER — LIDOCAINE HYDROCHLORIDE 20 MG/ML
INJECTION, SOLUTION INFILTRATION; PERINEURAL AS NEEDED
Status: DISCONTINUED | OUTPATIENT
Start: 2019-07-11 | End: 2019-07-11 | Stop reason: SURG

## 2019-07-11 RX ORDER — PREGABALIN 75 MG/1
150 CAPSULE ORAL 2 TIMES DAILY
Status: CANCELLED | OUTPATIENT
Start: 2019-07-11

## 2019-07-11 RX ORDER — ROPIVACAINE HYDROCHLORIDE 5 MG/ML
INJECTION, SOLUTION EPIDURAL; INFILTRATION; PERINEURAL
Status: COMPLETED | OUTPATIENT
Start: 2019-07-11 | End: 2019-07-11

## 2019-07-11 RX ORDER — PREGABALIN 75 MG/1
150 CAPSULE ORAL 2 TIMES DAILY
Status: DISCONTINUED | OUTPATIENT
Start: 2019-07-11 | End: 2019-07-12 | Stop reason: HOSPADM

## 2019-07-11 RX ORDER — FERROUS SULFATE 325(65) MG
325 TABLET ORAL
Status: CANCELLED | OUTPATIENT
Start: 2019-07-11

## 2019-07-11 RX ORDER — FENTANYL CITRATE 50 UG/ML
50 INJECTION, SOLUTION INTRAMUSCULAR; INTRAVENOUS
Status: DISCONTINUED | OUTPATIENT
Start: 2019-07-11 | End: 2019-07-11 | Stop reason: HOSPADM

## 2019-07-11 RX ORDER — GLYCOPYRROLATE 0.2 MG/ML
INJECTION INTRAMUSCULAR; INTRAVENOUS AS NEEDED
Status: DISCONTINUED | OUTPATIENT
Start: 2019-07-11 | End: 2019-07-11 | Stop reason: SURG

## 2019-07-11 RX ORDER — SODIUM CHLORIDE 0.9 % (FLUSH) 0.9 %
10 SYRINGE (ML) INJECTION AS NEEDED
Status: DISCONTINUED | OUTPATIENT
Start: 2019-07-11 | End: 2019-07-12 | Stop reason: HOSPADM

## 2019-07-11 RX ORDER — FAMOTIDINE 10 MG/ML
INJECTION, SOLUTION INTRAVENOUS AS NEEDED
Status: DISCONTINUED | OUTPATIENT
Start: 2019-07-11 | End: 2019-07-11 | Stop reason: SURG

## 2019-07-11 RX ORDER — FERROUS SULFATE 325(65) MG
325 TABLET ORAL
Status: DISCONTINUED | OUTPATIENT
Start: 2019-07-12 | End: 2019-07-12 | Stop reason: HOSPADM

## 2019-07-11 RX ORDER — ROPINIROLE 0.25 MG/1
0.25 TABLET, FILM COATED ORAL 2 TIMES DAILY
Status: DISCONTINUED | OUTPATIENT
Start: 2019-07-11 | End: 2019-07-12 | Stop reason: HOSPADM

## 2019-07-11 RX ORDER — PANTOPRAZOLE SODIUM 40 MG/1
40 TABLET, DELAYED RELEASE ORAL
Status: CANCELLED | OUTPATIENT
Start: 2019-07-11

## 2019-07-11 RX ORDER — HYDROMORPHONE HYDROCHLORIDE 1 MG/ML
0.5 INJECTION, SOLUTION INTRAMUSCULAR; INTRAVENOUS; SUBCUTANEOUS
Status: DISCONTINUED | OUTPATIENT
Start: 2019-07-11 | End: 2019-07-12 | Stop reason: HOSPADM

## 2019-07-11 RX ORDER — HYDROMORPHONE HYDROCHLORIDE 1 MG/ML
0.5 INJECTION, SOLUTION INTRAMUSCULAR; INTRAVENOUS; SUBCUTANEOUS ONCE
Status: COMPLETED | OUTPATIENT
Start: 2019-07-11 | End: 2019-07-11

## 2019-07-11 RX ORDER — OXYCODONE HYDROCHLORIDE AND ACETAMINOPHEN 5; 325 MG/1; MG/1
1-2 TABLET ORAL EVERY 4 HOURS PRN
Qty: 20 TABLET | Refills: 0 | Status: ON HOLD | OUTPATIENT
Start: 2019-07-11 | End: 2019-08-13

## 2019-07-11 RX ORDER — MEPERIDINE HYDROCHLORIDE 25 MG/ML
12.5 INJECTION INTRAMUSCULAR; INTRAVENOUS; SUBCUTANEOUS
Status: DISCONTINUED | OUTPATIENT
Start: 2019-07-11 | End: 2019-07-11 | Stop reason: HOSPADM

## 2019-07-11 RX ORDER — ONDANSETRON 2 MG/ML
4 INJECTION INTRAMUSCULAR; INTRAVENOUS AS NEEDED
Status: DISCONTINUED | OUTPATIENT
Start: 2019-07-11 | End: 2019-07-11 | Stop reason: HOSPADM

## 2019-07-11 RX ORDER — DICYCLOMINE HCL 20 MG
20 TABLET ORAL 2 TIMES DAILY
Status: DISCONTINUED | OUTPATIENT
Start: 2019-07-11 | End: 2019-07-12 | Stop reason: HOSPADM

## 2019-07-11 RX ORDER — SODIUM CHLORIDE, SODIUM LACTATE, POTASSIUM CHLORIDE, CALCIUM CHLORIDE 600; 310; 30; 20 MG/100ML; MG/100ML; MG/100ML; MG/100ML
125 INJECTION, SOLUTION INTRAVENOUS CONTINUOUS
Status: DISCONTINUED | OUTPATIENT
Start: 2019-07-11 | End: 2019-07-11 | Stop reason: HOSPADM

## 2019-07-11 RX ORDER — ONDANSETRON 2 MG/ML
4 INJECTION INTRAMUSCULAR; INTRAVENOUS ONCE
Status: COMPLETED | OUTPATIENT
Start: 2019-07-11 | End: 2019-07-11

## 2019-07-11 RX ORDER — ONDANSETRON 2 MG/ML
INJECTION INTRAMUSCULAR; INTRAVENOUS AS NEEDED
Status: DISCONTINUED | OUTPATIENT
Start: 2019-07-11 | End: 2019-07-11 | Stop reason: SURG

## 2019-07-11 RX ADMIN — LIDOCAINE HYDROCHLORIDE 60 MG: 20 INJECTION, SOLUTION INFILTRATION; PERINEURAL at 07:35

## 2019-07-11 RX ADMIN — SODIUM CHLORIDE, POTASSIUM CHLORIDE, SODIUM LACTATE AND CALCIUM CHLORIDE: 600; 310; 30; 20 INJECTION, SOLUTION INTRAVENOUS at 08:50

## 2019-07-11 RX ADMIN — PANTOPRAZOLE SODIUM 40 MG: 40 TABLET, DELAYED RELEASE ORAL at 21:44

## 2019-07-11 RX ADMIN — GLYCOPYRROLATE 0.2 MG: 0.2 INJECTION, SOLUTION INTRAMUSCULAR; INTRAVENOUS at 07:45

## 2019-07-11 RX ADMIN — ONDANSETRON 4 MG: 2 INJECTION, SOLUTION INTRAMUSCULAR; INTRAVENOUS at 07:40

## 2019-07-11 RX ADMIN — HYDROMORPHONE HYDROCHLORIDE 0.5 MG: 1 INJECTION, SOLUTION INTRAMUSCULAR; INTRAVENOUS; SUBCUTANEOUS at 18:28

## 2019-07-11 RX ADMIN — PROPOFOL 150 MG: 10 INJECTION, EMULSION INTRAVENOUS at 07:40

## 2019-07-11 RX ADMIN — ROPIVACAINE HYDROCHLORIDE 20 MG: 5 INJECTION, SOLUTION EPIDURAL; INFILTRATION; PERINEURAL at 07:29

## 2019-07-11 RX ADMIN — HYDROMORPHONE HYDROCHLORIDE 0.5 MG: 1 INJECTION, SOLUTION INTRAMUSCULAR; INTRAVENOUS; SUBCUTANEOUS at 23:20

## 2019-07-11 RX ADMIN — MIRTAZAPINE 15 MG: 15 TABLET, FILM COATED ORAL at 21:44

## 2019-07-11 RX ADMIN — ONDANSETRON 4 MG: 2 INJECTION, SOLUTION INTRAMUSCULAR; INTRAVENOUS at 18:27

## 2019-07-11 RX ADMIN — OXYCODONE HYDROCHLORIDE AND ACETAMINOPHEN 2 TABLET: 7.5; 325 TABLET ORAL at 19:39

## 2019-07-11 RX ADMIN — ROCURONIUM BROMIDE 25 MG: 10 INJECTION INTRAVENOUS at 07:40

## 2019-07-11 RX ADMIN — EPHEDRINE SULFATE 10 MG: 50 INJECTION INTRAMUSCULAR; INTRAVENOUS; SUBCUTANEOUS at 09:34

## 2019-07-11 RX ADMIN — FAMOTIDINE 20 MG: 10 INJECTION, SOLUTION INTRAVENOUS at 07:35

## 2019-07-11 RX ADMIN — GLYCOPYRROLATE 0.2 MG: 0.2 INJECTION, SOLUTION INTRAMUSCULAR; INTRAVENOUS at 09:13

## 2019-07-11 RX ADMIN — CEFAZOLIN 2 G: 1 INJECTION, POWDER, FOR SOLUTION INTRAMUSCULAR; INTRAVENOUS; PARENTERAL at 07:35

## 2019-07-11 RX ADMIN — SODIUM CHLORIDE, POTASSIUM CHLORIDE, SODIUM LACTATE AND CALCIUM CHLORIDE 100 ML/HR: 600; 310; 30; 20 INJECTION, SOLUTION INTRAVENOUS at 19:39

## 2019-07-11 RX ADMIN — EPHEDRINE SULFATE 10 MG: 50 INJECTION INTRAMUSCULAR; INTRAVENOUS; SUBCUTANEOUS at 09:31

## 2019-07-11 RX ADMIN — HYDROMORPHONE HYDROCHLORIDE 0.5 MG: 1 INJECTION, SOLUTION INTRAMUSCULAR; INTRAVENOUS; SUBCUTANEOUS at 20:56

## 2019-07-11 RX ADMIN — FENTANYL CITRATE 50 MCG: 50 INJECTION INTRAMUSCULAR; INTRAVENOUS at 09:11

## 2019-07-11 RX ADMIN — PREGABALIN 150 MG: 75 CAPSULE ORAL at 21:44

## 2019-07-11 RX ADMIN — ROPINIROLE HYDROCHLORIDE 0.25 MG: 0.25 TABLET, FILM COATED ORAL at 20:57

## 2019-07-11 RX ADMIN — SODIUM CHLORIDE, POTASSIUM CHLORIDE, SODIUM LACTATE AND CALCIUM CHLORIDE 125 ML/HR: 600; 310; 30; 20 INJECTION, SOLUTION INTRAVENOUS at 07:32

## 2019-07-11 RX ADMIN — EPHEDRINE SULFATE 10 MG: 50 INJECTION INTRAMUSCULAR; INTRAVENOUS; SUBCUTANEOUS at 07:47

## 2019-07-11 RX ADMIN — FENTANYL CITRATE 50 MCG: 50 INJECTION INTRAMUSCULAR; INTRAVENOUS at 09:57

## 2019-07-11 RX ADMIN — MIDAZOLAM HYDROCHLORIDE 2 MG: 1 INJECTION, SOLUTION INTRAMUSCULAR; INTRAVENOUS at 07:25

## 2019-07-11 RX ADMIN — FENTANYL CITRATE 100 MCG: 50 INJECTION INTRAMUSCULAR; INTRAVENOUS at 07:25

## 2019-07-11 RX ADMIN — TAMSULOSIN HYDROCHLORIDE 0.4 MG: 0.4 CAPSULE ORAL at 20:57

## 2019-07-11 RX ADMIN — EPHEDRINE SULFATE 10 MG: 50 INJECTION INTRAMUSCULAR; INTRAVENOUS; SUBCUTANEOUS at 07:43

## 2019-07-11 RX ADMIN — EPHEDRINE SULFATE 10 MG: 50 INJECTION INTRAMUSCULAR; INTRAVENOUS; SUBCUTANEOUS at 07:50

## 2019-07-11 RX ADMIN — FENTANYL CITRATE 50 MCG: 50 INJECTION INTRAMUSCULAR; INTRAVENOUS at 10:47

## 2019-07-11 RX ADMIN — DICYCLOMINE HYDROCHLORIDE 20 MG: 20 TABLET ORAL at 20:57

## 2019-07-11 RX ADMIN — HYDROMORPHONE HYDROCHLORIDE 0.5 MG: 1 INJECTION, SOLUTION INTRAMUSCULAR; INTRAVENOUS; SUBCUTANEOUS at 18:56

## 2019-07-11 NOTE — ED PROVIDER NOTES
Subjective   Patient is a 73-year-old male who had a left rotator cuff repair with Dr. Berry this morning.  He was advised to stay overnight for observation for pain management, but felt that he would do well at home.  He now presents complaining of excruciating pain in his left shoulder unrelieved at home by his Percocet.  He denies any other symptoms or complaints.            Review of Systems   Constitutional: Negative for chills, diaphoresis and fever.   HENT: Negative for ear pain, sore throat and trouble swallowing.    Eyes: Negative for photophobia and pain.   Respiratory: Negative for shortness of breath, wheezing and stridor.    Cardiovascular: Negative for chest pain and palpitations.   Gastrointestinal: Negative for abdominal distention, abdominal pain, blood in stool, diarrhea, nausea and vomiting.   Endocrine: Negative for polydipsia and polyphagia.   Genitourinary: Negative for difficulty urinating and flank pain.   Musculoskeletal: Negative for back pain, neck pain and neck stiffness.   Skin: Negative for color change and pallor.   Neurological: Negative for seizures, syncope and speech difficulty.   Psychiatric/Behavioral: Negative for confusion.   All other systems reviewed and are negative.      Past Medical History:   Diagnosis Date   • Anemia    • Anxiety    • Arthritis    • BPH (benign prostatic hyperplasia)    • CAD (coronary artery disease)    • Cheekbone fracture (CMS/HCC)     surgical repair    • Diabetes mellitus (CMS/[facility])    • GERD (gastroesophageal reflux disease)    • H/O foot surgery     left foot orif   • History of hernia surgery     right inguinal   • Pacemaker    • PAF (paroxysmal atrial fibrillation) (CMS/[facility])    • Rotator cuff injury     bilat   • Varicose vein of leg     left lower leg       No Known Allergies    Past Surgical History:   Procedure Laterality Date   • APPENDECTOMY     • CARDIAC CATHETERIZATION     • CARDIAC SURGERY      pacemaker   • CARDIOVASCULAR STRESS TEST   04/21/2014    Dr SANDRA GÓMEZ 58%, pos for ischemia    • CARDIOVASCULAR STRESS TEST  06/26/2018    [facility]- nuclear stress test reported as normal. LVEF 62%   • COLONOSCOPY     • CONVERTED (HISTORICAL) HOLTER  03/04/2015    16 beat run of PAF baseline sinus dago at 50, no symptoms recorded   • CONVERTED (HISTORICAL) HOLTER  07/31/2018    72 hour- baseline sinus- minium 37 bpm and max 96 bpm, 36 pauses noted longest 2.3 sec, 4 beat SVT   • ECHO - CONVERTED  04/16/2014    heart and vascular Dr SANDRA GÓMEZ 65%, mild AR   • ENDOSCOPY     • FOOT SURGERY     • HERNIA REPAIR     • OTHER SURGICAL HISTORY  2015    Ecardio- sinus dago, continue observation   • PACEMAKER IMPLANTATION     • SHOULDER ARTHROSCOPY W/ ROTATOR CUFF REPAIR Right 3/19/2019    Procedure: SHOULDER ARTHROSCOPY WITH  OPEN ROTATOR CUFF REPAIR, ACOMIOPLASTY;  Surgeon: Tylor Berry MD;  Location: [facility];  Service: Orthopedics       Family History   Problem Relation Age of Onset   • Lung disease Mother    • Cancer Sister    • Cancer Brother        Social History     Socioeconomic History   • Marital status:      Spouse name: Not on file   • Number of children: Not on file   • Years of education: Not on file   • Highest education level: Not on file   Tobacco Use   • Smoking status: Never Smoker   • Smokeless tobacco: Current User     Types: Chew   • Tobacco comment: patient counseled on effectsof tobacco use on health.    Substance and Sexual Activity   • Alcohol use: No   • Drug use: No   • Sexual activity: Defer     Partners: Female     Birth control/protection: None           Objective   Physical Exam   Constitutional: He is oriented to person, place, and time. He appears well-developed and well-nourished.   Patient appears to be in pain.   HENT:   Head: Normocephalic and atraumatic.   Eyes: EOM are normal. Pupils are equal, round, and reactive to light. No scleral icterus.   Neck: Normal range of motion. Neck supple. No neck rigidity. No  tracheal deviation present.   Cardiovascular: Normal rate, regular rhythm and intact distal pulses.   Pulmonary/Chest: Effort normal and breath sounds normal. No respiratory distress. He exhibits no tenderness.   Abdominal: Soft. Bowel sounds are normal. There is no tenderness. There is no rebound and no guarding.   Musculoskeletal: He exhibits no edema or deformity.   Neurological: He is alert and oriented to person, place, and time. He has normal strength. No sensory deficit. He exhibits normal muscle tone. Coordination normal. GCS eye subscore is 4. GCS verbal subscore is 5. GCS motor subscore is 6.   Skin: Skin is warm and dry. Capillary refill takes less than 2 seconds. He is not diaphoretic. No cyanosis. No pallor.   Psychiatric: He has a normal mood and affect. His behavior is normal.   Nursing note and vitals reviewed.      Procedures           ED Course  ED Course as of Jul 11 1917   Thu Jul 11, 2019   1820 Case discussed with Dr. Berry.  He is admitting patient to the hospital for overnight observation.  [CM]      ED Course User Index  [CM] Jd Shelley MD                  [facility]      Final diagnoses:   Acute postoperative pain of left shoulder             Please note that portions of this note were completed with a voice recognition program. Efforts were made to edit the dictations, but occasionally words are mistranscribed.       Jd Shelley MD  07/11/19 1917

## 2019-07-11 NOTE — OP NOTE
SHOULDER ARTHROSCOPY WITH MINI OPEN ROTATOR CUFF REPAIR  Procedure Note    Pavel Claudio  7/11/2019    Pre-op Diagnosis:   Rotator cuff tear left shoulder    Post-op Diagnosis:     Post-Op Diagnosis Codes:  Full-thickness rotator cuff tear left shoulder with retraction      Procedure(s):  LEFT SHOULDER ARTHROSCOPY WITH MINI OPEN ROTATOR CUFF REPAIR, ACROMIOPLASTY    Surgeon(s):  Tylor Berry MD    Anesthesia: General/interscalene block    Operative technique: With patient in the operating theatre under general anesthetic with an inter bleeding controlled wound was irrigated the deltoid repaired back to bone with #2 Ethibond the figure-of-eight x2.  The deltoid was reapproximated 2-0 Monocryl running.  Wound was then closed in layers scalene block administered in the preoperative area was positioned in chair with pressure points padded blood pressure closely monitored.  Left shoulder and arm were sterilely prepped and draped in the usual manner.  Arthroscope was introduced to the posterior viewing portal past intra-articular.  Biceps anchor biceps tendon intact.  Rotator cuff tear full-thickness with retraction was evident.  Arthroscope was introduced to the subacromial space and again the rotator cuff tear confirmed.  The scope was removed.  Oblique incision was made off the anterolateral corner of the acromion carried through skin sharply.  Interval between anterior middle thirds of the deltoid was split and reflected off the anterior aspect of the shoulder revealing large rotator cuff tear with retraction along with marked impingement.  With oscillating saw the anterior acromion was relieved then with high-speed bur the subacromial surface was contoured.  Tatar cuff tear was then addressed.  The tendon was grasped with Allis forceps and with Fraser elevator adhesions were broken down.  The tendon was retracted laterally.  A portion of the greater tuberosity was decorticated.  None of the rotator cuff  tendon posteriorly was left intact.  Using #5 Ethibond passed to the lateral cortex it was through the cancellus trough created past 2 rotator cuff tendon and then passed again to the tendon 2 cm posteriorly brought to the cancellus trough and then brought lateral cortex.  This was repeated x3 in the rotator cuff tendon to the lateral aspect of the tuberosity.  It also created a seal.  Eating edge of the rotator cuff tendon was reapproximated to the remaining stump posteriorly with #2 Ethibond and the lateral cortex of the tuberosity.  Was copiously irrigated.  Bleeding lateral aspect of the shoulder inferiorly branch of axillary artery was controlled with Surgicel.  Deltoid was repaired back to bone with #2 Ethibond in a figure-of-eight x2.  Deltoid was repaired with running 2-0 Monocryl.  Wound was closed in layers with staples for final closure.  With sterile dressing applied sling and swath applied he was taken to recovery room in stable condition.    Staff:   Circulator: Adam Loyd RN  Scrub Person: Amy Perez  Assistant: Eliud Cee    Estimated Blood Loss: 100 mL    Specimens:   none             * No orders in the log *    Implants/Grafts: none      Drains: None      Complications: none    Tourniquet time:   min    Tylor Berry MD     Date: 7/11/2019  Time: 10:30 AM    Cc: Jacquelin Turk APRN

## 2019-07-11 NOTE — ANESTHESIA PROCEDURE NOTES
Airway  Urgency: elective    Date/Time: 7/11/2019 7:40 AM  Airway not difficult    General Information and Staff    Patient location during procedure: OR  Anesthesiologist: Yovanny Booker MD  CRNA: Liseth Strickland CRNA    Indications and Patient Condition  Indications for airway management: airway protection    Preoxygenated: yes  MILS maintained throughout  Mask difficulty assessment: 2 - vent by mask + OA or adjuvant +/- NMBA    Final Airway Details  Final airway type: endotracheal airway      Successful airway: ETT  Cuffed: yes   Successful intubation technique: direct laryngoscopy  Endotracheal tube insertion site: oral  Blade: Leonid  Blade size: 3  ETT size (mm): 7.5  Cormack-Lehane Classification: grade IIa - partial view of glottis  Placement verified by: chest auscultation, capnometry and palpation of cuff   Cuff volume (mL): 6  Measured from: lips  ETT to lips (cm): 22  Number of attempts at approach: 1    Additional Comments  Dentition as preop. No complications noted. Patient tolerated well.  ETT secured.

## 2019-07-11 NOTE — ED NOTES
Patient has left rotater cuff shoulder this morning. Patient states that his pain is worse. Patient states he can not get any relief. He denies any injury      Danny Norwood RN  07/11/19 1815

## 2019-07-11 NOTE — ANESTHESIA POSTPROCEDURE EVALUATION
Patient: Pavel Claudio    Procedure Summary     Date:  07/11/19 Room / Location:  [facility] OR  /  [facility] OR    Anesthesia Start:  0733 Anesthesia Stop:  1022    Procedure:  SHOULDER ARTHROSCOPY WITH MINI OPEN ROTATOR CUFF REPAIR, ACROMIOPLASTY (Left Shoulder) Diagnosis:       Incomplete tear of left rotator cuff      Acute pain of left shoulder      (Incomplete tear of left rotator cuff [M75.112])      (Acute pain of left shoulder [M25.512])    Surgeon:  Tylor Berry MD Provider:  Yovanny Booker MD    Anesthesia Type:  general ASA Status:  3          Anesthesia Type: general  Last vitals  BP   139/73 (07/11/19 1058)   Temp   97.2 °F (36.2 °C) (07/11/19 1023)   Pulse   63 (07/11/19 1058)   Resp   12 (07/11/19 1058)     SpO2   95 % (07/11/19 1058)     Post Anesthesia Care and Evaluation    Patient location during evaluation: PHASE II  Patient participation: complete - patient participated  Level of consciousness: awake and alert  Pain score: 1  Pain management: adequate  Airway patency: patent  Anesthetic complications: No anesthetic complications  PONV Status: controlled  Cardiovascular status: acceptable  Respiratory status: acceptable  Hydration status: acceptable

## 2019-07-11 NOTE — ANESTHESIA PROCEDURE NOTES
Peripheral Block    Pre-sedation assessment completed: 7/11/2019 7:25 AM    Patient location during procedure: holding area  Start time: 7/11/2019 7:20 AM  Stop time: 7/11/2019 7:29 AM  Reason for block: at surgeon's request and post-op pain management  Performed by  Anesthesiologist: Yovanny Booker MD  Preanesthetic Checklist  Completed: patient identified, site marked, surgical consent, pre-op evaluation, timeout performed, IV checked, risks and benefits discussed and monitors and equipment checked  Prep:  Pt Position: supine  Sterile barriers:cap, gloves, mask and sterile barriers  Prep: ChloraPrep  Patient monitoring: blood pressure monitoring, continuous pulse oximetry and EKG  Procedure  Sedation:yes    Guidance:ultrasound guided  ULTRASOUND INTERPRETATION. Using ultrasound guidance a 20 G gauge needle was placed in close proximity to the nerve, at which point, under ultrasound guidance anesthetic was injected in the area of the nerve and spread of the anesthesia was seen on ultrasound in close proximity thereto.  There were no abnormalities seen on ultrasound; a digital image was taken; and the patient tolerated the procedure with no complications. Images:still images not obtained  Loss of twitch: 0.6 mA  Laterality:left  Block Type:supraclavicular  Injection Technique:single-shot  Needle Type:Tuohy  Needle Gauge:20 G  Resistance on Injection: less than 15 psi  Catheter Size:20 G (20g)    Medications Used: ropivacaine (NAROPIN) injection 0.5 %, 20 mg      Post Assessment  Injection Assessment: negative aspiration for heme, no paresthesia on injection and incremental injection  Patient Tolerance:comfortable throughout block  Complications:no

## 2019-07-12 VITALS
DIASTOLIC BLOOD PRESSURE: 65 MMHG | HEIGHT: 70 IN | BODY MASS INDEX: 24.24 KG/M2 | OXYGEN SATURATION: 96 % | WEIGHT: 169.3 LBS | SYSTOLIC BLOOD PRESSURE: 148 MMHG | RESPIRATION RATE: 18 BRPM | TEMPERATURE: 98 F | HEART RATE: 58 BPM

## 2019-07-12 LAB — GLUCOSE BLDC GLUCOMTR-MCNC: 137 MG/DL (ref 70–130)

## 2019-07-12 PROCEDURE — 94799 UNLISTED PULMONARY SVC/PX: CPT

## 2019-07-12 PROCEDURE — 96361 HYDRATE IV INFUSION ADD-ON: CPT

## 2019-07-12 PROCEDURE — 82962 GLUCOSE BLOOD TEST: CPT

## 2019-07-12 PROCEDURE — 25010000002 HYDROMORPHONE PER 4 MG: Performed by: ORTHOPAEDIC SURGERY

## 2019-07-12 PROCEDURE — G0378 HOSPITAL OBSERVATION PER HR: HCPCS

## 2019-07-12 PROCEDURE — 99024 POSTOP FOLLOW-UP VISIT: CPT | Performed by: PHYSICIAN ASSISTANT

## 2019-07-12 PROCEDURE — 96376 TX/PRO/DX INJ SAME DRUG ADON: CPT

## 2019-07-12 RX ADMIN — METFORMIN HYDROCHLORIDE 500 MG: 500 TABLET ORAL at 08:07

## 2019-07-12 RX ADMIN — OXYCODONE HYDROCHLORIDE AND ACETAMINOPHEN 2 TABLET: 7.5; 325 TABLET ORAL at 00:48

## 2019-07-12 RX ADMIN — PANTOPRAZOLE SODIUM 40 MG: 40 TABLET, DELAYED RELEASE ORAL at 06:07

## 2019-07-12 RX ADMIN — OXYCODONE HYDROCHLORIDE AND ACETAMINOPHEN 2 TABLET: 7.5; 325 TABLET ORAL at 09:22

## 2019-07-12 RX ADMIN — OXYCODONE HYDROCHLORIDE AND ACETAMINOPHEN 2 TABLET: 7.5; 325 TABLET ORAL at 04:49

## 2019-07-12 RX ADMIN — HYDROMORPHONE HYDROCHLORIDE 0.5 MG: 1 INJECTION, SOLUTION INTRAMUSCULAR; INTRAVENOUS; SUBCUTANEOUS at 08:07

## 2019-07-12 RX ADMIN — HYDROMORPHONE HYDROCHLORIDE 0.5 MG: 1 INJECTION, SOLUTION INTRAMUSCULAR; INTRAVENOUS; SUBCUTANEOUS at 06:06

## 2019-07-12 RX ADMIN — PREGABALIN 150 MG: 75 CAPSULE ORAL at 08:07

## 2019-07-12 RX ADMIN — FERROUS SULFATE TAB 325 MG (65 MG ELEMENTAL FE) 325 MG: 325 (65 FE) TAB at 08:07

## 2019-07-12 RX ADMIN — HYDROMORPHONE HYDROCHLORIDE 0.5 MG: 1 INJECTION, SOLUTION INTRAMUSCULAR; INTRAVENOUS; SUBCUTANEOUS at 01:48

## 2019-07-12 RX ADMIN — ROPINIROLE HYDROCHLORIDE 0.25 MG: 0.25 TABLET, FILM COATED ORAL at 08:07

## 2019-07-12 RX ADMIN — DICYCLOMINE HYDROCHLORIDE 20 MG: 20 TABLET ORAL at 08:07

## 2019-07-12 RX ADMIN — SODIUM CHLORIDE, POTASSIUM CHLORIDE, SODIUM LACTATE AND CALCIUM CHLORIDE 100 ML/HR: 600; 310; 30; 20 INJECTION, SOLUTION INTRAVENOUS at 06:06

## 2019-07-12 RX ADMIN — HYDROMORPHONE HYDROCHLORIDE 0.5 MG: 1 INJECTION, SOLUTION INTRAMUSCULAR; INTRAVENOUS; SUBCUTANEOUS at 03:43

## 2019-07-12 RX ADMIN — HYDROMORPHONE HYDROCHLORIDE 0.5 MG: 1 INJECTION, SOLUTION INTRAMUSCULAR; INTRAVENOUS; SUBCUTANEOUS at 10:49

## 2019-07-12 NOTE — PROGRESS NOTES
Discharge Planning Assessment   Diomedes     Patient Name: Pavel Claudio  MRN: [id number]  Today's Date: 7/12/2019    Admit Date: 7/11/2019    Discharge Needs Assessment    No documentation.       Discharge Plan     Row Name 07/12/19 1428       Plan    Final Discharge Disposition Code  01 - home or self-care    Final Note  Patient discharged home on 7-12-19.  No needs identified.          Destination      No service coordination in this encounter.      Durable Medical Equipment      No service coordination in this encounter.      Dialysis/Infusion      No service coordination in this encounter.      Home Medical Care      No service coordination in this encounter.      Therapy      No service coordination in this encounter.      Community Resources      No service coordination in this encounter.        Expected Discharge Date and Time     Expected Discharge Date Expected Discharge Time    Jul 12, 2019         Demographic Summary    No documentation.       Functional Status    No documentation.       Psychosocial    No documentation.       Abuse/Neglect    No documentation.       Legal    No documentation.       Substance Abuse    No documentation.       Patient Forms    No documentation.           Bebe Gould RN

## 2019-07-12 NOTE — PLAN OF CARE
Problem: Patient Care Overview  Goal: Plan of Care Review  Outcome: Ongoing (interventions implemented as appropriate)   07/11/19 2210   Coping/Psychosocial   Plan of Care Reviewed With patient   Plan of Care Review   Progress no change       Problem: Pain, Acute (Adult)  Goal: Identify Related Risk Factors and Signs and Symptoms  Outcome: Outcome(s) achieved Date Met: 07/11/19 07/11/19 2210   Pain, Acute (Adult)   Related Risk Factors (Acute Pain) surgery   Signs and Symptoms (Acute Pain) verbalization of pain descriptors     Goal: Acceptable Pain Control/Comfort Level  Outcome: Ongoing (interventions implemented as appropriate)   07/11/19 2210   Pain, Acute (Adult)   Acceptable Pain Control/Comfort Level making progress toward outcome       Problem: Skin Injury Risk (Adult)  Goal: Identify Related Risk Factors and Signs and Symptoms  Outcome: Outcome(s) achieved Date Met: 07/11/19 07/11/19 2210   Skin Injury Risk (Adult)   Related Risk Factors (Skin Injury Risk) advanced age;mobility impaired     Goal: Skin Health and Integrity  Outcome: Ongoing (interventions implemented as appropriate)   07/11/19 2210   Skin Injury Risk (Adult)   Skin Health and Integrity making progress toward outcome

## 2019-07-12 NOTE — PROGRESS NOTES
Inpatient Progress Note  Pavel Claudio  Date: 07/12/19  MRN: [id number]      Subjective:  Pavel Claudio is a 73 y.o. male POD#1 left shoulder arthroscopy with acromioplasty and mini open repair of the rotator cuff tendon.  He sitting up in hospital bed this morning with sling and swath in place.  He complains of moderate pain but states it is eased with pain medication.  He denies paresthesias.  He has no new complaints this morning.    Objective:    Vitals:    07/11/19 2225 07/12/19 0300 07/12/19 0510 07/12/19 0619   BP: 134/74 151/72  154/66   BP Location: Right arm Right arm  Right arm   Patient Position: Lying Lying  Lying   Pulse: 54 70  58   Resp: 18 18  18   Temp: 98 °F (36.7 °C) 97.8 °F (36.6 °C)  98.1 °F (36.7 °C)   TempSrc: Oral Oral  Oral   SpO2: 95% 96% 96%    Weight:       Height:         He is alert and oriented x3.  Examination of the left shoulder reveals Aquasol dressings intact.  He has mild swelling.  Sling and swath is in place.  He has good mobility of the wrist and digits.  His neurovascular status is intact.    Labs:    Results from last 7 days   Lab Units 07/11/19  1850 07/05/19  1102   WBC 10*3/mm3 13.35* 6.75   HEMOGLOBIN g/dL 12.3* 13.5   HEMATOCRIT % 38.2 42.2   MCV fL 91.8 90.4   MCHC g/dL 32.2 32.0   PLATELETS 10*3/mm3 200 241         Results from last 7 days   Lab Units 07/11/19  1850 07/05/19  1102   SODIUM mmol/L 138 142   POTASSIUM mmol/L 3.9 4.1   CHLORIDE mmol/L 105 108*   CO2 mmol/L 19.2* 22.3   BUN mg/dL 14 19   CREATININE mg/dL 0.91 1.20   EGFR IF NONAFRICN AM mL/min/1.73 82 59*   CALCIUM mg/dL 8.7 9.5   GLUCOSE mg/dL 270* 150*   ALBUMIN g/dL 3.85  --    BILIRUBIN mg/dL 0.3  --    ALK PHOS U/L 91  --    AST (SGOT) U/L 20  --    ALT (SGPT) U/L 14  --    Estimated Creatinine Clearance: 78.5 mL/min (by C-G formula based on SCr of 0.91 mg/dL).  No results found for: AMMONIA  Results from last 7 days   Lab Units 07/11/19  1850   TROPONIN T ng/mL <0.010         No results found for:  HGBA1C  Lab Results   Component Value Date    TSH 1.490 09/21/2016         Pain Management Panel     There is no flowsheet data to display.              Radiology:  Imaging Results (last 72 hours)     ** No results found for the last 72 hours. **            Assessment:      ICD-10-CM ICD-9-CM   1. Acute postoperative pain of left shoulder G89.18 719.41    M25.512 338.18       Plan:  POD#1 left shoulder arthroscopy with mini open repair of the rotator cuff tendon and acromioplasty.  He is doing well.  Pain controlled.  Advised to continue sling and swath.  He is encouraged to remove it for gentle range of motion of the wrist and elbow.  We will discharge home and return to clinic in 1 week      MCKENNA Roberts

## 2019-07-12 NOTE — DISCHARGE SUMMARY
Pavel Claudio  1945  [id number]      Date of Discharge:  7/12/2019      Discharge Diagnosis:   Rotator cuff tear left shoulder    Procedures Performed  Left shoulder arthroscopy with mini open rotator cuff repair, acromioplasty         Hospital Course  Patient is a 73 y.o. male presented with left shoulder pain consistent with rotator cuff tear.  He therefore underwent left shoulder arthroscopy with mini open repair of the rotator cuff tendon and acromioplasty.  Intraoperatively was fairly uneventful.  He was initially discharged home but came back to the hospital with intractable pain.  He was subsequently admitted for observation and pain control.  On postoperative day 1 he states pain had decreased significantly and was being managed with p.o. pain medication.  His dressings were intact and sling and swath are in place.  The remainder of his stay was uneventful.  His vital signs are stable and he was afebrile throughout the stay.  He was deemed medically stable and discharged home on 7/12/2019.  He will continue with the sling and swath and return back to the clinic in 1 week for follow-up.    Consults:   Consults     Date and Time Order Name Status Description    7/11/2019 1823 Ortho (on-call MD unless specified)              Condition on Discharge:  Stable      Vital Signs  Vitals:    07/11/19 2225 07/12/19 0300 07/12/19 0510 07/12/19 0619   BP: 134/74 151/72  154/66   BP Location: Right arm Right arm  Right arm   Patient Position: Lying Lying  Lying   Pulse: 54 70  58   Resp: 18 18  18   Temp: 98 °F (36.7 °C) 97.8 °F (36.6 °C)  98.1 °F (36.7 °C)   TempSrc: Oral Oral  Oral   SpO2: 95% 96% 96%    Weight:       Height:             Discharge Disposition  Home or Self Care      Discharge Medications     Discharge Medications      Continue These Medications      Instructions Start Date   alfuzosin 10 MG 24 hr tablet  Commonly known as:  UROXATRAL   10 mg, Oral, Nightly      apixaban 5 MG tablet tablet  Commonly  known as:  ELIQUIS   5 mg, Oral, Every 12 Hours Scheduled      dicyclomine 20 MG tablet  Commonly known as:  BENTYL   20 mg, Oral, 2 Times Daily      ferrous sulfate 325 (65 FE) MG tablet   325 mg, Oral, Daily With Breakfast      lansoprazole 30 MG capsule  Commonly known as:  PREVACID   30 mg, Oral, 2 Times Daily      metFORMIN 500 MG tablet  Commonly known as:  GLUCOPHAGE   500 mg, Oral, Daily With Breakfast      mirtazapine 30 MG tablet  Commonly known as:  REMERON   15 mg, Oral, Nightly      oxyCODONE-acetaminophen 5-325 MG per tablet  Commonly known as:  PERCOCET   1-2 tablets, Oral, Every 4 Hours PRN      pregabalin 150 MG capsule  Commonly known as:  LYRICA   150 mg, Oral, 2 Times Daily      rOPINIRole 0.25 MG tablet  Commonly known as:  REQUIP   0.25 mg, Oral, 2 Times Daily, Take 1 hour before bedtime.               Follow-up Appointments  Future Appointments   Date Time Provider Department Center   7/17/2019  3:30 PM Tylor Berry MD [facility] None     Additional Instructions for the Follow-ups that You Need to Schedule     Discharge Follow-up with Specified Provider: Dr. Berry; 1 Week   As directed      To:  Dr. Berry    Follow Up:  1 Week

## 2019-07-12 NOTE — PROGRESS NOTES
Discharge Planning Assessment   Linch     Patient Name: Pavel Claudio  MRN: [id number]  Today's Date: 7/12/2019    Admit Date: 7/11/2019    Discharge Needs Assessment     Row Name 07/12/19 1000       Living Environment    Lives With  spouse    Name(s) of Who Lives With Patient  Pt lives with his wife, Yuly.     Current Living Arrangements  home/apartment/condo    Primary Care Provided by  self    Family Caregiver if Needed  spouse    Quality of Family Relationships  involved    Able to Return to Prior Arrangements  yes       Resource/Environmental Concerns    Transportation Concerns  car, none       Transition Planning    Patient/Family Anticipates Transition to  home with family    Patient/Family Anticipated Services at Transition  none    Transportation Anticipated  family or friend will provide       Discharge Needs Assessment    Concerns to be Addressed  no discharge needs identified    Equipment Currently Used at Home  none    Equipment Needed After Discharge  none        Discharge Plan     Row Name 07/12/19 1001       Plan    Plan  SS spoke with pt on this day. Pt lives with his wife, Yuly. Pt does not receive  services, or use any DME. Pt does not have a POA or a living will. Pt uses [facility], and his PCP is Siobhan Adrian. Pt plans to return home at discharge, with transportation provided by his wife. SS will follow and assist as needed.     Patient/Family in Agreement with Plan  yes          Expected Discharge Date and Time     Expected Discharge Date Expected Discharge Time    Jul 12, 2019             EDE Marrero

## 2019-07-17 ENCOUNTER — OFFICE VISIT (OUTPATIENT)
Dept: ORTHOPEDIC SURGERY | Facility: CLINIC | Age: 74
End: 2019-07-17

## 2019-07-17 VITALS — BODY MASS INDEX: 24.24 KG/M2 | HEIGHT: 70 IN | WEIGHT: 169.31 LBS

## 2019-07-17 DIAGNOSIS — M75.122 COMPLETE TEAR OF LEFT ROTATOR CUFF: ICD-10-CM

## 2019-07-17 DIAGNOSIS — Z09 POSTOP CHECK: Primary | ICD-10-CM

## 2019-07-17 DIAGNOSIS — Z98.890 S/P LEFT ROTATOR CUFF REPAIR: ICD-10-CM

## 2019-07-17 PROCEDURE — 99024 POSTOP FOLLOW-UP VISIT: CPT | Performed by: ORTHOPAEDIC SURGERY

## 2019-07-17 NOTE — PROGRESS NOTES
Follow-up Visit         Patient: Pavel Claudio  YOB: 1945  Date of Encounter: 07/17/2019      Chief  Complaint:   Chief Complaint   Patient presents with   • Left Shoulder - Post-op, Follow-up     07/11/19 (6d)   Tylor Berry MD     SHOULDER ARTHROSCOPY WITH MINI OPEN ROTATOR CUFF REPAIR, ACROMIOPLASTY - Left             HPI:  Pavel Claudio, 73 y.o. male turns in follow-up left shoulder arthroscopy open rotator cuff repair 6 days postop doing well other than some swelling about his arm and elbow.    Medical History:  Patient Active Problem List   Diagnosis   • SSS (sick sinus syndrome) (CMS/[facility])   • Diabetes mellitus type 2, controlled, without complications (CMS/HCC)   • Paroxysmal atrial fibrillation (CMS/HCC)   • Medication management   • Bradycardia   • Chewing tobacco use   • Left axis deviation   • Current use of long term anticoagulation   • Complete tear of right rotator cuff   • Osteoarthritis of right AC (acromioclavicular) joint   • Impingement syndrome of right shoulder   • Incomplete tear of left rotator cuff   • Acute pain of left shoulder   • Acute postoperative pain of left shoulder     Past Medical History:   Diagnosis Date   • Anemia    • Anxiety    • Arthritis    • BPH (benign prostatic hyperplasia)    • CAD (coronary artery disease)    • Cheekbone fracture (CMS/HCC)     surgical repair    • Diabetes mellitus (CMS/HCC)    • GERD (gastroesophageal reflux disease)    • H/O foot surgery     left foot orif   • History of hernia surgery     right inguinal   • Pacemaker    • PAF (paroxysmal atrial fibrillation) (CMS/HCC)    • Rotator cuff injury     bilat   • Varicose vein of leg     left lower leg       Social History:  Social History     Socioeconomic History   • Marital status:      Spouse name: Not on file   • Number of children: Not on file   • Years of education: Not on file   • Highest education level: Not on file   Tobacco Use   • Smoking status: Never Smoker    • Smokeless tobacco: Current User     Types: Chew   • Tobacco comment: patient counseled on effectsof tobacco use on health.    Substance and Sexual Activity   • Alcohol use: No   • Drug use: No   • Sexual activity: Defer     Partners: Female     Birth control/protection: None       Surgical History:  Past Surgical History:   Procedure Laterality Date   • APPENDECTOMY     • CARDIAC CATHETERIZATION     • CARDIAC SURGERY      pacemaker   • CARDIOVASCULAR STRESS TEST  04/21/2014    Dr SANDRA GÓMEZ 58%, pos for ischemia    • CARDIOVASCULAR STRESS TEST  06/26/2018    [facility]- nuclear stress test reported as normal. LVEF 62%   • COLONOSCOPY     • CONVERTED (HISTORICAL) HOLTER  03/04/2015    16 beat run of PAF baseline sinus dago at 50, no symptoms recorded   • CONVERTED (HISTORICAL) HOLTER  07/31/2018    72 hour- baseline sinus- minium 37 bpm and max 96 bpm, 36 pauses noted longest 2.3 sec, 4 beat SVT   • ECHO - CONVERTED  04/16/2014    heart and vascular Dr SANDRA GÓMEZ 65%, mild AR   • ENDOSCOPY     • FOOT SURGERY     • HERNIA REPAIR     • OTHER SURGICAL HISTORY  2015    Ecardio- sinus dago, continue observation   • PACEMAKER IMPLANTATION     • SHOULDER ARTHROSCOPY W/ ROTATOR CUFF REPAIR Right 3/19/2019    Procedure: SHOULDER ARTHROSCOPY WITH  OPEN ROTATOR CUFF REPAIR, ACOMIOPLASTY;  Surgeon: Tylor Berry MD;  Location: [facility] OR;  Service: Orthopedics   • SHOULDER ARTHROSCOPY W/ ROTATOR CUFF REPAIR Left 7/11/2019    Procedure: SHOULDER ARTHROSCOPY WITH MINI OPEN ROTATOR CUFF REPAIR, ACROMIOPLASTY;  Surgeon: Tylor Berry MD;  Location: [facility] OR;  Service: Orthopedics     Examination:   Left shoulder MAY shin shows oblique incision intact staples in place.  Sensation is intact.  He does have moderate swelling soft tissue about his elbow and forearm.    Assessment & Plan:   73 y.o. male doing well following left shoulder rotator cuff repair.  He is further to physical therapy per protocol.  He will  follow-up in 8 weeks time.  Think the elbow swelling is surgically related and will resolve with time.         Diagnosis Plan   1. Postop check     2. Complete tear of left rotator cuff     3. S/P left rotator cuff repair               Cc:  Jacquelin Turk, RAISSA              This document has been electronically signed by Tylor Berry MD   July 17, 2019 4:27 PM

## 2019-07-28 NOTE — H&P (VIEW-ONLY)
History and Physical        Patient: Pavel Claudio  YOB: 1945  Date of Encounter: 07/01/2019        Chief Complaint:        Chief Complaint   Patient presents with   • Right Shoulder - Post-op, Follow-up       03/19/19 (104d)            Tylor Berry MD                     SHOULDER ARTHROSCOPY WITH  OPEN ROTATOR CUFF REPAIR, ACOMIOPLASTY - Right         • Left Shoulder - Pain         HPI:   Pavel Claudio, 73 y.o. male, seen today for evaluation of left shoulder pain.  He has undergone repair of massive right shoulder rotator cuff tear March 19, 2019.  He is unable to undergo MRI due to pacemaker..  Intraoperatively he was found to have very large tear which was repaired surprisingly he reports little if any pain in his function is very good.  He has completed his physical therapy right shoulder.  His left shoulder symptoms began about 2 weeks ago.  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  He has had pain since with difficulty sleeping he reports that his symptoms are identical to his right shoulder complaints prior to his rotator cuff repair.        Active Problem List:      Patient Active Problem List   Diagnosis   • SSS (sick sinus syndrome) (CMS/[facility])   • Diabetes mellitus type 2, controlled, without complications (CMS/[facility])   • Paroxysmal atrial fibrillation (CMS/[facility])   • Medication management   • Bradycardia   • Chewing tobacco use   • Left axis deviation   • Current use of long term anticoagulation   • Complete tear of right rotator cuff   • Osteoarthritis of right AC (acromioclavicular) joint   • Impingement syndrome of right shoulder   • Incomplete tear of left rotator cuff   • Acute pain of left shoulder            Past Medical History:  Medical History        Past Medical History:   Diagnosis Date   • Anemia     • Anxiety     • Arthritis     • BPH (benign prostatic hyperplasia)     • CAD (coronary artery disease)      • Cheekbone fracture (CMS/HCC)       surgical repair    • Diabetes mellitus (CMS/HCC)     • GERD (gastroesophageal reflux disease)     • H/O foot surgery       left foot   • History of hernia surgery     • Pacemaker     • PAF (paroxysmal atrial fibrillation) (CMS/HCC)                 Past Surgical History:  Surgical History         Past Surgical History:   Procedure Laterality Date   • APPENDECTOMY       • CARDIAC CATHETERIZATION       • CARDIAC SURGERY       • CARDIOVASCULAR STRESS TEST   04/21/2014     Dr SANDRA GÓMEZ 58%, pos for ischemia    • CARDIOVASCULAR STRESS TEST   06/26/2018     [facility]- nuclear stress test reported as normal. LVEF 62%   • COLONOSCOPY       • CONVERTED (HISTORICAL) HOLTER   03/04/2015     16 beat run of PAF baseline sinus dago at 50, no symptoms recorded   • CONVERTED (HISTORICAL) HOLTER   07/31/2018     72 hour- baseline sinus- minium 37 bpm and max 96 bpm, 36 pauses noted longest 2.3 sec, 4 beat SVT   • ECHO - CONVERTED   04/16/2014     heart and vascular Dr SANDRA GÓMEZ 65%, mild AR   • ENDOSCOPY       • FOOT SURGERY       • HERNIA REPAIR       • OTHER SURGICAL HISTORY   2015     Ecardio- sinus dago, continue observation   • PACEMAKER IMPLANTATION       • SHOULDER ARTHROSCOPY W/ ROTATOR CUFF REPAIR Right 3/19/2019     Procedure: SHOULDER ARTHROSCOPY WITH  OPEN ROTATOR CUFF REPAIR, ACOMIOPLASTY;  Surgeon: Tylor Berry MD;  Location: [facility];  Service: Orthopedics               Family History:        Family History   Problem Relation Age of Onset   • Lung disease Mother     • Cancer Sister     • Cancer Brother              Social History:  Social History   Social History            Socioeconomic History   • Marital status:        Spouse name: Not on file   • Number of children: Not on file   • Years of education: Not on file   • Highest education level: Not on file   Tobacco Use   • Smoking status: Never Smoker   • Smokeless tobacco: Current User       Types: Chew   • Tobacco  comment: patient counseled on effectsof tobacco use on health.    Substance and Sexual Activity   • Alcohol use: No   • Drug use: No   • Sexual activity: Defer       Partners: Female       Birth control/protection: None         Body mass index is 23.66 kg/m².        Medications:  Current Medications          Current Outpatient Medications   Medication Sig Dispense Refill   • alfuzosin (UROXATRAL) 10 MG 24 hr tablet Take 10 mg by mouth Every Night.       • apixaban (ELIQUIS) 5 MG tablet tablet Take 1 tablet by mouth Every 12 (Twelve) Hours. 180 tablet 3   • dicyclomine (BENTYL) 20 MG tablet Take 20 mg by mouth 2 (Two) Times a Day.       • ferrous sulfate 325 (65 FE) MG tablet Take 325 mg by mouth Daily With Breakfast.       • lansoprazole (PREVACID) 30 MG capsule Take 30 mg by mouth 2 (two) times a day.       • MEGARED OMEGA-3 KRILL OIL PO Take 1 capsule by mouth Daily.       • metFORMIN (GLUCOPHAGE) 500 MG tablet Take 500 mg by mouth daily with breakfast.       • pregabalin (LYRICA) 150 MG capsule Take 150 mg by mouth 2 (Two) Times a Day.       • rOPINIRole (REQUIP) 0.25 MG tablet Take 0.25 mg by mouth 2 (Two) Times a Day. Take 1 hour before bedtime.        • mirtazapine (REMERON) 30 MG tablet Take 15 mg by mouth Every Night.          No current facility-administered medications for this visit.                Allergies:  No Known Allergies        Review of Systems:   Review of Systems   Constitutional: Negative.    HENT: Negative.    Eyes: Negative.    Respiratory: Negative.    Cardiovascular: Negative.    Gastrointestinal: Negative.    Endocrine: Negative.    Genitourinary: Negative.    Musculoskeletal: Positive for arthralgias.   Skin: Negative.    Allergic/Immunologic: Negative.    Hematological: Negative.    Psychiatric/Behavioral: Negative.             Physical Exam:   Physical Exam   Constitutional: He is oriented to person, place, and time. He appears well-developed and well-nourished.   HENT:   Head:  "Normocephalic and atraumatic.   Mouth/Throat: Oropharynx is clear and moist.   Eyes: Conjunctivae and EOM are normal. Pupils are equal, round, and reactive to light.   Neck: Normal range of motion. Neck supple. No JVD present. No thyromegaly present.   Cardiovascular: Normal rate, regular rhythm and normal heart sounds. Exam reveals no gallop and no friction rub.   No murmur heard.  Pulmonary/Chest: Effort normal and breath sounds normal. No respiratory distress. He has no wheezes. He has no rales. He exhibits no tenderness.   Abdominal: Bowel sounds are normal.   Lymphadenopathy:     He has no cervical adenopathy.   Neurological: He is alert and oriented to person, place, and time.   Skin: Skin is warm and dry.   Psychiatric: He has a normal mood and affect.   Nursing note and vitals reviewed.     GENERAL: 73 y.o. male, alert and oriented X 3 in no acute distress.   Visit Vitals  /62   Pulse (!) 49   Ht 177.8 cm (70\")   Wt 74.8 kg (164 lb 14.5 oz)   BMI 23.66 kg/m²         Musculoskeletal: Left shoulder evaluation reveals imminent ability to forward elevate to 80 degrees.  With Jobes maneuver he has marked weakness and significant pain.  He has good strength with internal and external rotation.  He has minimal tenderness over the acromioclavicular joint and minimal pain with crossarm adduction.  Bicipital groove is nontender biceps muscle is of normal contour.     Radiology/Labs: Left shoulder radiographs show no arthritic changes within the glenohumeral joint no arthritic changes within the acromioclavicular joint normal subacromial space.        Assessment & Plan:   73 y.o. male with clinical presentation consistent with rotator cuff tear left shoulder.  He does not wish to allow his left shoulder to progress to severity of his right shoulder and is very interested in being repair of his rotator cuff now.  Even his local findings of significant weakness and description confident that he does have rotator " cuff pathology.  We will therefore offer him left shoulder arthroscopy with possible open rotator cuff repair acromioplasty.  Surgery to be scheduled [facility] 7/11/2019.         ICD-10-CM ICD-9-CM   1. Incomplete tear of left rotator cuff M75.112 840.4   2. Acute pain of left shoulder M25.512 719.41               Cc:   Jacquelin Turk, APRN                   This document has been electronically signed by Tylor Berry MD   July 10, 2019 7:54 PM

## 2019-07-28 NOTE — H&P
History and Physical        Patient: Pavel Claudio  YOB: 1945  Date of Encounter: 07/01/2019        Chief Complaint:        Chief Complaint   Patient presents with   • Right Shoulder - Post-op, Follow-up       03/19/19 (104d)            Tylor Berry MD                     SHOULDER ARTHROSCOPY WITH  OPEN ROTATOR CUFF REPAIR, ACOMIOPLASTY - Right         • Left Shoulder - Pain         HPI:   Pavel Claudio, 73 y.o. male, seen today for evaluation of left shoulder pain.  He has undergone repair of massive right shoulder rotator cuff tear March 19, 2019.  He is unable to undergo MRI due to pacemaker..  Intraoperatively he was found to have very large tear which was repaired surprisingly he reports little if any pain in his function is very good.  He has completed his physical therapy right shoulder.  His left shoulder symptoms began about 2 weeks ago.  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  He has had pain since with difficulty sleeping he reports that his symptoms are identical to his right shoulder complaints prior to his rotator cuff repair.        Active Problem List:      Patient Active Problem List   Diagnosis   • SSS (sick sinus syndrome) (CMS/[facility])   • Diabetes mellitus type 2, controlled, without complications (CMS/[facility])   • Paroxysmal atrial fibrillation (CMS/[facility])   • Medication management   • Bradycardia   • Chewing tobacco use   • Left axis deviation   • Current use of long term anticoagulation   • Complete tear of right rotator cuff   • Osteoarthritis of right AC (acromioclavicular) joint   • Impingement syndrome of right shoulder   • Incomplete tear of left rotator cuff   • Acute pain of left shoulder            Past Medical History:  Medical History        Past Medical History:   Diagnosis Date   • Anemia     • Anxiety     • Arthritis     • BPH (benign prostatic hyperplasia)     • CAD (coronary artery disease)      • Cheekbone fracture (CMS/HCC)       surgical repair    • Diabetes mellitus (CMS/HCC)     • GERD (gastroesophageal reflux disease)     • H/O foot surgery       left foot   • History of hernia surgery     • Pacemaker     • PAF (paroxysmal atrial fibrillation) (CMS/HCC)                 Past Surgical History:  Surgical History         Past Surgical History:   Procedure Laterality Date   • APPENDECTOMY       • CARDIAC CATHETERIZATION       • CARDIAC SURGERY       • CARDIOVASCULAR STRESS TEST   04/21/2014     Dr SANDRA GÓMEZ 58%, pos for ischemia    • CARDIOVASCULAR STRESS TEST   06/26/2018     [facility]- nuclear stress test reported as normal. LVEF 62%   • COLONOSCOPY       • CONVERTED (HISTORICAL) HOLTER   03/04/2015     16 beat run of PAF baseline sinus dago at 50, no symptoms recorded   • CONVERTED (HISTORICAL) HOLTER   07/31/2018     72 hour- baseline sinus- minium 37 bpm and max 96 bpm, 36 pauses noted longest 2.3 sec, 4 beat SVT   • ECHO - CONVERTED   04/16/2014     heart and vascular Dr SANDRA GÓMEZ 65%, mild AR   • ENDOSCOPY       • FOOT SURGERY       • HERNIA REPAIR       • OTHER SURGICAL HISTORY   2015     Ecardio- sinus dago, continue observation   • PACEMAKER IMPLANTATION       • SHOULDER ARTHROSCOPY W/ ROTATOR CUFF REPAIR Right 3/19/2019     Procedure: SHOULDER ARTHROSCOPY WITH  OPEN ROTATOR CUFF REPAIR, ACOMIOPLASTY;  Surgeon: Tylor Berry MD;  Location: [facility];  Service: Orthopedics               Family History:        Family History   Problem Relation Age of Onset   • Lung disease Mother     • Cancer Sister     • Cancer Brother              Social History:  Social History   Social History            Socioeconomic History   • Marital status:        Spouse name: Not on file   • Number of children: Not on file   • Years of education: Not on file   • Highest education level: Not on file   Tobacco Use   • Smoking status: Never Smoker   • Smokeless tobacco: Current User       Types: Chew   • Tobacco  comment: patient counseled on effectsof tobacco use on health.    Substance and Sexual Activity   • Alcohol use: No   • Drug use: No   • Sexual activity: Defer       Partners: Female       Birth control/protection: None         Body mass index is 23.66 kg/m².        Medications:  Current Medications          Current Outpatient Medications   Medication Sig Dispense Refill   • alfuzosin (UROXATRAL) 10 MG 24 hr tablet Take 10 mg by mouth Every Night.       • apixaban (ELIQUIS) 5 MG tablet tablet Take 1 tablet by mouth Every 12 (Twelve) Hours. 180 tablet 3   • dicyclomine (BENTYL) 20 MG tablet Take 20 mg by mouth 2 (Two) Times a Day.       • ferrous sulfate 325 (65 FE) MG tablet Take 325 mg by mouth Daily With Breakfast.       • lansoprazole (PREVACID) 30 MG capsule Take 30 mg by mouth 2 (two) times a day.       • MEGARED OMEGA-3 KRILL OIL PO Take 1 capsule by mouth Daily.       • metFORMIN (GLUCOPHAGE) 500 MG tablet Take 500 mg by mouth daily with breakfast.       • pregabalin (LYRICA) 150 MG capsule Take 150 mg by mouth 2 (Two) Times a Day.       • rOPINIRole (REQUIP) 0.25 MG tablet Take 0.25 mg by mouth 2 (Two) Times a Day. Take 1 hour before bedtime.        • mirtazapine (REMERON) 30 MG tablet Take 15 mg by mouth Every Night.          No current facility-administered medications for this visit.                Allergies:  No Known Allergies        Review of Systems:   Review of Systems   Constitutional: Negative.    HENT: Negative.    Eyes: Negative.    Respiratory: Negative.    Cardiovascular: Negative.    Gastrointestinal: Negative.    Endocrine: Negative.    Genitourinary: Negative.    Musculoskeletal: Positive for arthralgias.   Skin: Negative.    Allergic/Immunologic: Negative.    Hematological: Negative.    Psychiatric/Behavioral: Negative.             Physical Exam:   Physical Exam   Constitutional: He is oriented to person, place, and time. He appears well-developed and well-nourished.   HENT:   Head:  "Normocephalic and atraumatic.   Mouth/Throat: Oropharynx is clear and moist.   Eyes: Conjunctivae and EOM are normal. Pupils are equal, round, and reactive to light.   Neck: Normal range of motion. Neck supple. No JVD present. No thyromegaly present.   Cardiovascular: Normal rate, regular rhythm and normal heart sounds. Exam reveals no gallop and no friction rub.   No murmur heard.  Pulmonary/Chest: Effort normal and breath sounds normal. No respiratory distress. He has no wheezes. He has no rales. He exhibits no tenderness.   Abdominal: Bowel sounds are normal.   Lymphadenopathy:     He has no cervical adenopathy.   Neurological: He is alert and oriented to person, place, and time.   Skin: Skin is warm and dry.   Psychiatric: He has a normal mood and affect.   Nursing note and vitals reviewed.     GENERAL: 73 y.o. male, alert and oriented X 3 in no acute distress.   Visit Vitals  /62   Pulse (!) 49   Ht 177.8 cm (70\")   Wt 74.8 kg (164 lb 14.5 oz)   BMI 23.66 kg/m²         Musculoskeletal: Left shoulder evaluation reveals imminent ability to forward elevate to 80 degrees.  With Jobes maneuver he has marked weakness and significant pain.  He has good strength with internal and external rotation.  He has minimal tenderness over the acromioclavicular joint and minimal pain with crossarm adduction.  Bicipital groove is nontender biceps muscle is of normal contour.     Radiology/Labs: Left shoulder radiographs show no arthritic changes within the glenohumeral joint no arthritic changes within the acromioclavicular joint normal subacromial space.        Assessment & Plan:   73 y.o. male with clinical presentation consistent with rotator cuff tear left shoulder.  He does not wish to allow his left shoulder to progress to severity of his right shoulder and is very interested in being repair of his rotator cuff now.  Even his local findings of significant weakness and description confident that he does have rotator " cuff pathology.  We will therefore offer him left shoulder arthroscopy with possible open rotator cuff repair acromioplasty.  Surgery to be scheduled [facility] 7/11/2019.         ICD-10-CM ICD-9-CM   1. Incomplete tear of left rotator cuff M75.112 840.4   2. Acute pain of left shoulder M25.512 719.41               Cc:   Jacquelin Turk, APRN                   This document has been electronically signed by Tylor Berry MD   July 10, 2019 7:54 PM

## 2019-08-12 ENCOUNTER — APPOINTMENT (OUTPATIENT)
Dept: CT IMAGING | Facility: HOSPITAL | Age: 74
End: 2019-08-12

## 2019-08-12 ENCOUNTER — HOSPITAL ENCOUNTER (INPATIENT)
Facility: HOSPITAL | Age: 74
LOS: 1 days | Discharge: HOME | End: 2019-08-14
Attending: FAMILY MEDICINE | Admitting: HOSPITALIST

## 2019-08-12 ENCOUNTER — APPOINTMENT (OUTPATIENT)
Dept: GENERAL RADIOLOGY | Facility: HOSPITAL | Age: 74
End: 2019-08-12

## 2019-08-12 DIAGNOSIS — R42 POSTURAL DIZZINESS WITH NEAR SYNCOPE: Primary | ICD-10-CM

## 2019-08-12 DIAGNOSIS — R55 POSTURAL DIZZINESS WITH NEAR SYNCOPE: Primary | ICD-10-CM

## 2019-08-12 LAB
ALBUMIN SERPL-MCNC: 3.51 G/DL (ref 3.5–5.2)
ALBUMIN/GLOB SERPL: 0.9 G/DL
ALP SERPL-CCNC: 97 U/L (ref 39–117)
ALT SERPL W P-5'-P-CCNC: 15 U/L (ref 1–41)
ANION GAP SERPL CALCULATED.3IONS-SCNC: 13.3 MMOL/L (ref 5–15)
APTT PPP: 47 SECONDS (ref 23.8–36.1)
AST SERPL-CCNC: 19 U/L (ref 1–40)
BASOPHILS # BLD AUTO: 0.02 10*3/MM3 (ref 0–0.2)
BASOPHILS NFR BLD AUTO: 0.2 % (ref 0–1.5)
BILIRUB SERPL-MCNC: 0.2 MG/DL (ref 0.2–1.2)
BILIRUB UR QL STRIP: NEGATIVE
BUN BLD-MCNC: 17 MG/DL (ref 8–23)
BUN/CREAT SERPL: 18.3 (ref 7–25)
CALCIUM SPEC-SCNC: 9.7 MG/DL (ref 8.6–10.5)
CHLORIDE SERPL-SCNC: 104 MMOL/L (ref 98–107)
CLARITY UR: ABNORMAL
CO2 SERPL-SCNC: 22.7 MMOL/L (ref 22–29)
COLOR UR: ABNORMAL
CREAT BLD-MCNC: 0.93 MG/DL (ref 0.76–1.27)
D DIMER PPP FEU-MCNC: 2.02 MCGFEU/ML (ref 0–0.5)
DEPRECATED RDW RBC AUTO: 41.8 FL (ref 37–54)
EOSINOPHIL # BLD AUTO: 0.27 10*3/MM3 (ref 0–0.4)
EOSINOPHIL NFR BLD AUTO: 3.3 % (ref 0.3–6.2)
ERYTHROCYTE [DISTWIDTH] IN BLOOD BY AUTOMATED COUNT: 13 % (ref 12.3–15.4)
GFR SERPL CREATININE-BSD FRML MDRD: 80 ML/MIN/1.73
GLOBULIN UR ELPH-MCNC: 3.8 GM/DL
GLUCOSE BLD-MCNC: 135 MG/DL (ref 65–99)
GLUCOSE UR STRIP-MCNC: NEGATIVE MG/DL
HCT VFR BLD AUTO: 34.9 % (ref 37.5–51)
HGB BLD-MCNC: 11.2 G/DL (ref 13–17.7)
HGB UR QL STRIP.AUTO: NEGATIVE
HOLD SPECIMEN: NORMAL
HOLD SPECIMEN: NORMAL
IMM GRANULOCYTES # BLD AUTO: 0.05 10*3/MM3 (ref 0–0.05)
IMM GRANULOCYTES NFR BLD AUTO: 0.6 % (ref 0–0.5)
INR PPP: 1.47 (ref 0.9–1.1)
KETONES UR QL STRIP: NEGATIVE
LEUKOCYTE ESTERASE UR QL STRIP.AUTO: NEGATIVE
LIPASE SERPL-CCNC: 44 U/L (ref 13–60)
LYMPHOCYTES # BLD AUTO: 1.63 10*3/MM3 (ref 0.7–3.1)
LYMPHOCYTES NFR BLD AUTO: 20.2 % (ref 19.6–45.3)
MCH RBC QN AUTO: 28.1 PG (ref 26.6–33)
MCHC RBC AUTO-ENTMCNC: 32.1 G/DL (ref 31.5–35.7)
MCV RBC AUTO: 87.5 FL (ref 79–97)
MONOCYTES # BLD AUTO: 1.15 10*3/MM3 (ref 0.1–0.9)
MONOCYTES NFR BLD AUTO: 14.3 % (ref 5–12)
NEUTROPHILS # BLD AUTO: 4.95 10*3/MM3 (ref 1.7–7)
NEUTROPHILS NFR BLD AUTO: 61.4 % (ref 42.7–76)
NITRITE UR QL STRIP: NEGATIVE
NRBC BLD AUTO-RTO: 0 /100 WBC (ref 0–0.2)
PH UR STRIP.AUTO: 5.5 [PH] (ref 5–8)
PLATELET # BLD AUTO: 352 10*3/MM3 (ref 140–450)
PMV BLD AUTO: 10.5 FL (ref 6–12)
POTASSIUM BLD-SCNC: 3.9 MMOL/L (ref 3.5–5.2)
PROT SERPL-MCNC: 7.3 G/DL (ref 6–8.5)
PROT UR QL STRIP: ABNORMAL
PROTHROMBIN TIME: 18.6 SECONDS (ref 11–15.4)
RBC # BLD AUTO: 3.99 10*6/MM3 (ref 4.14–5.8)
SODIUM BLD-SCNC: 140 MMOL/L (ref 136–145)
SP GR UR STRIP: >=1.03 (ref 1–1.03)
TROPONIN T SERPL-MCNC: <0.01 NG/ML (ref 0–0.03)
UROBILINOGEN UR QL STRIP: ABNORMAL
WBC NRBC COR # BLD: 8.07 10*3/MM3 (ref 3.4–10.8)
WHOLE BLOOD HOLD SPECIMEN: NORMAL
WHOLE BLOOD HOLD SPECIMEN: NORMAL

## 2019-08-12 PROCEDURE — 85610 PROTHROMBIN TIME: CPT | Performed by: FAMILY MEDICINE

## 2019-08-12 PROCEDURE — 93005 ELECTROCARDIOGRAM TRACING: CPT | Performed by: FAMILY MEDICINE

## 2019-08-12 PROCEDURE — 71275 CT ANGIOGRAPHY CHEST: CPT

## 2019-08-12 PROCEDURE — 85379 FIBRIN DEGRADATION QUANT: CPT | Performed by: FAMILY MEDICINE

## 2019-08-12 PROCEDURE — 93010 ELECTROCARDIOGRAM REPORT: CPT | Performed by: INTERNAL MEDICINE

## 2019-08-12 PROCEDURE — 85730 THROMBOPLASTIN TIME PARTIAL: CPT | Performed by: FAMILY MEDICINE

## 2019-08-12 PROCEDURE — 83036 HEMOGLOBIN GLYCOSYLATED A1C: CPT | Performed by: INTERNAL MEDICINE

## 2019-08-12 PROCEDURE — 84443 ASSAY THYROID STIM HORMONE: CPT | Performed by: INTERNAL MEDICINE

## 2019-08-12 PROCEDURE — 81003 URINALYSIS AUTO W/O SCOPE: CPT | Performed by: FAMILY MEDICINE

## 2019-08-12 PROCEDURE — 85025 COMPLETE CBC W/AUTO DIFF WBC: CPT | Performed by: FAMILY MEDICINE

## 2019-08-12 PROCEDURE — 99284 EMERGENCY DEPT VISIT MOD MDM: CPT

## 2019-08-12 PROCEDURE — 71046 X-RAY EXAM CHEST 2 VIEWS: CPT | Performed by: RADIOLOGY

## 2019-08-12 PROCEDURE — 71046 X-RAY EXAM CHEST 2 VIEWS: CPT

## 2019-08-12 PROCEDURE — 83690 ASSAY OF LIPASE: CPT | Performed by: FAMILY MEDICINE

## 2019-08-12 PROCEDURE — 84484 ASSAY OF TROPONIN QUANT: CPT | Performed by: FAMILY MEDICINE

## 2019-08-12 PROCEDURE — 0 IOVERSOL 74 % SOLUTION: Performed by: FAMILY MEDICINE

## 2019-08-12 PROCEDURE — 80053 COMPREHEN METABOLIC PANEL: CPT | Performed by: FAMILY MEDICINE

## 2019-08-12 PROCEDURE — 36415 COLL VENOUS BLD VENIPUNCTURE: CPT

## 2019-08-12 RX ORDER — ASPIRIN 325 MG
325 TABLET ORAL ONCE
Status: DISCONTINUED | OUTPATIENT
Start: 2019-08-12 | End: 2019-08-12

## 2019-08-12 RX ORDER — SODIUM CHLORIDE 0.9 % (FLUSH) 0.9 %
10 SYRINGE (ML) INJECTION AS NEEDED
Status: DISCONTINUED | OUTPATIENT
Start: 2019-08-12 | End: 2019-08-14 | Stop reason: HOSPADM

## 2019-08-12 RX ORDER — CLONIDINE HYDROCHLORIDE 0.1 MG/1
0.2 TABLET ORAL ONCE
Status: DISCONTINUED | OUTPATIENT
Start: 2019-08-12 | End: 2019-08-12

## 2019-08-12 RX ORDER — SODIUM CHLORIDE 9 MG/ML
125 INJECTION, SOLUTION INTRAVENOUS CONTINUOUS
Status: DISCONTINUED | OUTPATIENT
Start: 2019-08-12 | End: 2019-08-14 | Stop reason: HOSPADM

## 2019-08-12 RX ADMIN — SODIUM CHLORIDE 125 ML/HR: 9 INJECTION, SOLUTION INTRAVENOUS at 22:25

## 2019-08-12 RX ADMIN — SODIUM CHLORIDE 1000 ML: 9 INJECTION, SOLUTION INTRAVENOUS at 22:25

## 2019-08-12 RX ADMIN — IOVERSOL 95 ML: 741 INJECTION INTRA-ARTERIAL; INTRAVENOUS at 23:59

## 2019-08-13 ENCOUNTER — APPOINTMENT (OUTPATIENT)
Dept: ULTRASOUND IMAGING | Facility: HOSPITAL | Age: 74
End: 2019-08-13

## 2019-08-13 ENCOUNTER — APPOINTMENT (OUTPATIENT)
Dept: CARDIOLOGY | Facility: HOSPITAL | Age: 74
End: 2019-08-13

## 2019-08-13 ENCOUNTER — APPOINTMENT (OUTPATIENT)
Dept: CT IMAGING | Facility: HOSPITAL | Age: 74
End: 2019-08-13

## 2019-08-13 PROBLEM — R55 POSTURAL DIZZINESS WITH PRESYNCOPE: Status: ACTIVE | Noted: 2019-08-13

## 2019-08-13 PROBLEM — R42 POSTURAL DIZZINESS WITH PRESYNCOPE: Status: ACTIVE | Noted: 2019-08-13

## 2019-08-13 LAB
BH CV ECHO MEAS - % IVS THICK: 48 %
BH CV ECHO MEAS - % LVPW THICK: 94.4 %
BH CV ECHO MEAS - ACS: 2.3 CM
BH CV ECHO MEAS - AO ROOT AREA (BSA CORRECTED): 2
BH CV ECHO MEAS - AO ROOT AREA: 11.3 CM^2
BH CV ECHO MEAS - AO ROOT DIAM: 3.8 CM
BH CV ECHO MEAS - BSA(HAYCOCK): 1.9 M^2
BH CV ECHO MEAS - BSA: 1.9 M^2
BH CV ECHO MEAS - BZI_BMI: 22.7 KILOGRAMS/M^2
BH CV ECHO MEAS - BZI_METRIC_HEIGHT: 177.8 CM
BH CV ECHO MEAS - BZI_METRIC_WEIGHT: 71.7 KG
BH CV ECHO MEAS - EDV(CUBED): 152.3 ML
BH CV ECHO MEAS - EDV(MOD-SP4): 104 ML
BH CV ECHO MEAS - EDV(TEICH): 137.7 ML
BH CV ECHO MEAS - EF(CUBED): 74.4 %
BH CV ECHO MEAS - EF(MOD-SP4): 70.2 %
BH CV ECHO MEAS - EF(TEICH): 65.8 %
BH CV ECHO MEAS - ESV(CUBED): 39 ML
BH CV ECHO MEAS - ESV(MOD-SP4): 31 ML
BH CV ECHO MEAS - ESV(TEICH): 47.1 ML
BH CV ECHO MEAS - FS: 36.5 %
BH CV ECHO MEAS - IVS/LVPW: 1.2
BH CV ECHO MEAS - IVSD: 1.3 CM
BH CV ECHO MEAS - IVSS: 1.9 CM
BH CV ECHO MEAS - LA DIMENSION: 4.7 CM
BH CV ECHO MEAS - LA/AO: 1.2
BH CV ECHO MEAS - LV DIASTOLIC VOL/BSA (35-75): 55.1 ML/M^2
BH CV ECHO MEAS - LV MASS(C)D: 250.8 GRAMS
BH CV ECHO MEAS - LV MASS(C)DI: 132.8 GRAMS/M^2
BH CV ECHO MEAS - LV MASS(C)S: 298.5 GRAMS
BH CV ECHO MEAS - LV MASS(C)SI: 158.1 GRAMS/M^2
BH CV ECHO MEAS - LV SYSTOLIC VOL/BSA (12-30): 16.4 ML/M^2
BH CV ECHO MEAS - LVIDD: 5.3 CM
BH CV ECHO MEAS - LVIDS: 3.4 CM
BH CV ECHO MEAS - LVLD AP4: 7.8 CM
BH CV ECHO MEAS - LVLS AP4: 6.9 CM
BH CV ECHO MEAS - LVOT AREA (M): 4.2 CM^2
BH CV ECHO MEAS - LVOT AREA: 4.2 CM^2
BH CV ECHO MEAS - LVOT DIAM: 2.3 CM
BH CV ECHO MEAS - LVPWD: 1.1 CM
BH CV ECHO MEAS - LVPWS: 2.1 CM
BH CV ECHO MEAS - MV A MAX VEL: 76 CM/SEC
BH CV ECHO MEAS - MV E MAX VEL: 50.3 CM/SEC
BH CV ECHO MEAS - MV E/A: 0.66
BH CV ECHO MEAS - PA ACC SLOPE: 979 CM/SEC^2
BH CV ECHO MEAS - PA ACC TIME: 0.14 SEC
BH CV ECHO MEAS - PA PR(ACCEL): 17.4 MMHG
BH CV ECHO MEAS - RAP SYSTOLE: 10 MMHG
BH CV ECHO MEAS - RVDD: 2.5 CM
BH CV ECHO MEAS - RVSP: 47.2 MMHG
BH CV ECHO MEAS - SI(CUBED): 60 ML/M^2
BH CV ECHO MEAS - SI(MOD-SP4): 38.7 ML/M^2
BH CV ECHO MEAS - SI(TEICH): 48 ML/M^2
BH CV ECHO MEAS - SV(CUBED): 113.3 ML
BH CV ECHO MEAS - SV(MOD-SP4): 73 ML
BH CV ECHO MEAS - SV(TEICH): 90.6 ML
BH CV ECHO MEAS - TR MAX VEL: 305 CM/SEC
HBA1C MFR BLD: 5.9 % (ref 4.8–5.6)
MAXIMAL PREDICTED HEART RATE: 147 BPM
STRESS TARGET HR: 125 BPM
TSH SERPL DL<=0.05 MIU/L-ACNC: 1.28 MIU/ML (ref 0.27–4.2)

## 2019-08-13 PROCEDURE — 70450 CT HEAD/BRAIN W/O DYE: CPT

## 2019-08-13 PROCEDURE — 93880 EXTRACRANIAL BILAT STUDY: CPT

## 2019-08-13 PROCEDURE — 93923 UPR/LXTR ART STDY 3+ LVLS: CPT | Performed by: RADIOLOGY

## 2019-08-13 PROCEDURE — 86753 PROTOZOA ANTIBODY NOS: CPT | Performed by: INTERNAL MEDICINE

## 2019-08-13 PROCEDURE — 70450 CT HEAD/BRAIN W/O DYE: CPT | Performed by: RADIOLOGY

## 2019-08-13 PROCEDURE — 99223 1ST HOSP IP/OBS HIGH 75: CPT | Performed by: INTERNAL MEDICINE

## 2019-08-13 PROCEDURE — 93880 EXTRACRANIAL BILAT STUDY: CPT | Performed by: RADIOLOGY

## 2019-08-13 PROCEDURE — 86666 EHRLICHIA ANTIBODY: CPT | Performed by: INTERNAL MEDICINE

## 2019-08-13 PROCEDURE — 93306 TTE W/DOPPLER COMPLETE: CPT | Performed by: INTERNAL MEDICINE

## 2019-08-13 PROCEDURE — 93288 INTERROG EVL PM/LDLS PM IP: CPT

## 2019-08-13 PROCEDURE — 93923 UPR/LXTR ART STDY 3+ LVLS: CPT

## 2019-08-13 PROCEDURE — 94799 UNLISTED PULMONARY SVC/PX: CPT

## 2019-08-13 PROCEDURE — 93306 TTE W/DOPPLER COMPLETE: CPT

## 2019-08-13 PROCEDURE — 86757 RICKETTSIA ANTIBODY: CPT | Performed by: INTERNAL MEDICINE

## 2019-08-13 PROCEDURE — 86618 LYME DISEASE ANTIBODY: CPT | Performed by: INTERNAL MEDICINE

## 2019-08-13 RX ORDER — ZOLPIDEM TARTRATE 5 MG/1
5 TABLET ORAL NIGHTLY PRN
Status: DISCONTINUED | OUTPATIENT
Start: 2019-08-13 | End: 2019-08-14 | Stop reason: HOSPADM

## 2019-08-13 RX ORDER — MIRTAZAPINE 15 MG/1
15 TABLET, FILM COATED ORAL NIGHTLY
Status: ON HOLD | COMMUNITY
End: 2020-09-15

## 2019-08-13 RX ORDER — FERROUS SULFATE 325(65) MG
325 TABLET ORAL
Status: DISCONTINUED | OUTPATIENT
Start: 2019-08-13 | End: 2019-08-14 | Stop reason: HOSPADM

## 2019-08-13 RX ORDER — ROPINIROLE 0.25 MG/1
0.25 TABLET, FILM COATED ORAL 2 TIMES DAILY
Status: DISCONTINUED | OUTPATIENT
Start: 2019-08-13 | End: 2019-08-14 | Stop reason: HOSPADM

## 2019-08-13 RX ORDER — SODIUM CHLORIDE 0.9 % (FLUSH) 0.9 %
3-10 SYRINGE (ML) INJECTION AS NEEDED
Status: DISCONTINUED | OUTPATIENT
Start: 2019-08-13 | End: 2019-08-14 | Stop reason: HOSPADM

## 2019-08-13 RX ORDER — DOXYCYCLINE 100 MG/1
100 CAPSULE ORAL EVERY 12 HOURS SCHEDULED
Status: DISCONTINUED | OUTPATIENT
Start: 2019-08-13 | End: 2019-08-14 | Stop reason: HOSPADM

## 2019-08-13 RX ORDER — ZOLPIDEM TARTRATE 5 MG/1
5 TABLET ORAL NIGHTLY PRN
COMMUNITY

## 2019-08-13 RX ORDER — SODIUM CHLORIDE 0.9 % (FLUSH) 0.9 %
3 SYRINGE (ML) INJECTION EVERY 12 HOURS SCHEDULED
Status: DISCONTINUED | OUTPATIENT
Start: 2019-08-13 | End: 2019-08-14 | Stop reason: HOSPADM

## 2019-08-13 RX ORDER — TAMSULOSIN HYDROCHLORIDE 0.4 MG/1
0.4 CAPSULE ORAL NIGHTLY
Status: DISCONTINUED | OUTPATIENT
Start: 2019-08-13 | End: 2019-08-14 | Stop reason: HOSPADM

## 2019-08-13 RX ORDER — PANTOPRAZOLE SODIUM 40 MG/1
40 TABLET, DELAYED RELEASE ORAL EVERY MORNING
Status: DISCONTINUED | OUTPATIENT
Start: 2019-08-13 | End: 2019-08-14 | Stop reason: HOSPADM

## 2019-08-13 RX ORDER — MIRTAZAPINE 15 MG/1
15 TABLET, FILM COATED ORAL NIGHTLY
Status: DISCONTINUED | OUTPATIENT
Start: 2019-08-13 | End: 2019-08-14 | Stop reason: HOSPADM

## 2019-08-13 RX ORDER — PREGABALIN 75 MG/1
150 CAPSULE ORAL 2 TIMES DAILY
Status: DISCONTINUED | OUTPATIENT
Start: 2019-08-13 | End: 2019-08-14 | Stop reason: HOSPADM

## 2019-08-13 RX ORDER — DICYCLOMINE HCL 20 MG
20 TABLET ORAL 2 TIMES DAILY
Status: DISCONTINUED | OUTPATIENT
Start: 2019-08-13 | End: 2019-08-14 | Stop reason: HOSPADM

## 2019-08-13 RX ORDER — L.ACID,PARA/B.BIFIDUM/S.THERM 8B CELL
1 CAPSULE ORAL DAILY
Status: DISCONTINUED | OUTPATIENT
Start: 2019-08-13 | End: 2019-08-14 | Stop reason: HOSPADM

## 2019-08-13 RX ORDER — NITROGLYCERIN 0.4 MG/1
0.4 TABLET SUBLINGUAL
Status: DISCONTINUED | OUTPATIENT
Start: 2019-08-13 | End: 2019-08-14 | Stop reason: HOSPADM

## 2019-08-13 RX ADMIN — DICYCLOMINE HYDROCHLORIDE 20 MG: 20 TABLET ORAL at 11:47

## 2019-08-13 RX ADMIN — FERROUS SULFATE TAB 325 MG (65 MG ELEMENTAL FE) 325 MG: 325 (65 FE) TAB at 11:48

## 2019-08-13 RX ADMIN — Medication 1 CAPSULE: at 15:31

## 2019-08-13 RX ADMIN — ROPINIROLE HYDROCHLORIDE 0.25 MG: 0.25 TABLET, FILM COATED ORAL at 11:47

## 2019-08-13 RX ADMIN — DOXYCYCLINE 100 MG: 100 CAPSULE ORAL at 21:37

## 2019-08-13 RX ADMIN — PREGABALIN 150 MG: 75 CAPSULE ORAL at 21:37

## 2019-08-13 RX ADMIN — PANTOPRAZOLE SODIUM 40 MG: 40 TABLET, DELAYED RELEASE ORAL at 11:48

## 2019-08-13 RX ADMIN — TAMSULOSIN HYDROCHLORIDE 0.4 MG: 0.4 CAPSULE ORAL at 21:37

## 2019-08-13 RX ADMIN — ZOLPIDEM TARTRATE 5 MG: 5 TABLET ORAL at 21:37

## 2019-08-13 RX ADMIN — APIXABAN 5 MG: 5 TABLET, FILM COATED ORAL at 21:37

## 2019-08-13 RX ADMIN — SODIUM CHLORIDE, PRESERVATIVE FREE 3 ML: 5 INJECTION INTRAVENOUS at 15:32

## 2019-08-13 RX ADMIN — DICYCLOMINE HYDROCHLORIDE 20 MG: 20 TABLET ORAL at 21:37

## 2019-08-13 RX ADMIN — MIRTAZAPINE 15 MG: 15 TABLET, FILM COATED ORAL at 21:37

## 2019-08-13 RX ADMIN — DOXYCYCLINE 100 MG: 100 CAPSULE ORAL at 11:48

## 2019-08-13 RX ADMIN — ROPINIROLE HYDROCHLORIDE 0.25 MG: 0.25 TABLET, FILM COATED ORAL at 21:37

## 2019-08-13 RX ADMIN — PREGABALIN 150 MG: 75 CAPSULE ORAL at 11:55

## 2019-08-13 RX ADMIN — APIXABAN 5 MG: 5 TABLET, FILM COATED ORAL at 11:55

## 2019-08-13 NOTE — ED NOTES
Patient presents to the ER with patient's family due to generalized weakness and dizziness for the past 2 weeks. Patient states that he was bitten by a tick at the beginning of July, states that the fatigue and weakness has worsened since being bit. Patient states that he has been taking doxycycline x4 days related to the tick bite. Patient reports a decrease in appetite, denies any pain.     Zonia Cedillo, TIM  08/12/19 2024

## 2019-08-13 NOTE — CONSULTS
Inpatient Cardiology Consult  Consult performed by: Mere Espinoza APRN  Consult ordered by: Yosef Gaines MD        Date of Admit: 8/12/2019  Date of Consult: 08/13/19  No ref. provider found  Pavel Claudio  1945    Consulting Physician: Jorge Luis Yanez MD    Cardiology consultation    Reason for consultation:  Advice regarding pacemaker, [facility] 2018    Assessment:  1. Near-syncope  2. Bradycardia  3. Sick sinus syndrome status post permanent pacemaker placement approximately 1 year ago  4. Paroxysmal atrial fibrillation, CHADsVASc is at least 2 for diabetes, age.  He is anticoagulated on Eliquis.  5. Nonobstructive coronary artery disease  6. Essential hypertension  7. Non-insulin-requiring type 2 diabetes      Recommendations:  1. [employer] to interrogate permanent pacemaker  2.       History of Present Illness    Subjective     Chief Complaint   Patient presents with   • Dizziness   • Weakness - Generalized       Pavel Claudio is a 73 y.o. male with past medical history significant for paroxysmal atrial fibrillation, chronically anticoagulated on Eliquis, sick sinus syndrome, status [facility], non-insulin-dependent type 2 diabetes, agent orange exposure, arthritis, BPH, and anxiety.  He presented to the ED on 8/12/2019 with report of 2-week history of worsening dizziness and weakness.  He states that when he gets up to walk he becomes dizzy and feels very weak.  He denies any associated shortness of breath.  He denies any clifford syncopal event.  He states he has never lost consciousness.  He does report associated decreased appetite.  He denies chest pain and palpitations.  In the ED CT of the head without contrast was negative, EKG showed atrial paced rhythm with prolonged AV conduction with a rate of 50.  He was noted to be bradycardic on occasion during his ED stay.  He was admitted for further evaluation and monitoring.  Cardiology was contacted  "due to bradycardia in the presence of permanent pacemaker.    Mr. Claudio was seen and examined.  He denies current dizziness.  He states that he gets dizzy when he gets up and walks around.  He has not done much of that since he has been in the hospital.  He reports permanent pacemaker 1 year ago and interrogated approximately 3 to 4 months ago.      Per primary admitting physician's note orthostatic blood pressures were obtained after Mr. Claudio received IV fluids in the ED.  The patient's heart rate did not rise above 52 and his blood pressures did not drop.    He denies past medical history of congenital heart defect, rheumatic fever.  He does report that he had cardiac catheterization approximately 3 years ago and it was \"fine\".  Denies tobacco use.  He denies any family history of coronary artery disease.      Cardiac risk factors:diabetes mellitus and age and sex    Last Echo: None available in the chart    Last Stress: 6/26/2018    [facility] has been reviewed.  There is no clinical, hemodynamic or electrocardiographic evidence for myocardial ischemia.  LVEF was noted to be at 62%, there were no regional wall motion abnormalities.    Last Cath: None available in chart      Past Medical History:   Diagnosis Date   • Agent orange exposure    • Anemia    • Anxiety    • Arthritis    • BPH (benign prostatic hyperplasia)    • CAD (coronary artery disease)    • Cheekbone fracture (CMS/HCC)     surgical repair    • Diabetes mellitus (CMS/HCC)    • GERD (gastroesophageal reflux disease)    • H/O foot surgery     left foot orif   • History of hernia surgery     right inguinal   • Pacemaker    • PAF (paroxysmal atrial fibrillation) (CMS/HCC)    • Rotator cuff injury     bilat   • Varicose vein of leg     left lower leg     Past Surgical History:   Procedure Laterality Date   • APPENDECTOMY     • CARDIAC CATHETERIZATION     • CARDIAC SURGERY      pacemaker   • CARDIOVASCULAR STRESS TEST  " 04/21/2014    Dr FLORES EF 58%, pos for ischemia    • CARDIOVASCULAR STRESS TEST  06/26/2018    [facility]- nuclear stress test reported as normal. LVEF 62%   • COLONOSCOPY     • CONVERTED (HISTORICAL) HOLTER  03/04/2015    16 beat run of PAF baseline sinus dago at 50, no symptoms recorded   • CONVERTED (HISTORICAL) HOLTER  07/31/2018    72 hour- baseline sinus- minium 37 bpm and max 96 bpm, 36 pauses noted longest 2.3 sec, 4 beat SVT   • ECHO - CONVERTED  04/16/2014    heart and vascular Dr FLORES EF 65%, mild AR   • ENDOSCOPY     • FOOT SURGERY     • HERNIA REPAIR     • OTHER SURGICAL HISTORY  2015    Ecardio- sinus dago, continue observation   • PACEMAKER IMPLANTATION     • SHOULDER ARTHROSCOPY W/ ROTATOR CUFF REPAIR Right 3/19/2019    Procedure: SHOULDER ARTHROSCOPY WITH  OPEN ROTATOR CUFF REPAIR, ACOMIOPLASTY;  Surgeon: Tylor Berry MD;  Location: [facility];  Service: Orthopedics   • SHOULDER ARTHROSCOPY W/ ROTATOR CUFF REPAIR Left 7/11/2019    Procedure: SHOULDER ARTHROSCOPY WITH MINI OPEN ROTATOR CUFF REPAIR, ACROMIOPLASTY;  Surgeon: Tylor Berry MD;  Location: [facility];  Service: Orthopedics     Family History   Problem Relation Age of Onset   • Lung disease Mother    • Cancer Sister    • Cancer Brother      Social History     Tobacco Use   • Smoking status: Never Smoker   • Smokeless tobacco: Current User     Types: Chew   • Tobacco comment: patient counseled on effectsof tobacco use on health.    Substance Use Topics   • Alcohol use: No   • Drug use: No     Medications Prior to Admission   Medication Sig Dispense Refill Last Dose   • apixaban (ELIQUIS) 5 MG tablet tablet Take 1 tablet by mouth Every 12 (Twelve) Hours. 180 tablet 3 8/12/2019 at AM   • dicyclomine (BENTYL) 20 MG tablet Take 20 mg by mouth 2 (Two) Times a Day.   8/12/2019 at AM   • ferrous sulfate 325 (65 FE) MG tablet Take 325 mg by mouth Daily With Breakfast.   8/12/2019 at Unknown time   • lansoprazole (PREVACID) 30 MG  capsule Take 30 mg by mouth 2 (two) times a day.   8/12/2019 at AM   • metFORMIN (GLUCOPHAGE) 500 MG tablet Take 500 mg by mouth daily with breakfast.   8/12/2019 at Unknown time   • mirtazapine (REMERON) 15 MG tablet Take 15 mg by mouth Every Night.   8/11/2019 at PM   • pregabalin (LYRICA) 150 MG capsule Take 150 mg by mouth 2 (Two) Times a Day.   8/12/2019 at AM   • rOPINIRole (REQUIP) 0.25 MG tablet Take 0.25 mg by mouth 2 (Two) Times a Day. Take 1 hour before bedtime.    8/12/2019 at AM   • alfuzosin (UROXATRAL) 10 MG 24 hr tablet Take 10 mg by mouth Every Night.   8/11/2019 at PM   • zolpidem (AMBIEN) 5 MG tablet Take 5 mg by mouth At Night As Needed for Sleep.   Unknown at Unknown time     Allergies:  Patient has no known allergies.    Review of Systems   Constitutional: Positive for appetite change (decreased). Negative for fatigue.   Respiratory: Negative for shortness of breath.    Cardiovascular: Negative for chest pain, palpitations and leg swelling.   Gastrointestinal: Negative for blood in stool.   Neurological: Positive for dizziness, syncope (near syncope) and weakness (generalized). Negative for light-headedness.   Hematological: Does not bruise/bleed easily.         Objective      Vital Signs  Temp:  [97.9 °F (36.6 °C)-98.1 °F (36.7 °C)] 97.9 °F (36.6 °C)  Heart Rate:  [49-63] 51  Resp:  [18] 18  BP: ()/() 148/75  Body mass index is 22.8 kg/m².    Intake/Output Summary (Last 24 hours) at 8/13/2019 0855  Last data filed at 8/12/2019 2307  Gross per 24 hour   Intake 1000 ml   Output --   Net 1000 ml       Physical Exam   Constitutional: He is oriented to person, place, and time. He appears well-developed and well-nourished.   HENT:   Head: Normocephalic and atraumatic.   Eyes: Pupils are equal, round, and reactive to light.   Neck: No JVD present. Carotid bruit is not present.   Cardiovascular: Regular rhythm and intact distal pulses. Bradycardia present. Exam reveals no gallop and no  friction rub.   No murmur heard.  No lower extremity edema   Pulmonary/Chest: Effort normal and breath sounds normal. No respiratory distress. He has no wheezes. He has no rales.   Abdominal: Soft. He exhibits no mass. There is no tenderness. No hernia.   Neurological: He is alert and oriented to person, place, and time.   Skin: Skin is warm and dry.   Psychiatric: He has a normal mood and affect.   Vitals reviewed.      Telemetry:  Paced 50s    Results Review:   I reviewed the patient's new clinical results.  Results from last 7 days   Lab Units 08/12/19  2232 08/12/19  1949 08/12/19  1713   TROPONIN T ng/mL <0.010 <0.010 <0.010     Results from last 7 days   Lab Units 08/12/19  1713   WBC 10*3/mm3 8.07   HEMOGLOBIN g/dL 11.2*   PLATELETS 10*3/mm3 352     Results from last 7 days   Lab Units 08/12/19  1713   SODIUM mmol/L 140   POTASSIUM mmol/L 3.9   CHLORIDE mmol/L 104   CO2 mmol/L 22.7   BUN mg/dL 17   CREATININE mg/dL 0.93   CALCIUM mg/dL 9.7   GLUCOSE mg/dL 135*   ALT (SGPT) U/L 15   AST (SGOT) U/L 19     Lab Results   Component Value Date    INR 1.47 (H) 08/12/2019    INR 1.1 04/24/2014     No results found for: MG  Lab Results   Component Value Date    TSH 1.280 08/12/2019    CHLPL 181 09/21/2016    TRIG 335 (H) 09/21/2016    HDL 23 (L) 09/21/2016    LDL 91 09/21/2016      No results found for: BNP     EKG:       Imaging Results (last 72 hours)     Procedure Component Value Units Date/Time     Carotid Bilateral [[id number]] Updated:  08/13/19 0822    CT Head Without Contrast [[id number]] Collected:  08/13/19 0812     Updated:  08/13/19 0815    Narrative:       CT HEAD WO CONTRAST-     CLINICAL INDICATION: presyncope        COMPARISON: None available      TECHNIQUE: Axial images of the brain were obtained with out intravenous  contrast.  Reformatted images were created in the sagittal and coronal  planes.     DOSE:     Radiation dose reduction techniques were utilized per ALARA protocol.  Automated exposure  control was initiated through either or [employer] or  [employer] software packages by  protocol.           FINDINGS:   Today's study shows no mass, hemorrhage, or midline shift.   The ventricles, cisterns, and sulci are unremarkable. There is no  hydrocephalus.   There is no evidence of acute ischemia.  I do not see epidural or subdural hematoma.  The gray-white differentiation is appropriate.   The bone window setting images show no destructive calvarial lesion or  acute calvarial fracture.   The posterior fossa is unremarkable.          Impression:       No acute intracranial pathology. Nothing is seen on this exam to  specifically account for the patient's symptoms.     This report was finalized on 8/13/2019 8:13 AM by Dr. Bryce Bianchi MD.       CT Chest Pulmonary Embolism With Contrast [[id number]] Collected:  08/13/19 0100     Updated:  08/13/19 0102    Narrative:       INDICATION:   Elevated d-dimer, generalized weakness    TECHNIQUE:   CT angiogram of the chest with contrast. 3-D reformatted images were acquired.  Radiation dose reduction techniques included automated exposure control or exposure modulation based on body size. Radiation audit for number of CT and nuclear cardiology  exams performed in the last year:  0.      COMPARISON:   None available.    FINDINGS:   There is no dense lung consolidation there is some mild dependent atelectasis. There is no suspicious nodule there is no effusion or pneumothorax. The thoracic aorta is normal in caliber. There is no mediastinal mass. Images of the upper abdomen show no  acute abnormality.    Bolus timing is good, pulmonary arteries are well opacified. There is no convincing evidence of pulmonary embolism.      Impression:       1. Negative for pulmonary embolus.    2. No acute findings in the chest.    Signer Name: Alcides Wilkerson MD   Signed: 8/13/2019 1:00 AM   Workstation Name: [email or web address]    [facility]    XR Chest 2  View [[id number]] Collected:  08/12/19 1758     Updated:  08/12/19 1817    Narrative:       EXAMINATION: XR CHEST 2 VW-      CLINICAL INDICATION: Chest Pain Triage Protocol        COMPARISON: None available      TECHNIQUE: XR CHEST 2 VW-      FINDINGS:   Lungs are adequately aerated.   Heart and mediastinal contours are unremarkable.   No pneumothorax.   Bony and soft tissue structures are unremarkable.  Dual lead cardiac pacing device       Impression:       No radiographic evidence of acute cardiac or pulmonary disease.     This report was finalized on 8/12/2019 5:58 PM by Dr. Bryce Bianchi MD.                Thank you very much for asking us to be involved in this patient's care.  We will follow along with you.    Mere Espinoza, APRN   08/13/19  8:55 AM

## 2019-08-13 NOTE — PLAN OF CARE
Problem: Patient Care Overview  Goal: Plan of Care Review  Outcome: Ongoing (interventions implemented as appropriate)    Goal: Discharge Needs Assessment  Outcome: Ongoing (interventions implemented as appropriate)      Problem: Syncope (Adult)  Goal: Identify Related Risk Factors and Signs and Symptoms  Outcome: Ongoing (interventions implemented as appropriate)    Goal: Physical Safety/Health Maintenance  Outcome: Ongoing (interventions implemented as appropriate)    Goal: Optimal Emotional/Functional Wallace  Outcome: Ongoing (interventions implemented as appropriate)      Problem: Fall Risk (Adult)  Goal: Identify Related Risk Factors and Signs and Symptoms  Outcome: Ongoing (interventions implemented as appropriate)    Goal: Absence of Fall  Outcome: Ongoing (interventions implemented as appropriate)

## 2019-08-13 NOTE — H&P
[facility] HOSPITALIST HISTORY AND PHYSICAL    Patient Identification:  Name:  Pavel Claudio  Age:  73 y.o.  Sex:  male  :  1945  MRN:  [id number]   Visit Number:  [id number]  Room number:  P207/1P  Primary Care Physician:  Jacquelin Turk APRN    Date of Admission: 2019     Subjective     Chief complaint:    Chief Complaint   Patient presents with   • Dizziness   • Weakness - Generalized       History of presenting illness:  73 y.o. male who presented to [facility] emergency department with 2 weeks of presyncope.  Patient previously was diagnosed with sick sinus syndrome and had a pacemaker placed in the 2018 at the [facility] in [place].  Patient states that he had a follow-up appointment with the same cardiologist 3 months prior to admission and at that time his pacemaker was also checked; everything was working fine per the patient's recollection of the visit.  Patient states that for the last 2 weeks he has become lightheaded when he tries to stand up and walk.  As time is went on, the frequency of these episodes has worsened.  He does have nausea with these episodes but denies any chest pain.  He has not completely passed out.  He denies emesis and denies diarrhea.  He does not have any jaw pain but it is difficult for him to tell me if he had any shoulder pain as he had a left rotator cuff repair in 2019 as well as a right rotator cuff repair in 2019.  He has noticed that he has lost weight in the last 2 weeks; he estimates that he has lost around 15 pounds.  He does not have an appetite but he denies any trouble swallowing food or becoming choked on his food.  He has noticed that from the waist down his legs and feet will sweat and freeze at the same time but he denies any leg pain while walking.  He does have a burning sensation in his feet that he states is not due to his diabetes but agent orange.  He has been coughing for 1 week and  producing clear sputum but he denies any shortness of air.  He also denies fevers.  He denies encopresis, dysuria, hematuria, or any other urinary symptoms.  He did see his primary care provider 5 days prior to admission and was given 4 days of doxycycline for possible tick bite that he endured in July 2019.  No blood was collected to test for the tickborne illness at that time.  ---------------------------------------------------------------------------------------------------------------------   Review of Systems   Constitutional: Positive for fatigue and unexpected weight change. Negative for chills and fever.   HENT: Negative for postnasal drip, rhinorrhea, sneezing and sore throat.    Eyes: Negative for discharge and redness.   Respiratory: Negative for cough, shortness of breath and wheezing.    Cardiovascular: Negative for chest pain, palpitations and leg swelling.   Gastrointestinal: Positive for nausea. Negative for diarrhea and vomiting.   Genitourinary: Negative for decreased urine volume, dysuria and hematuria.   Musculoskeletal: Negative for arthralgias and joint swelling.   Skin: Negative for pallor, rash and wound.   Neurological: Positive for light-headedness. Negative for seizures, speech difficulty and headaches.   Hematological: Does not bruise/bleed easily.   Psychiatric/Behavioral: Negative for confusion, self-injury and suicidal ideas. The patient is not nervous/anxious.      ---------------------------------------------------------------------------------------------------------------------   Past Medical History:   Diagnosis Date   • Agent orange exposure    • Anemia    • Anxiety    • Arthritis    • BPH (benign prostatic hyperplasia)    • CAD (coronary artery disease)    • Cheekbone fracture (CMS/HCC)     surgical repair    • Diabetes mellitus (CMS/HCC)    • GERD (gastroesophageal reflux disease)    • H/O foot surgery     left foot orif   • History of hernia surgery     right inguinal   •  Pacemaker    • PAF (paroxysmal atrial fibrillation) (CMS/HCC)    • Rotator cuff injury     bilat   • Varicose vein of leg     left lower leg     Past Surgical History:   Procedure Laterality Date   • APPENDECTOMY     • CARDIAC CATHETERIZATION     • CARDIAC SURGERY      pacemaker   • CARDIOVASCULAR STRESS TEST  04/21/2014    Dr SANDRA GÓMEZ 58%, pos for ischemia    • CARDIOVASCULAR STRESS TEST  06/26/2018    [facility]- nuclear stress test reported as normal. LVEF 62%   • COLONOSCOPY     • CONVERTED (HISTORICAL) HOLTER  03/04/2015    16 beat run of PAF baseline sinus dago at 50, no symptoms recorded   • CONVERTED (HISTORICAL) HOLTER  07/31/2018    72 hour- baseline sinus- minium 37 bpm and max 96 bpm, 36 pauses noted longest 2.3 sec, 4 beat SVT   • ECHO - CONVERTED  04/16/2014    heart and vascular Dr SANDRA GÓMEZ 65%, mild AR   • ENDOSCOPY     • FOOT SURGERY     • HERNIA REPAIR     • OTHER SURGICAL HISTORY  2015    Ecardio- sinus dago, continue observation   • PACEMAKER IMPLANTATION     • SHOULDER ARTHROSCOPY W/ ROTATOR CUFF REPAIR Right 3/19/2019    Procedure: SHOULDER ARTHROSCOPY WITH  OPEN ROTATOR CUFF REPAIR, ACOMIOPLASTY;  Surgeon: Tylor Berry MD;  Location: [facility];  Service: Orthopedics   • SHOULDER ARTHROSCOPY W/ ROTATOR CUFF REPAIR Left 7/11/2019    Procedure: SHOULDER ARTHROSCOPY WITH MINI OPEN ROTATOR CUFF REPAIR, ACROMIOPLASTY;  Surgeon: Tylor Berry MD;  Location: [facility];  Service: Orthopedics     Family History   Problem Relation Age of Onset   • Lung disease Mother    • Cancer Sister    • Cancer Brother      Social History     Socioeconomic History   • Marital status:    Tobacco Use   • Smoking status: Never Smoker   • Smokeless tobacco: Current User     Types: Chew   • Tobacco comment: patient counseled on effectsof tobacco use on health.    Substance and Sexual Activity   • Alcohol use: No   • Drug use: No   • Sexual activity: Defer     Partners: Female     Birth  control/protection: None     ---------------------------------------------------------------------------------------------------------------------   Allergies:  Patient has no known allergies.  ---------------------------------------------------------------------------------------------------------------------   Medications below are reported home medications pulling from within the system; at this time, these medications have not been reconciled unless otherwise specified and are in the verification process for further verifcation as current home medications.    Prior to Admission Medications     Prescriptions Last Dose Informant Patient Reported? Taking?    apixaban (ELIQUIS) 5 MG tablet tablet 8/12/2019 Self No Yes    Take 1 tablet by mouth Every 12 (Twelve) Hours.    dicyclomine (BENTYL) 20 MG tablet 8/12/2019 Self Yes Yes    Take 20 mg by mouth 2 (Two) Times a Day.    ferrous sulfate 325 (65 FE) MG tablet 8/12/2019 Self Yes Yes    Take 325 mg by mouth Daily With Breakfast.    lansoprazole (PREVACID) 30 MG capsule 8/12/2019 Self Yes Yes    Take 30 mg by mouth 2 (two) times a day.    metFORMIN (GLUCOPHAGE) 500 MG tablet 8/12/2019 Self Yes Yes    Take 500 mg by mouth daily with breakfast.    mirtazapine (REMERON) 15 MG tablet 8/11/2019  Yes Yes    Take 15 mg by mouth Every Night.    pregabalin (LYRICA) 150 MG capsule 8/12/2019 Self Yes Yes    Take 150 mg by mouth 2 (Two) Times a Day.    rOPINIRole (REQUIP) 0.25 MG tablet 8/12/2019 Self Yes Yes    Take 0.25 mg by mouth 2 (Two) Times a Day. Take 1 hour before bedtime.     alfuzosin (UROXATRAL) 10 MG 24 hr tablet 8/11/2019 Self Yes No    Take 10 mg by mouth Every Night.    zolpidem (AMBIEN) 5 MG tablet Unknown  Yes No    Take 5 mg by mouth At Night As Needed for Sleep.        Objective     Vital Signs:  Temp:  [97.9 °F (36.6 °C)-98.1 °F (36.7 °C)] 97.9 °F (36.6 °C)  Heart Rate:  [49-63] 51  Resp:  [18] 18  BP: ()/() 148/75    Mean Arterial Pressure  (Non-Invasive) for the past 24 hrs (Last 3 readings):   Noninvasive MAP (mmHg)   08/13/19 0500 111   08/13/19 0400 121   08/13/19 0300 108     SpO2:  [93 %-98 %] 96 %   Device (Oxygen Therapy): room air  Body mass index is 22.8 kg/m².    Wt Readings from Last 3 Encounters:   08/13/19 72.1 kg (158 lb 14.4 oz)   07/17/19 76.8 kg (169 lb 5 oz)   07/11/19 76.8 kg (169 lb 4.8 oz)      ---------------------------------------------------------------------------------------------------------------------   Physical Exam:  Constitutional:  Well-developed and well-nourished.  No respiratory distress.  Thin in appearance.  HENT:  Head: Normocephalic and atraumatic.  Mouth:  Moist mucous membranes.    Eyes:  Conjunctivae and EOM are normal.  Pupils are equal, round, and reactive to light.  No scleral icterus.  Neck:  Neck supple.  No JVD present.    Cardiovascular:  Normal rate, regular rhythm and normal heart sounds with no murmur.  Pulmonary/Chest:  No respiratory distress, no wheezes, no crackles, with normal breath sounds and good air movement.  Abdominal:  Soft.  Bowel sounds are normal.  No distension and no tenderness.   Musculoskeletal:  No edema, no tenderness, and no deformity.  No red or swollen joints anywhere.    Neurological:  Alert and oriented to person, place, and time.  No cranial nerve deficit.  No tongue deviation.  No facial droop.  No slurred speech.   Skin:  Skin is warm and dry.  No rash noted.  No pallor.   Peripheral vascular:  No edema and strong pulses on all 4 extremities.  Genitourinary: No Maurice catheter in place.  ---------------------------------------------------------------------------------------------------------------------  EKG:  Atrial paced per electronic report (but no spikes seen by me) with a heart rate of 50, QTc 437 ms.    Telemetry:  Intermittently atrial paced with heart rates high 40's to the high 50's.    I have personally looked at both the EKG and the telemetry  strips.  --------------------------------------------------------------------------------------------------------------------  Labs:  Results from last 7 days   Lab Units 08/12/19  1713   WBC 10*3/mm3 8.07   HEMOGLOBIN g/dL 11.2*   HEMATOCRIT % 34.9*   MCV fL 87.5   MCHC g/dL 32.1   PLATELETS 10*3/mm3 352   INR  1.47*       Results from last 7 days   Lab Units 08/12/19  1713   SODIUM mmol/L 140   POTASSIUM mmol/L 3.9   CHLORIDE mmol/L 104   CO2 mmol/L 22.7   BUN mg/dL 17   CREATININE mg/dL 0.93   EGFR IF NONAFRICN AM mL/min/1.73 80   CALCIUM mg/dL 9.7   GLUCOSE mg/dL 135*   ALBUMIN g/dL 3.51   BILIRUBIN mg/dL 0.2   ALK PHOS U/L 97   AST (SGOT) U/L 19   ALT (SGPT) U/L 15   Estimated Creatinine Clearance: 72.1 mL/min (by C-G formula based on SCr of 0.93 mg/dL).    Results from last 7 days   Lab Units 08/12/19 2232 08/12/19 1949 08/12/19  1713   TROPONIN T ng/mL <0.010 <0.010 <0.010           I have personally looked at the labs and they are summarized above.  ----------------------------------------------------------------------------------------------------------------------  Detailed radiology reports for the last 24 hours:    Imaging Results (last 24 hours)     Procedure Component Value Units Date/Time    US Carotid Bilateral [[id number]] Updated:  08/13/19 0822    CT Head Without Contrast [[id number]] Collected:  08/13/19 0812     Updated:  08/13/19 0815    Narrative:       CT HEAD WO CONTRAST-     CLINICAL INDICATION: presyncope        COMPARISON: None available      TECHNIQUE: Axial images of the brain were obtained with out intravenous  contrast.  Reformatted images were created in the sagittal and coronal  planes.     DOSE:     Radiation dose reduction techniques were utilized per ALARA protocol.  Automated exposure control was initiated through either or CareDose or  DoseRigWireless Generation software packages by  protocol.           FINDINGS:   Today's study shows no mass, hemorrhage, or midline shift.   The  ventricles, cisterns, and sulci are unremarkable. There is no  hydrocephalus.   There is no evidence of acute ischemia.  I do not see epidural or subdural hematoma.  The gray-white differentiation is appropriate.   The bone window setting images show no destructive calvarial lesion or  acute calvarial fracture.   The posterior fossa is unremarkable.          Impression:       No acute intracranial pathology. Nothing is seen on this exam to  specifically account for the patient's symptoms.     This report was finalized on 8/13/2019 8:13 AM by Dr. Bryce Bianchi MD.       CT Chest Pulmonary Embolism With Contrast [[id number]] Collected:  08/13/19 0100     Updated:  08/13/19 0102    Narrative:       INDICATION:   Elevated d-dimer, generalized weakness    TECHNIQUE:   CT angiogram of the chest with contrast. 3-D reformatted images were acquired.  Radiation dose reduction techniques included automated exposure control or exposure modulation based on body size. Radiation audit for number of CT and nuclear cardiology  exams performed in the last year:  0.      COMPARISON:   None available.    FINDINGS:   There is no dense lung consolidation there is some mild dependent atelectasis. There is no suspicious nodule there is no effusion or pneumothorax. The thoracic aorta is normal in caliber. There is no mediastinal mass. Images of the upper abdomen show no  acute abnormality.    Bolus timing is good, pulmonary arteries are well opacified. There is no convincing evidence of pulmonary embolism.      Impression:       1. Negative for pulmonary embolus.    2. No acute findings in the chest.    Signer Name: Alcides Wilkerson MD   Signed: 8/13/2019 1:00 AM   Workstation Name: [id number]    [facility]    XR Chest 2 View [[id number]] Collected:  08/12/19 7338     Updated:  08/12/19 1817    Narrative:       EXAMINATION: XR CHEST 2 VW-      CLINICAL INDICATION: Chest Pain Triage Protocol        COMPARISON: None  available      TECHNIQUE: XR CHEST 2 VW-      FINDINGS:   Lungs are adequately aerated.   Heart and mediastinal contours are unremarkable.   No pneumothorax.   Bony and soft tissue structures are unremarkable.  Dual lead cardiac pacing device       Impression:       No radiographic evidence of acute cardiac or pulmonary disease.     This report was finalized on 8/12/2019 5:58 PM by Dr. Bryce Bianchi MD.           Final impressions for the last 30 days of radiology reports:    Xr Chest 2 ViewResult Date: 8/12/2019  No radiographic evidence of acute cardiac or pulmonary disease.  This report was finalized on 8/12/2019 5:58 PM by Dr. Bryce Bianchi MD.      Ct Chest Pulmonary Embolism With Contrast    Result Date: 8/13/2019  1. Negative for pulmonary embolus. 2. No acute findings in the chest. Signer Name: Alcides Wilkerson MD  Signed: 8/13/2019 1:00 AM  Workstation Name: [email or web address]  [facility]    I have personally looked at the radiology images and I have read the available final reports.    Assessment & Plan       -Presyncope with bradycardia noted on the EKG despite permanent pacemaker  -SSS and paroxysmal AFib, status post pacemaker placement in the distant past  -Nonobstructive CAD  -Essential hypertension  -Type 2 diabetes mellitus  -Normocytic anemia in a patient with known iron deficiency anemia    [facility]  I have obtained and reviewed records from RA[facility]SA Hook ([facility]) dated 9/19/2018; in them, it mentions that the patient has an [facility], which the patient does have a pacemaker in place currently.  I will consult cardiology as I am not sure if the patient needs to have the rate of the pacemaker increased as he is becoming dizzy when he stands up; when I saw the patient, orthostatic vital signs had not been obtained prior to him receiving IV fluids in the emergency  department.  I have ordered orthostatic blood pressures and heart rates and these were performed.  The patient's heart rate does not rise above 52 and his blood pressures do not drop; again though, these were obtained after he received IV fluids.  I have ordered an echocardiogram, carotid Dopplers, bilateral arterial ultrasounds of the legs.  We will perform serial cardiac enzymes; so far, no evidence of an acute myocardial infarction has been found.  I have ordered a hemoglobin A1c and a TSH level.  For now though, I will just order as needed Accu-Cheks and not give any PRN NovoLog until I know the results of the hemoglobin A1c.  For his hypertension, the patient did have one low blood pressure while in the emergency department.  I reviewed his home medications and I do not see any antihypertensives listed.  Thus, I will monitor his blood pressures for now and await the opinion of cardiology if these need to be treated; I am afraid that if I treat them at this point and he does prove to be orthostatic based on his symptoms and vital signs then I would risk him falling from hypotension given him medicines at this point time.    VTE Prophylaxis:   Mechanical Order History:     None      Pharmalogical Order History:     None        Yosef Gaines MD  [facility] Hospitalist  08/13/19  8:24 AM

## 2019-08-13 NOTE — PROGRESS NOTES
Patient seen and examined, Dr. Gaines notes reviewed, patient was admitted because of increased weakness with activity and near syncope.  Patient has pacemaker placed for sick sinus rhythm approximately a year ago.  Patient reports he has been having poor appetite for the past 2 weeks.  He also had surgery recently for rotator cuff injury on his left shoulder and is currently recovering and getting physical therapy.  Denies dyspnea on exertion or chest pain denies vertigo.  -Near-syncope not sure if this is cardiogenic probably secondary to dehydration  -Dehydration from poor intake  -Physical deconditioning and functional decline  -Paroxysmal atrial fibrillation on chronic anticoagulation  -Sick sinus syndrome status post pacemaker placement  -Diabetes type 2 non-insulin-requiring  -Nonobstructive peripheral arterial disease and coronary artery disease    Follow-up pacemaker interrogation result, continue with IV fluids after bolus, ambulate and monitor pacemaker response.  Fall precaution.

## 2019-08-14 VITALS
RESPIRATION RATE: 22 BRPM | HEART RATE: 61 BPM | DIASTOLIC BLOOD PRESSURE: 70 MMHG | HEIGHT: 70 IN | TEMPERATURE: 98.4 F | OXYGEN SATURATION: 95 % | WEIGHT: 158.9 LBS | SYSTOLIC BLOOD PRESSURE: 138 MMHG | BODY MASS INDEX: 22.75 KG/M2

## 2019-08-14 LAB
B BURGDOR IGG+IGM SER-ACNC: <0.91 ISR (ref 0–0.9)
B BURGDOR IGM SER-ACNC: <0.8 INDEX (ref 0–0.79)

## 2019-08-14 PROCEDURE — 99239 HOSP IP/OBS DSCHRG MGMT >30: CPT | Performed by: HOSPITALIST

## 2019-08-14 PROCEDURE — 99232 SBSQ HOSP IP/OBS MODERATE 35: CPT | Performed by: NURSE PRACTITIONER

## 2019-08-14 RX ADMIN — APIXABAN 5 MG: 5 TABLET, FILM COATED ORAL at 09:04

## 2019-08-14 RX ADMIN — Medication 1 CAPSULE: at 09:04

## 2019-08-14 RX ADMIN — ROPINIROLE HYDROCHLORIDE 0.25 MG: 0.25 TABLET, FILM COATED ORAL at 09:04

## 2019-08-14 RX ADMIN — SODIUM CHLORIDE, PRESERVATIVE FREE 3 ML: 5 INJECTION INTRAVENOUS at 09:03

## 2019-08-14 RX ADMIN — DOXYCYCLINE 100 MG: 100 CAPSULE ORAL at 09:04

## 2019-08-14 RX ADMIN — PREGABALIN 150 MG: 75 CAPSULE ORAL at 09:04

## 2019-08-14 RX ADMIN — PANTOPRAZOLE SODIUM 40 MG: 40 TABLET, DELAYED RELEASE ORAL at 06:32

## 2019-08-14 RX ADMIN — FERROUS SULFATE TAB 325 MG (65 MG ELEMENTAL FE) 325 MG: 325 (65 FE) TAB at 06:32

## 2019-08-14 RX ADMIN — DICYCLOMINE HYDROCHLORIDE 20 MG: 20 TABLET ORAL at 09:04

## 2019-08-14 NOTE — PHARMACY PATIENT ASSISTANCE
Patient is currently on Eliquis. [facility] No other issues identified at this time.     Maranda Stack, Pharmacy Intern  08/14/19  9:49 AM

## 2019-08-14 NOTE — PROGRESS NOTES
LOS: 1 day     Name: Pavel Claudio  Age/Sex: 73 y.o. male  :  1945        PCP: Jacquelin Turk APRN    Principal Problem:    Postural dizziness with presyncope      Admission Information: Pavel Claudio is a 73 y.o. male with a past medical history significant for paroxysmal atrial fibrillation, chronically anticoagulated on Eliquis, sick sinus syndrome, status [facility], non-insulin-dependent type 2 diabetes, agent orange exposure, arthritis, BPH, and anxiety.  He presented to the ED on 2019 with report of 2-week history of worsening dizziness and weakness.  He states that when he gets up to walk he becomes dizzy and feels very weak.  He denies any associated shortness of breath.  He denies any clifford syncopal event.  He states he has never lost consciousness.  He does report associated decreased appetite.  He denies chest pain and palpitations.  In the ED CT of the head without contrast was negative, EKG showed atrial paced rhythm with prolonged AV conduction with a rate of 50.  He was noted to be bradycardic on occasion during his ED stay.  He was admitted for further evaluation and monitoring.  Cardiology was contacted due to bradycardia in the presence of permanent pacemaker.    Chief Complaint: follow up Pacemaker    Interval history: Pacemaker was interrogated yesterday.  Rate was adjusted so that his low rate is now 60 bpm.    Patient was seen and examined.  He states that he is feeling better.  He denies chest pain, he denies dizziness and weakness.  He has been up in his room.    Subjective     Review of Systems   Constitution: Negative for weakness and malaise/fatigue.   Cardiovascular: Negative for chest pain, dyspnea on exertion, irregular heartbeat, leg swelling, near-syncope, orthopnea, palpitations, paroxysmal nocturnal dyspnea and syncope.   Respiratory: Negative for shortness of breath.    Hematologic/Lymphatic: Does not  bruise/bleed easily.   Neurological: Negative for dizziness and light-headedness.       Vital Signs  Vital Signs (last 72 hrs)       08/11 0700  -  08/12 0659 08/12 0700  -  08/13 0659 08/13 0700  -  08/14 0659 08/14 0700  -  08/14 1525   Most Recent    Temp (°F)   97.9 -  98.1    97.8 -  98.2    97.7 -  98.4     98.4 (36.9)    Heart Rate   (!)49 -  63    (!)49 -  61    60 -  72     60    Resp     18    17 -  20    13 -  27     17    BP   (!)82/56 -  176/83    121/71 -  175/95    109/85 -  151/75     138/66    SpO2 (%)   93 -  98    93 -  99    93 -  99     98        Temp:  [97.7 °F (36.5 °C)-98.4 °F (36.9 °C)] 98.4 °F (36.9 °C)  Heart Rate:  [59-72] 60  Resp:  [13-27] 17  BP: (109-175)/(44-96) 138/66  Body mass index is 22.8 kg/m².      Intake/Output Summary (Last 24 hours) at 8/14/2019 1525  Last data filed at 8/14/2019 1403  Gross per 24 hour   Intake 675 ml   Output 925 ml   Net -250 ml       Physical Exam   Constitutional: He is oriented to person, place, and time. He appears well-developed and well-nourished.   Neck: No JVD present. Carotid bruit is not present.   Cardiovascular: Normal rate and regular rhythm.   No lower extremity edema   Pulmonary/Chest: Effort normal and breath sounds normal. No respiratory distress. He has no wheezes. He has no rales.   Neurological: He is alert and oriented to person, place, and time.   Psychiatric: He has a normal mood and affect. Cognition and memory are normal.   Vitals reviewed.      Telemetry: Paced 60s     Results Review:     Results from last 7 days   Lab Units 08/12/19  1713   WBC 10*3/mm3 8.07   HEMOGLOBIN g/dL 11.2*   PLATELETS 10*3/mm3 352     Results from last 7 days   Lab Units 08/12/19  1713   SODIUM mmol/L 140   POTASSIUM mmol/L 3.9   CHLORIDE mmol/L 104   CO2 mmol/L 22.7   BUN mg/dL 17   CREATININE mg/dL 0.93   CALCIUM mg/dL 9.7   GLUCOSE mg/dL 135*     Results from last 7 days   Lab Units 08/12/19 2232 08/12/19  1949 08/12/19  1713   TROPONIN T ng/mL  <0.010 <0.010 <0.010       Results from last 7 days   Lab Units 08/12/19  1713   INR  1.47*       I reviewed the patient's new clinical results.  I reviewed the patient's new imaging results and agree with the interpretation.  I personally viewed and interpreted the patient's EKG/Telemetry data      Medication Review:     apixaban 5 mg Oral Q12H   dicyclomine 20 mg Oral BID   doxycycline 100 mg Oral Q12H   ferrous sulfate 325 mg Oral Daily With Breakfast   lactobacillus acidophilus 1 capsule Oral Daily   mirtazapine 15 mg Oral Nightly   pantoprazole 40 mg Oral QAM   pregabalin 150 mg Oral BID   rOPINIRole 0.25 mg Oral BID   sodium chloride 3 mL Intravenous Q12H   tamsulosin 0.4 mg Oral Nightly       sodium chloride 125 mL/hr Last Rate: 125 mL/hr (08/12/19 2225)       Assessment:  1. Bradycardia, resolved  2. Near syncope  3. Sick sinus syndrome status post permanent pacemaker placement approximately 1 year ago, [facility].  4. Paroxysmal atrial fibrillation CHADsVASc is at least 2 for diabetes and age.  He is anticoagulated on Eliquis.  5. Nonobstructive coronary artery disease  6. Essential hypertension  7. Non-insulin-requiring type 2 diabetes      Recommendations:  1. During his hospital stay Mr. Claudio's pacemaker was interrogated and the rate was adjusted to 60 bpm for the low rate threshhold.  Previously had been at 50 bpm.  2. I have talked with Mr. Claudio regarding [facility] with the [facility].  He has a appointment with them at the end of the month.  I have instructed him as to what we did with his pacemaker during his stay here.  I have told him to discuss this with his [facility] doctor.  3. He is stable from cardiac standpoint and can be discharged.      I discussed the patients findings and my recommendations with patient and family      RAISSA Arnold  08/14/19  3:25 PM    Addendum:

## 2019-08-14 NOTE — PLAN OF CARE
Problem: Patient Care Overview  Goal: Plan of Care Review  Outcome: Ongoing (interventions implemented as appropriate)   08/14/19 0158   Coping/Psychosocial   Plan of Care Reviewed With patient   Plan of Care Review   Progress improving     Goal: Discharge Needs Assessment  Outcome: Ongoing (interventions implemented as appropriate)   08/14/19 0158   Discharge Needs Assessment   Readmission Within the Last 30 Days no previous admission in last 30 days   Concerns to be Addressed denies needs/concerns at this time   Patient/Family Anticipates Transition to home with family   Patient/Family Anticipated Services at Transition nutritionist   Transportation Anticipated family or friend will provide   Equipment Needed After Discharge none   Disability   Equipment Currently Used at Home none       Problem: Syncope (Adult)  Goal: Identify Related Risk Factors and Signs and Symptoms  Outcome: Ongoing (interventions implemented as appropriate)   08/13/19 0233   Syncope (Adult)   Related Risk Factors (Syncope) hypotension;anemia;stress, psychological;stress, environmental;medication effects   Signs and Symptoms (Syncope) dizziness;shortness of breath;temporary loss of consciousness     Goal: Physical Safety/Health Maintenance  Outcome: Ongoing (interventions implemented as appropriate)   08/14/19 0158   Syncope (Adult)   Physical Safety/Health Maintenance making progress toward outcome       Problem: Fall Risk (Adult)  Goal: Identify Related Risk Factors and Signs and Symptoms  Outcome: Ongoing (interventions implemented as appropriate)   08/13/19 0233 08/14/19 0158   Fall Risk (Adult)   Related Risk Factors (Fall Risk) environment unfamiliar;sleep pattern alteration;homeostatic imbalance --    Signs and Symptoms (Fall Risk) --  presence of risk factors     Goal: Absence of Fall  Outcome: Ongoing (interventions implemented as appropriate)   08/14/19 0158   Fall Risk (Adult)   Absence of Fall making progress toward outcome       08/14/19 0158   Fall Risk (Adult)   Absence of Fall making progress toward outcome

## 2019-08-14 NOTE — PLAN OF CARE
Problem: Patient Care Overview  Goal: Plan of Care Review  Outcome: Ongoing (interventions implemented as appropriate)    Goal: Individualization and Mutuality  Outcome: Ongoing (interventions implemented as appropriate)    Goal: Discharge Needs Assessment  Outcome: Ongoing (interventions implemented as appropriate)    Goal: Interprofessional Rounds/Family Conf  Outcome: Ongoing (interventions implemented as appropriate)      Problem: Syncope (Adult)  Goal: Identify Related Risk Factors and Signs and Symptoms  Outcome: Ongoing (interventions implemented as appropriate)    Goal: Physical Safety/Health Maintenance  Outcome: Ongoing (interventions implemented as appropriate)    Goal: Optimal Emotional/Functional Foster  Outcome: Ongoing (interventions implemented as appropriate)      Problem: Fall Risk (Adult)  Goal: Identify Related Risk Factors and Signs and Symptoms  Outcome: Ongoing (interventions implemented as appropriate)    Goal: Absence of Fall  Outcome: Ongoing (interventions implemented as appropriate)

## 2019-08-15 ENCOUNTER — READMISSION MANAGEMENT (OUTPATIENT)
Dept: CALL CENTER | Facility: HOSPITAL | Age: 74
End: 2019-08-15

## 2019-08-15 LAB
A PHAGOCYTOPH IGM TITR SER IF: NEGATIVE {TITER}
B MICROTI IGG TITR SER: NORMAL {TITER}
B MICROTI IGM TITR SER: NORMAL {TITER}
CONV HGE IGG TITER: NEGATIVE
E CHAFFEENSIS IGG TITR SER IF: NEGATIVE {TITER}
E. CHAFFEENSIS (HME) IGM TITER: NEGATIVE
R RICKETTSI IGG SER QL IA: ABNORMAL
R RICKETTSI IGG SER QL IA: POSITIVE
R RICKETTSI IGM TITR SER: 0.4 INDEX (ref 0–0.89)

## 2019-08-15 NOTE — ED PROVIDER NOTES
Subjective   72 yo male presents with compalints of generalized fatigue and feeling bad. Pt says that he has had wt loss and just a tired feeling for several weeks; did have a tick bite in July - has been on doxycyline for 4 days but worsening symptoms. Also reports he gets dizzy and light headed when he stands or walks        History provided by:  Patient and relative  Dizziness   Quality:  Unable to specify  Severity:  Moderate  Onset quality:  Gradual  Timing:  Intermittent  Progression:  Waxing and waning  Chronicity:  New  Context: physical activity and standing up    Relieved by:  Nothing  Worsened by:  Nothing  Ineffective treatments:  None tried  Associated symptoms: nausea and weakness    Associated symptoms: no chest pain        Review of Systems   Constitutional: Negative for fever.   HENT: Negative.    Respiratory: Negative.    Cardiovascular: Negative.  Negative for chest pain.   Gastrointestinal: Positive for nausea. Negative for abdominal pain.   Endocrine: Negative.    Genitourinary: Negative.  Negative for dysuria.   Skin: Negative.    Neurological: Positive for dizziness and weakness.   Psychiatric/Behavioral: Negative.    All other systems reviewed and are negative.      Past Medical History:   Diagnosis Date   • Agent orange exposure    • Anemia    • Anxiety    • Arthritis    • BPH (benign prostatic hyperplasia)    • CAD (coronary artery disease)    • Cheekbone fracture (CMS/HCC)     surgical repair    • Diabetes mellitus (CMS/HCC)    • GERD (gastroesophageal reflux disease)    • H/O foot surgery     left foot orif   • History of hernia surgery     right inguinal   • Pacemaker    • PAF (paroxysmal atrial fibrillation) (CMS/[facility])    • Rotator cuff injury     bilat   • Varicose vein of leg     left lower leg       No Known Allergies    Past Surgical History:   Procedure Laterality Date   • APPENDECTOMY     • CARDIAC CATHETERIZATION     • CARDIAC SURGERY      pacemaker   • CARDIOVASCULAR STRESS TEST   04/21/2014    Dr SANDRA GÓMEZ 58%, pos for ischemia    • CARDIOVASCULAR STRESS TEST  06/26/2018    [facility]- nuclear stress test reported as normal. LVEF 62%   • COLONOSCOPY     • CONVERTED (HISTORICAL) HOLTER  03/04/2015    16 beat run of PAF baseline sinus dago at 50, no symptoms recorded   • CONVERTED (HISTORICAL) HOLTER  07/31/2018    72 hour- baseline sinus- minium 37 bpm and max 96 bpm, 36 pauses noted longest 2.3 sec, 4 beat SVT   • ECHO - CONVERTED  04/16/2014    heart and vascular Dr SANDRA GÓMEZ 65%, mild AR   • ENDOSCOPY     • FOOT SURGERY     • HERNIA REPAIR     • OTHER SURGICAL HISTORY  2015    Ecardio- sinus dago, continue observation   • PACEMAKER IMPLANTATION     • SHOULDER ARTHROSCOPY W/ ROTATOR CUFF REPAIR Right 3/19/2019    Procedure: SHOULDER ARTHROSCOPY WITH  OPEN ROTATOR CUFF REPAIR, ACOMIOPLASTY;  Surgeon: Tylor Berry MD;  Location: [facility];  Service: Orthopedics   • SHOULDER ARTHROSCOPY W/ ROTATOR CUFF REPAIR Left 7/11/2019    Procedure: SHOULDER ARTHROSCOPY WITH MINI OPEN ROTATOR CUFF REPAIR, ACROMIOPLASTY;  Surgeon: Tylor Berry MD;  Location: [facility];  Service: Orthopedics       Family History   Problem Relation Age of Onset   • Lung disease Mother    • Cancer Sister    • Cancer Brother        Social History     Socioeconomic History   • Marital status:      Spouse name: Not on file   • Number of children: Not on file   • Years of education: Not on file   • Highest education level: Not on file   Tobacco Use   • Smoking status: Never Smoker   • Smokeless tobacco: Current User     Types: Chew   • Tobacco comment: patient counseled on effectsof tobacco use on health.    Substance and Sexual Activity   • Alcohol use: No   • Drug use: No   • Sexual activity: Defer     Partners: Female     Birth control/protection: None           Objective   Physical Exam   Constitutional: He is oriented to person, place, and time. He appears well-developed and well-nourished.   HENT:    Head: Normocephalic and atraumatic.   Right Ear: External ear normal.   Left Ear: External ear normal.   Mouth/Throat: Oropharynx is clear and moist.   Eyes: EOM are normal. Pupils are equal, round, and reactive to light.   Neck: Neck supple.   Cardiovascular: Normal rate and regular rhythm.   Pulmonary/Chest: Effort normal and breath sounds normal.   Abdominal: Soft. Bowel sounds are normal.   Musculoskeletal: Normal range of motion.   Neurological: He is alert and oriented to person, place, and time.   Skin: Skin is warm. Capillary refill takes less than 2 seconds.   Psychiatric: He has a normal mood and affect. His behavior is normal. Thought content normal.   Nursing note and vitals reviewed.      Procedures           ED Course                  MDM  Number of Diagnoses or Management Options  Postural dizziness with near syncope: new and requires workup     Amount and/or Complexity of Data Reviewed  Clinical lab tests: ordered and reviewed  Tests in the radiology section of CPT®: ordered and reviewed  Tests in the medicine section of CPT®: reviewed and ordered  Discuss the patient with other providers: yes  Independent visualization of images, tracings, or specimens: yes    Risk of Complications, Morbidity, and/or Mortality  Presenting problems: high  Diagnostic procedures: high  Management options: high    Patient Progress  Patient progress: (guarded)        Final diagnoses:   Postural dizziness with near syncope            Lizette Luu DO  08/14/19 1888

## 2019-08-15 NOTE — DISCHARGE SUMMARY
[facility] HOSPITALIST MEDICINE DISCHARGE SUMMARY    Patient Identification:  Name:  Pavel Claudio  Age:  73 y.o.  Sex:  male  :  1945  MRN:  [id number]  Visit Number:  [id number]    Date of Admission: 2019  Date of Discharge:  8/15/2019   DISCHARGE DISPOSITION   Stable  PCP: Jacquelin Turk APRN    DISCHARGE DIAGNOSIS : Near syncope most likely multifactorial secondary to dehydration and sick sinus syndrome, sick sinus syndrome status post pacemaker placement, BPH, arthritis, history of agent orange exposure, diabetes type 2 non-insulin-requiring, anxiety disorder.  Paroxysmal atrial fibrillation, chronic anticoagulation for stroke prophylaxis, nonobstructive peripheral arterial disease, coronary artery disease, anorexia.  Physical deconditioning and functional decline.    HOSPITAL COURSE  Patient is a 73 y.o. male presented to [facility] complaining of increased dizziness with activity with presyncope.  Preliminary work-up reveals the patient is dehydrated, is also bradycardic, his pacemaker rate is 50 bpm.  Because of this he was admitted.  He was placed on IV hydration, troponins were normal, telemetry shows chronic bradycardia and his pacemaker rhythm is 50 bpm.  Cardiology was consulted.  Pacemaker was interrogated with noting that his pacemaker is functioning properly.  Cardiology did recommend an increase the rate of his pacemaker to 60 bpm.  Bilateral carotid Doppler and bilateral lower extremity arterial Doppler was performed which reveals scattered plaques with no critical stenosis.  After hydration and adjusting his pacemaker his been feeling much better he is ambulating without any difficulties.  Lengthy discussion with the patient together with his family and the wife the importance of increasing p.o. intake and diet.  He will be discharged today and was advised [facility] as recommended by cardiology.       VITAL SIGNS:      08/12/19  1634 08/13/19  0400   Weight: 71.7 kg (158 lb) 72.1 kg (158 lb 14.4 oz)     Body mass index is 22.8 kg/m².  Vitals:    08/14/19 1502   BP: 138/70   Pulse: 61   Resp: 22   Temp:    SpO2: 95%     PHYSICAL EXAM:  General: Comfortable,awake, alert, oriented to self, place, and time, well-developedand well-nourished.  No respiratory distress.    Skin:  Skin is warm and dry. No rash noted. No pallor.    HENT:  Head:  Normocephalic and atraumatic.  Mouth:  Moist mucous membranes.    Eyes:  Conjunctivae and EOM are normal.  Pupils are equal, round, and reactive to light.  No scleral icterus.    Neck:  Neck supple.  No JVD present.  No bruit no hepatojugular reflux  Pulmonary/Chest:  No respiratory distress, no wheezes, no crackles, with normal breath sounds and good air movement.  Left anterior chest wall pacemaker placed  Cardiovascular:  Normal rate, regular rhythm and normal heart sounds with no murmur.  Abdominal:  Soft.  Bowel sounds are normal.  No distension and no tenderness.   Extremities:  No edema, no tenderness, and no deformity.  No red or swollen joints anywhere.  Strong pulses in all 4 extremities with no clubbing, no cyanosis.  Surgical scar noted in both shoulder from previous rotator cuff surgery.  Neurological:  Motor strength equal no obvious deficit, sensory grossly intact.   No cranial nerve deficit.  No tongue deviation.  No facial droop.  No slurred speech.    ----------    DISCHARGE MEDICATIONS:     Discharge Medications      Continue These Medications      Instructions Start Date   alfuzosin 10 MG 24 hr tablet  Commonly known as:  UROXATRAL   10 mg, Oral, Nightly      AMBIEN 5 MG tablet  Generic drug:  zolpidem   5 mg, Oral, Nightly PRN      apixaban 5 MG tablet tablet  Commonly known as:  ELIQUIS   5 mg, Oral, Every 12 Hours Scheduled      dicyclomine 20 MG tablet  Commonly known as:  BENTYL   20 mg, Oral, 2 Times Daily      ferrous sulfate 325 (65 FE) MG tablet    325 mg, Oral, Daily With Breakfast      lansoprazole 30 MG capsule  Commonly known as:  PREVACID   30 mg, Oral, 2 Times Daily      metFORMIN 500 MG tablet  Commonly known as:  GLUCOPHAGE   500 mg, Oral, Daily With Breakfast      mirtazapine 15 MG tablet  Commonly known as:  REMERON   15 mg, Oral, Nightly      pregabalin 150 MG capsule  Commonly known as:  LYRICA   150 mg, Oral, 2 Times Daily      rOPINIRole 0.25 MG tablet  Commonly known as:  REQUIP   0.25 mg, Oral, 2 Times Daily, Take 1 hour before bedtime.               Your Scheduled Appointments    Aug 28, 2019 10:50 AM EDT  Post-Op with Tylor Berry MD  [facility] ORTHOPEDICS (--) [address]  [facility] [address] [phone]          Additional Instructions for the Follow-ups that You Need to Schedule     Discharge Follow-up with PCP   As directed       Currently Documented PCP:    Jacquelin Turk APRN    PCP Phone Number:    [phone]     Follow Up Details:  RAISSA Cho         Discharge Follow-up with Specified Provider: [facility]; 3 Weeks   As directed      To:  [facility]    Follow Up:  3 Weeks           Follow-up Information     Jacquelin Turk APRN Follow up.    Specialty:  Nurse Practitioner  Why:  RAISSA Cho  Contact information:  [address]  [place] [id number] [phone]                   Valerie Allison MD  08/15/19  6:40 PM    Please note that this discharge summary required more than 30 minutes to complete.

## 2019-08-15 NOTE — OUTREACH NOTE
Prep Survey      Responses   Facility patient discharged from?  [facility]   Is patient eligible?  Yes   Discharge diagnosis  Presyncope with bradycardia noted on the EKG despite permanent pacemaker   Does the patient have one of the following disease processes/diagnoses(primary or secondary)?  Other   Does the patient have Home health ordered?  No   Is there a DME ordered?  No   Prep survey completed?  Yes          Natalie Hollins RN

## 2019-08-15 NOTE — PROGRESS NOTES
Discharge Planning Assessment   [facility]     Patient Name: Pavel Claudio  MRN: [id number]  Today's Date: 8/15/2019    Admit Date: 8/12/2019    Discharge Needs Assessment    No documentation.       Discharge Plan     Row Name 08/15/19 0703       Plan    Final Discharge Disposition Code  01 - home or self-care    Final Note  Patient discharged home on 8-14-19.  No needs identified.          Destination      No service coordination in this encounter.      Durable Medical Equipment      No service coordination in this encounter.      Dialysis/Infusion      No service coordination in this encounter.      Home Medical Care      No service coordination in this encounter.      Therapy      No service coordination in this encounter.      Community Resources      No service coordination in this encounter.        Expected Discharge Date and Time     Expected Discharge Date Expected Discharge Time    Aug 14, 2019         Demographic Summary    No documentation.       Functional Status    No documentation.       Psychosocial    No documentation.       Abuse/Neglect    No documentation.       Legal    No documentation.       Substance Abuse    No documentation.       Patient Forms    No documentation.           Bebe Gould RN

## 2019-08-16 ENCOUNTER — READMISSION MANAGEMENT (OUTPATIENT)
Dept: CALL CENTER | Facility: HOSPITAL | Age: 74
End: 2019-08-16

## 2019-08-16 NOTE — OUTREACH NOTE
Medical Week 1 Survey      Responses   Facility patient discharged from?  [facility]   Does the patient have one of the following disease processes/diagnoses(primary or secondary)?  Other   Is there a successful TCM telephone encounter documented?  No   Week 1 attempt successful?  Yes   Call start time  0944   Rescheduled  Rescheduled-pt requested [pt has PT at 8:30a, call after 10:30 at [phone] ]   Person spoke with today (if not patient) and relationship  daughterConnie reviewed with patient/caregiver?  Yes   Is the patient having any side effects they believe may be caused by any medication additions or changes?  No   Does the patient have all medications ordered at discharge?  N/A   Is the patient taking all medications as directed (includes completed medication regime)?  N/A          Verito Miller RN

## 2019-08-19 ENCOUNTER — READMISSION MANAGEMENT (OUTPATIENT)
Dept: CALL CENTER | Facility: HOSPITAL | Age: 74
End: 2019-08-19

## 2019-08-19 NOTE — OUTREACH NOTE
Medical Week 1 Survey      Responses   Facility patient discharged from?  [facility]   Does the patient have one of the following disease processes/diagnoses(primary or secondary)?  Other   Is there a successful TCM telephone encounter documented?  No   Call end time  1344   Meds reviewed with patient/caregiver?  Yes   Is the patient having any side effects they believe may be caused by any medication additions or changes?  No   Does the patient have all medications ordered at discharge?  Yes   Is the patient taking all medications as directed (includes completed medication regime)?  Yes   Medication comments  Pharmacy called said doxycycline has been called in by PCP, he was on it before hospital for tick bites, told to call  and ask.    Does the patient have a primary care provider?   Yes   Does the patient have an appointment with their PCP within 7 days of discharge?  Yes   Comments regarding PCP  Jacquelin Bah   Has the patient kept scheduled appointments due by today?  Yes   Has home health visited the patient within 72 hours of discharge?  N/A   Psychosocial issues?  No   Comments  goes to out patient PT   Did the patient receive a copy of their discharge instructions?  Yes   Nursing interventions  Reviewed instructions with patient   What is the patient's perception of their health status since discharge?  Improving   Is the patient/caregiver able to teach back signs and symptoms related to disease process for when to call PCP?  Yes   Is the patient/caregiver able to teach back signs and symptoms related to disease process for when to call 911?  Yes   Is the patient/caregiver able to teach back the hierarchy of who to call/visit for symptoms/problems? PCP, Specialist, Home health nurse, Urgent Care, ED, 911  Yes   Week 1 call completed?  Yes   Wrap up additional comments  feeling better, no new issues just question why extra dosees of doxicycline started told her to call PCP office and ask          Luisa OROSCO  Jayla RN

## 2019-08-27 ENCOUNTER — READMISSION MANAGEMENT (OUTPATIENT)
Dept: CALL CENTER | Facility: HOSPITAL | Age: 74
End: 2019-08-27

## 2019-08-27 NOTE — OUTREACH NOTE
Medical Week 2 Survey      Responses   Facility patient discharged from?  [facility]   Does the patient have one of the following disease processes/diagnoses(primary or secondary)?  Other   Week 2 attempt successful?  Yes   Call start time  0938   Discharge diagnosis  Presyncope with bradycardia noted on the EKG despite permanent pacemaker   Call end time  0940   Is patient permission given to speak with other caregiver?  Yes   Person spoke with today (if not patient) and relationship  ROSIO Spouse [phone]    Meds reviewed with patient/caregiver?  Yes   Is the patient having any side effects they believe may be caused by any medication additions or changes?  No   Does the patient have all medications ordered at discharge?  Yes   Is the patient taking all medications as directed (includes completed medication regime)?  Yes   Medication comments  No changes.   Does the patient have a primary care provider?   Yes   Does the patient have an appointment with their PCP within 7 days of discharge?  Yes   Has the patient kept scheduled appointments due by today?  Yes   Comments  Seen PCP. Will see ortho tomorrow. Cards in a few wks.   Has home health visited the patient within 72 hours of discharge?  N/A   Psychosocial issues?  No   Did the patient receive a copy of their discharge instructions?  Yes   Nursing interventions  Reviewed instructions with patient   What is the patient's perception of their health status since discharge?  Returned to baseline/stable   Is the patient/caregiver able to teach back signs and symptoms related to disease process for when to call PCP?  Yes   Is the patient/caregiver able to teach back signs and symptoms related to disease process for when to call 911?  Yes   Is the patient/caregiver able to teach back the hierarchy of who to call/visit for symptoms/problems? PCP, Specialist, Home health nurse, Urgent Care, ED, 911  Yes   Week 2 Call Completed?  Yes   Graduated  Yes    Graduated/Revoked comments  Stable. No more cardiac issues since DC. LACE 7.          Juan Francisco Culp RN

## 2019-08-28 ENCOUNTER — OFFICE VISIT (OUTPATIENT)
Dept: ORTHOPEDIC SURGERY | Facility: CLINIC | Age: 74
End: 2019-08-28

## 2019-08-28 VITALS — HEIGHT: 70 IN | BODY MASS INDEX: 21.62 KG/M2 | WEIGHT: 151 LBS

## 2019-08-28 DIAGNOSIS — M25.612 DECREASED ROM OF LEFT SHOULDER: ICD-10-CM

## 2019-08-28 DIAGNOSIS — Z09 POSTOP CHECK: ICD-10-CM

## 2019-08-28 DIAGNOSIS — Z98.890 S/P LEFT ROTATOR CUFF REPAIR: Primary | ICD-10-CM

## 2019-08-28 PROCEDURE — 99024 POSTOP FOLLOW-UP VISIT: CPT | Performed by: ORTHOPAEDIC SURGERY

## 2019-10-21 ENCOUNTER — OFFICE VISIT (OUTPATIENT)
Dept: ORTHOPEDIC SURGERY | Facility: CLINIC | Age: 74
End: 2019-10-21

## 2019-10-21 VITALS — WEIGHT: 151.01 LBS | BODY MASS INDEX: 21.62 KG/M2 | HEIGHT: 70 IN

## 2019-10-21 DIAGNOSIS — S46.012D TRAUMATIC COMPLETE TEAR OF LEFT ROTATOR CUFF, SUBSEQUENT ENCOUNTER: Primary | ICD-10-CM

## 2019-10-21 PROCEDURE — 99212 OFFICE O/P EST SF 10 MIN: CPT | Performed by: ORTHOPAEDIC SURGERY

## 2019-10-21 NOTE — PROGRESS NOTES
Follow-up Visit         Patient: Pavel Claudio  YOB: 1945  Date of Encounter: 10/21/2019      Chief  Complaint:   Chief Complaint   Patient presents with   • Left Shoulder - Follow-up, Post-op     07/11/19 (102d)     Tylor Berry MD      SHOULDER ARTHROSCOPY WITH MINI OPEN ROTATOR CUFF REPAIR, ACROMIOPLASTY - Left             HPI:  Pavel Claudio, 73 y.o. male turns in follow-up left shoulder rotator cuff repair performed July 11 of 2019.  He is overall doing well his pain is almost completely resolved he no longer has night pain and he reports he has good function.    Medical History:  Patient Active Problem List   Diagnosis   • SSS (sick sinus syndrome) (CMS/HCC)   • Diabetes mellitus type 2, controlled, without complications (CMS/HCC)   • Paroxysmal atrial fibrillation (CMS/HCC)   • Medication management   • Bradycardia   • Chewing tobacco use   • Left axis deviation   • Current use of long term anticoagulation   • Complete tear of right rotator cuff   • Osteoarthritis of right AC (acromioclavicular) joint   • Impingement syndrome of right shoulder   • Incomplete tear of left rotator cuff   • Acute pain of left shoulder   • Acute postoperative pain of left shoulder   • Postural dizziness with presyncope     Past Medical History:   Diagnosis Date   • Agent orange exposure    • Anemia    • Anxiety    • Arthritis    • BPH (benign prostatic hyperplasia)    • CAD (coronary artery disease)    • Cheekbone fracture (CMS/HCC)     surgical repair    • Diabetes mellitus (CMS/HCC)    • GERD (gastroesophageal reflux disease)    • H/O foot surgery     left foot orif   • History of hernia surgery     right inguinal   • Hx of Damon Mountain spotted fever    • Pacemaker    • PAF (paroxysmal atrial fibrillation) (CMS/HCC)    • Rotator cuff injury     bilat   • Varicose vein of leg     left lower leg       Social History:  Social History     Socioeconomic History   • Marital status:      Spouse  name: Not on file   • Number of children: Not on file   • Years of education: Not on file   • Highest education level: Not on file   Tobacco Use   • Smoking status: Never Smoker   • Smokeless tobacco: Current User     Types: Chew   • Tobacco comment: patient counseled on effectsof tobacco use on health.    Substance and Sexual Activity   • Alcohol use: No   • Drug use: No   • Sexual activity: Defer     Partners: Female     Birth control/protection: None       Surgical History:  Past Surgical History:   Procedure Laterality Date   • APPENDECTOMY     • CARDIAC CATHETERIZATION     • CARDIAC SURGERY      pacemaker   • CARDIOVASCULAR STRESS TEST  04/21/2014    Dr SANDRA GÓMEZ 58%, pos for ischemia    • CARDIOVASCULAR STRESS TEST  06/26/2018    [facility]- nuclear stress test reported as normal. LVEF 62%   • COLONOSCOPY     • CONVERTED (HISTORICAL) HOLTER  03/04/2015    16 beat run of PAF baseline sinus dago at 50, no symptoms recorded   • CONVERTED (HISTORICAL) HOLTER  07/31/2018    72 hour- baseline sinus- minium 37 bpm and max 96 bpm, 36 pauses noted longest 2.3 sec, 4 beat SVT   • ECHO - CONVERTED  04/16/2014    heart and vascular Dr SANDRA GÓMEZ 65%, mild AR   • ENDOSCOPY     • FOOT SURGERY     • HERNIA REPAIR     • OTHER SURGICAL HISTORY  2015    Ecardio- sinus dago, continue observation   • PACEMAKER IMPLANTATION     • SHOULDER ARTHROSCOPY W/ ROTATOR CUFF REPAIR Right 3/19/2019    Procedure: SHOULDER ARTHROSCOPY WITH  OPEN ROTATOR CUFF REPAIR, ACOMIOPLASTY;  Surgeon: Tylor Berry MD;  Location: [facility];  Service: Orthopedics   • SHOULDER ARTHROSCOPY W/ ROTATOR CUFF REPAIR Left 7/11/2019    Procedure: SHOULDER ARTHROSCOPY WITH MINI OPEN ROTATOR CUFF REPAIR, ACROMIOPLASTY;  Surgeon: Tylor Berry MD;  Location: [facility];  Service: Orthopedics       Examination:   Left shoulder evaluation reveals abduction to 110 degrees he has good strength with internal and external rotation moderate weakness with Jobes  maneuver and mild pain.  Neurovascular examination grossly intact.      Assessment & Plan:   73 y.o. male with continued progression following left shoulder rotator cuff repair acromioplasty.  He will continue with his strengthening exercises at home I think it is reasonable for him to discontinue formal physical therapy.  He will follow-up as needed.         Diagnosis Plan   1. Traumatic complete tear of left rotator cuff, subsequent encounter               Cc:  Jacquelin Turk, RAISSA              This document has been electronically signed by Tylor Berry MD   October 23, 2019 7:46 PM

## 2020-09-15 ENCOUNTER — HOSPITAL ENCOUNTER (INPATIENT)
Facility: HOSPITAL | Age: 75
LOS: 1 days | Discharge: HOME | End: 2020-09-16
Attending: EMERGENCY MEDICINE | Admitting: HOSPITALIST

## 2020-09-15 ENCOUNTER — APPOINTMENT (OUTPATIENT)
Dept: GENERAL RADIOLOGY | Facility: HOSPITAL | Age: 75
End: 2020-09-15

## 2020-09-15 ENCOUNTER — APPOINTMENT (OUTPATIENT)
Dept: CT IMAGING | Facility: HOSPITAL | Age: 75
End: 2020-09-15

## 2020-09-15 DIAGNOSIS — N39.0 URINARY TRACT INFECTION WITH HEMATURIA, SITE UNSPECIFIED: ICD-10-CM

## 2020-09-15 DIAGNOSIS — R55 SYNCOPE, UNSPECIFIED SYNCOPE TYPE: Primary | ICD-10-CM

## 2020-09-15 DIAGNOSIS — R31.9 URINARY TRACT INFECTION WITH HEMATURIA, SITE UNSPECIFIED: ICD-10-CM

## 2020-09-15 DIAGNOSIS — N28.9 RENAL INSUFFICIENCY: ICD-10-CM

## 2020-09-15 DIAGNOSIS — S37.019A PERINEPHRIC HEMATOMA: ICD-10-CM

## 2020-09-15 LAB
ALBUMIN SERPL-MCNC: 4.33 G/DL (ref 3.5–5.2)
ALBUMIN/GLOB SERPL: 1.6 G/DL
ALP SERPL-CCNC: 90 U/L (ref 39–117)
ALT SERPL W P-5'-P-CCNC: 14 U/L (ref 1–41)
ANION GAP SERPL CALCULATED.3IONS-SCNC: 11.3 MMOL/L (ref 5–15)
AST SERPL-CCNC: 19 U/L (ref 1–40)
BACTERIA UR QL AUTO: ABNORMAL /HPF
BASOPHILS # BLD AUTO: 0.05 10*3/MM3 (ref 0–0.2)
BASOPHILS NFR BLD AUTO: 0.4 % (ref 0–1.5)
BILIRUB SERPL-MCNC: 0.4 MG/DL (ref 0–1.2)
BILIRUB UR QL STRIP: NEGATIVE
BUN SERPL-MCNC: 17 MG/DL (ref 8–23)
BUN/CREAT SERPL: 12.4 (ref 7–25)
CALCIUM SPEC-SCNC: 9.6 MG/DL (ref 8.6–10.5)
CHLORIDE SERPL-SCNC: 109 MMOL/L (ref 98–107)
CLARITY UR: ABNORMAL
CO2 SERPL-SCNC: 23.7 MMOL/L (ref 22–29)
COD CRY URNS QL: ABNORMAL /HPF
COLOR UR: ABNORMAL
CREAT SERPL-MCNC: 1.37 MG/DL (ref 0.76–1.27)
CRP SERPL-MCNC: 0.47 MG/DL (ref 0–0.5)
DEPRECATED RDW RBC AUTO: 44.3 FL (ref 37–54)
EOSINOPHIL # BLD AUTO: 0.04 10*3/MM3 (ref 0–0.4)
EOSINOPHIL NFR BLD AUTO: 0.3 % (ref 0.3–6.2)
ERYTHROCYTE [DISTWIDTH] IN BLOOD BY AUTOMATED COUNT: 12.9 % (ref 12.3–15.4)
GFR SERPL CREATININE-BSD FRML MDRD: 51 ML/MIN/1.73
GLOBULIN UR ELPH-MCNC: 2.7 GM/DL
GLUCOSE BLDC GLUCOMTR-MCNC: 228 MG/DL (ref 70–130)
GLUCOSE SERPL-MCNC: 162 MG/DL (ref 65–99)
GLUCOSE UR STRIP-MCNC: NEGATIVE MG/DL
HBA1C MFR BLD: 5.9 % (ref 4.8–5.6)
HCT VFR BLD AUTO: 38.3 % (ref 37.5–51)
HGB BLD-MCNC: 12.1 G/DL (ref 13–17.7)
HGB UR QL STRIP.AUTO: ABNORMAL
HOLD SPECIMEN: NORMAL
HOLD SPECIMEN: NORMAL
HYALINE CASTS UR QL AUTO: ABNORMAL /LPF
IMM GRANULOCYTES # BLD AUTO: 0.05 10*3/MM3 (ref 0–0.05)
IMM GRANULOCYTES NFR BLD AUTO: 0.4 % (ref 0–0.5)
KETONES UR QL STRIP: ABNORMAL
LEUKOCYTE ESTERASE UR QL STRIP.AUTO: ABNORMAL
LYMPHOCYTES # BLD AUTO: 1.06 10*3/MM3 (ref 0.7–3.1)
LYMPHOCYTES NFR BLD AUTO: 8.9 % (ref 19.6–45.3)
MCH RBC QN AUTO: 29.8 PG (ref 26.6–33)
MCHC RBC AUTO-ENTMCNC: 31.6 G/DL (ref 31.5–35.7)
MCV RBC AUTO: 94.3 FL (ref 79–97)
MONOCYTES # BLD AUTO: 1.03 10*3/MM3 (ref 0.1–0.9)
MONOCYTES NFR BLD AUTO: 8.7 % (ref 5–12)
NEUTROPHILS NFR BLD AUTO: 81.3 % (ref 42.7–76)
NEUTROPHILS NFR BLD AUTO: 9.67 10*3/MM3 (ref 1.7–7)
NITRITE UR QL STRIP: POSITIVE
NRBC BLD AUTO-RTO: 0 /100 WBC (ref 0–0.2)
PH UR STRIP.AUTO: <=5 [PH] (ref 5–8)
PLATELET # BLD AUTO: 194 10*3/MM3 (ref 140–450)
PMV BLD AUTO: 10.7 FL (ref 6–12)
POTASSIUM SERPL-SCNC: 4 MMOL/L (ref 3.5–5.2)
PROT SERPL-MCNC: 7 G/DL (ref 6–8.5)
PROT UR QL STRIP: ABNORMAL
RBC # BLD AUTO: 4.06 10*6/MM3 (ref 4.14–5.8)
RBC # UR: ABNORMAL /HPF
REF LAB TEST METHOD: ABNORMAL
SODIUM SERPL-SCNC: 144 MMOL/L (ref 136–145)
SP GR UR STRIP: >1.03 (ref 1–1.03)
SQUAMOUS #/AREA URNS HPF: ABNORMAL /HPF
TROPONIN T SERPL-MCNC: <0.01 NG/ML (ref 0–0.03)
TROPONIN T SERPL-MCNC: <0.01 NG/ML (ref 0–0.03)
TSH SERPL DL<=0.05 MIU/L-ACNC: 1.74 UIU/ML (ref 0.27–4.2)
UROBILINOGEN UR QL STRIP: ABNORMAL
WBC # BLD AUTO: 11.9 10*3/MM3 (ref 3.4–10.8)
WBC UR QL AUTO: ABNORMAL /HPF
WHOLE BLOOD HOLD SPECIMEN: NORMAL
WHOLE BLOOD HOLD SPECIMEN: NORMAL

## 2020-09-15 PROCEDURE — 84484 ASSAY OF TROPONIN QUANT: CPT | Performed by: PHYSICIAN ASSISTANT

## 2020-09-15 PROCEDURE — 71045 X-RAY EXAM CHEST 1 VIEW: CPT | Performed by: RADIOLOGY

## 2020-09-15 PROCEDURE — 82962 GLUCOSE BLOOD TEST: CPT

## 2020-09-15 PROCEDURE — 99223 1ST HOSP IP/OBS HIGH 75: CPT | Performed by: HOSPITALIST

## 2020-09-15 PROCEDURE — 93005 ELECTROCARDIOGRAM TRACING: CPT | Performed by: PHYSICIAN ASSISTANT

## 2020-09-15 PROCEDURE — 25010000002 CEFTRIAXONE PER 250 MG: Performed by: PHYSICIAN ASSISTANT

## 2020-09-15 PROCEDURE — 84484 ASSAY OF TROPONIN QUANT: CPT | Performed by: HOSPITALIST

## 2020-09-15 PROCEDURE — 74176 CT ABD & PELVIS W/O CONTRAST: CPT

## 2020-09-15 PROCEDURE — 80053 COMPREHEN METABOLIC PANEL: CPT | Performed by: PHYSICIAN ASSISTANT

## 2020-09-15 PROCEDURE — 93010 ELECTROCARDIOGRAM REPORT: CPT | Performed by: INTERNAL MEDICINE

## 2020-09-15 PROCEDURE — 87086 URINE CULTURE/COLONY COUNT: CPT | Performed by: PHYSICIAN ASSISTANT

## 2020-09-15 PROCEDURE — 87040 BLOOD CULTURE FOR BACTERIA: CPT | Performed by: PHYSICIAN ASSISTANT

## 2020-09-15 PROCEDURE — 99284 EMERGENCY DEPT VISIT MOD MDM: CPT

## 2020-09-15 PROCEDURE — 93005 ELECTROCARDIOGRAM TRACING: CPT | Performed by: EMERGENCY MEDICINE

## 2020-09-15 PROCEDURE — 71045 X-RAY EXAM CHEST 1 VIEW: CPT

## 2020-09-15 PROCEDURE — 81001 URINALYSIS AUTO W/SCOPE: CPT | Performed by: PHYSICIAN ASSISTANT

## 2020-09-15 PROCEDURE — 84443 ASSAY THYROID STIM HORMONE: CPT | Performed by: PHYSICIAN ASSISTANT

## 2020-09-15 PROCEDURE — 85025 COMPLETE CBC W/AUTO DIFF WBC: CPT | Performed by: PHYSICIAN ASSISTANT

## 2020-09-15 PROCEDURE — 86140 C-REACTIVE PROTEIN: CPT | Performed by: PHYSICIAN ASSISTANT

## 2020-09-15 PROCEDURE — 74176 CT ABD & PELVIS W/O CONTRAST: CPT | Performed by: RADIOLOGY

## 2020-09-15 PROCEDURE — 83036 HEMOGLOBIN GLYCOSYLATED A1C: CPT | Performed by: HOSPITALIST

## 2020-09-15 RX ORDER — SODIUM CHLORIDE 9 MG/ML
100 INJECTION, SOLUTION INTRAVENOUS CONTINUOUS
Status: DISCONTINUED | OUTPATIENT
Start: 2020-09-15 | End: 2020-09-16 | Stop reason: HOSPADM

## 2020-09-15 RX ORDER — NICOTINE POLACRILEX 4 MG
15 LOZENGE BUCCAL
Status: DISCONTINUED | OUTPATIENT
Start: 2020-09-15 | End: 2020-09-16 | Stop reason: HOSPADM

## 2020-09-15 RX ORDER — ASCORBIC ACID 500 MG
500 TABLET ORAL DAILY
COMMUNITY

## 2020-09-15 RX ORDER — DEXTROSE MONOHYDRATE 25 G/50ML
25 INJECTION, SOLUTION INTRAVENOUS
Status: DISCONTINUED | OUTPATIENT
Start: 2020-09-15 | End: 2020-09-16 | Stop reason: HOSPADM

## 2020-09-15 RX ORDER — SODIUM CHLORIDE 0.9 % (FLUSH) 0.9 %
10 SYRINGE (ML) INJECTION AS NEEDED
Status: DISCONTINUED | OUTPATIENT
Start: 2020-09-15 | End: 2020-09-16 | Stop reason: HOSPADM

## 2020-09-15 RX ORDER — SODIUM CHLORIDE 0.9 % (FLUSH) 0.9 %
10 SYRINGE (ML) INJECTION EVERY 12 HOURS SCHEDULED
Status: DISCONTINUED | OUTPATIENT
Start: 2020-09-15 | End: 2020-09-16 | Stop reason: HOSPADM

## 2020-09-15 RX ORDER — SERTRALINE HYDROCHLORIDE 100 MG/1
100 TABLET, FILM COATED ORAL DAILY
COMMUNITY

## 2020-09-15 RX ORDER — NITROGLYCERIN 0.4 MG/1
0.4 TABLET SUBLINGUAL
Status: DISCONTINUED | OUTPATIENT
Start: 2020-09-15 | End: 2020-09-16 | Stop reason: HOSPADM

## 2020-09-15 RX ADMIN — SODIUM CHLORIDE 500 ML: 9 INJECTION, SOLUTION INTRAVENOUS at 15:01

## 2020-09-15 RX ADMIN — SODIUM CHLORIDE 500 ML: 9 INJECTION, SOLUTION INTRAVENOUS at 16:09

## 2020-09-15 RX ADMIN — SODIUM CHLORIDE, PRESERVATIVE FREE 10 ML: 5 INJECTION INTRAVENOUS at 22:09

## 2020-09-15 RX ADMIN — SODIUM CHLORIDE 100 ML/HR: 9 INJECTION, SOLUTION INTRAVENOUS at 22:09

## 2020-09-15 RX ADMIN — CEFTRIAXONE 1 G: 1 INJECTION, POWDER, FOR SOLUTION INTRAMUSCULAR; INTRAVENOUS at 17:39

## 2020-09-15 NOTE — ED PROVIDER NOTES
Subjective   74-year-old white male presents secondary to syncopal episode.  Patient was walking up the steps when he started feeling lightheaded felt as if he could pass out.  He subsequently passed out in a seated position.  He denies any injury.  He states that he is also been having intermittent episodes of lightheadedness over the past 2 weeks.  He is also been having intermittent episodes of right flank pain.  He thought he had had some blood in his urine at times.  He denies any fever.  He denies nausea vomiting.  He denies any preceding chest pain pressure tightness or squeezing.  No new medications.  Patient does have a history of A. fib and is on Eliquis.  No other injuries or complaints.          Review of Systems   Constitutional: Negative.  Negative for fever.   HENT: Negative.    Respiratory: Negative.    Cardiovascular: Negative.  Negative for chest pain.   Gastrointestinal: Negative.  Negative for abdominal pain, nausea and vomiting.   Endocrine: Negative.    Genitourinary: Positive for flank pain and hematuria. Negative for dysuria.   Skin: Negative.    Neurological: Negative.    Psychiatric/Behavioral: Negative.    All other systems reviewed and are negative.      Past Medical History:   Diagnosis Date   • Agent orange exposure    • Anemia    • Anxiety    • Arthritis    • BPH (benign prostatic hyperplasia)    • CAD (coronary artery disease)    • Cheekbone fracture (CMS/HCC)     surgical repair    • Diabetes mellitus (CMS/HCC)    • GERD (gastroesophageal reflux disease)    • H/O foot surgery     left foot orif   • History of hernia surgery     right inguinal   • Hx of Damon Mountain spotted fever    • Pacemaker    • PAF (paroxysmal atrial fibrillation) (CMS/HCC)    • Rotator cuff injury     bilat   • Varicose vein of leg     left lower leg       No Known Allergies    Past Surgical History:   Procedure Laterality Date   • APPENDECTOMY     • CARDIAC CATHETERIZATION     • CARDIAC SURGERY      pacemaker    • CARDIOVASCULAR STRESS TEST  04/21/2014    Dr SANDRA GÓMEZ 58%, pos for ischemia    • CARDIOVASCULAR STRESS TEST  06/26/2018    [facility]- nuclear stress test reported as normal. LVEF 62%   • COLONOSCOPY     • CONVERTED (HISTORICAL) HOLTER  03/04/2015    16 beat run of PAF baseline sinus dago at 50, no symptoms recorded   • CONVERTED (HISTORICAL) HOLTER  07/31/2018    72 hour- baseline sinus- minium 37 bpm and max 96 bpm, 36 pauses noted longest 2.3 sec, 4 beat SVT   • ECHO - CONVERTED  04/16/2014    heart and vascular Dr SANDRA GÓMEZ 65%, mild AR   • ENDOSCOPY     • FOOT SURGERY     • HERNIA REPAIR     • OTHER SURGICAL HISTORY  2015    Ecardio- sinus dago, continue observation   • PACEMAKER IMPLANTATION     • SHOULDER ARTHROSCOPY W/ ROTATOR CUFF REPAIR Right 3/19/2019    Procedure: SHOULDER ARTHROSCOPY WITH  OPEN ROTATOR CUFF REPAIR, ACOMIOPLASTY;  Surgeon: Tylor Berry MD;  Location: [facility];  Service: Orthopedics   • SHOULDER ARTHROSCOPY W/ ROTATOR CUFF REPAIR Left 7/11/2019    Procedure: SHOULDER ARTHROSCOPY WITH MINI OPEN ROTATOR CUFF REPAIR, ACROMIOPLASTY;  Surgeon: Tylor Berry MD;  Location: [facility];  Service: Orthopedics       Family History   Problem Relation Age of Onset   • Lung disease Mother    • Cancer Sister    • Cancer Brother        Social History     Socioeconomic History   • Marital status:      Spouse name: Not on file   • Number of children: Not on file   • Years of education: Not on file   • Highest education level: Not on file   Tobacco Use   • Smoking status: Never Smoker   • Smokeless tobacco: Current User     Types: Chew   • Tobacco comment: patient counseled on effectsof tobacco use on health.    Substance and Sexual Activity   • Alcohol use: No   • Drug use: No   • Sexual activity: Defer     Partners: Female     Birth control/protection: None           Objective   Physical Exam  Vitals signs and nursing note reviewed.   Constitutional:       General: He is not in  acute distress.     Appearance: He is well-developed. He is not diaphoretic.   HENT:      Head: Normocephalic and atraumatic.      Right Ear: External ear normal.      Left Ear: External ear normal.      Nose: Nose normal.   Eyes:      Conjunctiva/sclera: Conjunctivae normal.      Pupils: Pupils are equal, round, and reactive to light.   Neck:      Musculoskeletal: Normal range of motion and neck supple.      Vascular: No JVD.      Trachea: No tracheal deviation.   Cardiovascular:      Rate and Rhythm: Normal rate and regular rhythm.      Heart sounds: Normal heart sounds. No murmur.   Pulmonary:      Effort: Pulmonary effort is normal. No respiratory distress.      Breath sounds: Normal breath sounds. No wheezing.   Abdominal:      General: Bowel sounds are normal.      Palpations: Abdomen is soft.      Tenderness: There is no abdominal tenderness.   Musculoskeletal: Normal range of motion.         General: No deformity.   Skin:     General: Skin is warm and dry.      Coloration: Skin is not pale.      Findings: No erythema or rash.   Neurological:      Mental Status: He is alert and oriented to person, place, and time.      Cranial Nerves: No cranial nerve deficit.   Psychiatric:         Behavior: Behavior normal.         Thought Content: Thought content normal.         Procedures           ED Course  ED Course as of Sep 15 2104   Tue Sep 15, 2020   1301 Paced rhythm.  QTC of 442.  CO interval of 218.  QRS of 104.  Nonspecific ST changes per Dr. Shelley    [JI]   1745 D/w Dr Lowe- will consult.    [JI]   3335 Paged hospitalist    [JI]   1954 Paged Dr. Roldan    [JI]      ED Course User Index  [JI] Clifton Bose, PA                                           MDM  Number of Diagnoses or Management Options  Perinephric hematoma: new and requires workup  Renal insufficiency: new and requires workup  Syncope, unspecified syncope type: new and requires workup  Urinary tract infection with hematuria, site  unspecified: new and requires workup     Amount and/or Complexity of Data Reviewed  Clinical lab tests: reviewed and ordered  Tests in the radiology section of CPT®: reviewed and ordered  Tests in the medicine section of CPT®: reviewed and ordered  Independent visualization of images, tracings, or specimens: yes        Final diagnoses:   Syncope, unspecified syncope type   Perinephric hematoma   Urinary tract infection with hematuria, site unspecified   Renal insufficiency            Clifton Bose PA  09/15/20 [id number]

## 2020-09-15 NOTE — ED NOTES
Pt resting on stretcher; NAD noted; respirations even and unlabored; alert and oriented X4; updated on plan of care; VSS; will continue to monitor     Luciano Pathak, RN  09/15/20 1938

## 2020-09-15 NOTE — ED NOTES
Orthostatics  1459 Lying 112/61 HR 60  1502 Sitting 101/61 HR 62  1505 Standing  93/57 HR 66     Provider made aware.     Alyssia Anderson, RN  09/15/20 1506       Alyssia Anderson, TIM  09/15/20 1605

## 2020-09-16 ENCOUNTER — APPOINTMENT (OUTPATIENT)
Dept: CT IMAGING | Facility: HOSPITAL | Age: 75
End: 2020-09-16

## 2020-09-16 ENCOUNTER — APPOINTMENT (OUTPATIENT)
Dept: CARDIOLOGY | Facility: HOSPITAL | Age: 75
End: 2020-09-16

## 2020-09-16 ENCOUNTER — APPOINTMENT (OUTPATIENT)
Dept: ULTRASOUND IMAGING | Facility: HOSPITAL | Age: 75
End: 2020-09-16

## 2020-09-16 VITALS
DIASTOLIC BLOOD PRESSURE: 79 MMHG | OXYGEN SATURATION: 96 % | RESPIRATION RATE: 18 BRPM | SYSTOLIC BLOOD PRESSURE: 148 MMHG | BODY MASS INDEX: 23.37 KG/M2 | WEIGHT: 163.2 LBS | TEMPERATURE: 98.9 F | HEIGHT: 70 IN | HEART RATE: 73 BPM

## 2020-09-16 LAB
ALBUMIN SERPL-MCNC: 3.9 G/DL (ref 3.5–5.2)
ALBUMIN/GLOB SERPL: 1.6 G/DL
ALP SERPL-CCNC: 80 U/L (ref 39–117)
ALT SERPL W P-5'-P-CCNC: 13 U/L (ref 1–41)
ANION GAP SERPL CALCULATED.3IONS-SCNC: 10.4 MMOL/L (ref 5–15)
AST SERPL-CCNC: 17 U/L (ref 1–40)
BACTERIA SPEC AEROBE CULT: NO GROWTH
BASOPHILS # BLD AUTO: 0.02 10*3/MM3 (ref 0–0.2)
BASOPHILS NFR BLD AUTO: 0.2 % (ref 0–1.5)
BILIRUB SERPL-MCNC: 0.3 MG/DL (ref 0–1.2)
BUN SERPL-MCNC: 17 MG/DL (ref 8–23)
BUN/CREAT SERPL: 13.9 (ref 7–25)
CALCIUM SPEC-SCNC: 8.8 MG/DL (ref 8.6–10.5)
CHLORIDE SERPL-SCNC: 109 MMOL/L (ref 98–107)
CO2 SERPL-SCNC: 23.6 MMOL/L (ref 22–29)
CREAT SERPL-MCNC: 1.22 MG/DL (ref 0.76–1.27)
CRP SERPL-MCNC: 1.11 MG/DL (ref 0–0.5)
DEPRECATED RDW RBC AUTO: 45.5 FL (ref 37–54)
EOSINOPHIL # BLD AUTO: 0.08 10*3/MM3 (ref 0–0.4)
EOSINOPHIL NFR BLD AUTO: 0.8 % (ref 0.3–6.2)
ERYTHROCYTE [DISTWIDTH] IN BLOOD BY AUTOMATED COUNT: 13.1 % (ref 12.3–15.4)
GFR SERPL CREATININE-BSD FRML MDRD: 58 ML/MIN/1.73
GLOBULIN UR ELPH-MCNC: 2.4 GM/DL
GLUCOSE BLDC GLUCOMTR-MCNC: 104 MG/DL (ref 70–130)
GLUCOSE BLDC GLUCOMTR-MCNC: 107 MG/DL (ref 70–130)
GLUCOSE BLDC GLUCOMTR-MCNC: 110 MG/DL (ref 70–130)
GLUCOSE SERPL-MCNC: 217 MG/DL (ref 65–99)
HCT VFR BLD AUTO: 35.6 % (ref 37.5–51)
HGB BLD-MCNC: 11 G/DL (ref 13–17.7)
IMM GRANULOCYTES # BLD AUTO: 0.05 10*3/MM3 (ref 0–0.05)
IMM GRANULOCYTES NFR BLD AUTO: 0.5 % (ref 0–0.5)
LYMPHOCYTES # BLD AUTO: 1.11 10*3/MM3 (ref 0.7–3.1)
LYMPHOCYTES NFR BLD AUTO: 11.7 % (ref 19.6–45.3)
MCH RBC QN AUTO: 29.6 PG (ref 26.6–33)
MCHC RBC AUTO-ENTMCNC: 30.9 G/DL (ref 31.5–35.7)
MCV RBC AUTO: 96 FL (ref 79–97)
MONOCYTES # BLD AUTO: 0.84 10*3/MM3 (ref 0.1–0.9)
MONOCYTES NFR BLD AUTO: 8.9 % (ref 5–12)
NEUTROPHILS NFR BLD AUTO: 7.36 10*3/MM3 (ref 1.7–7)
NEUTROPHILS NFR BLD AUTO: 77.9 % (ref 42.7–76)
NRBC BLD AUTO-RTO: 0 /100 WBC (ref 0–0.2)
PLATELET # BLD AUTO: 186 10*3/MM3 (ref 140–450)
PMV BLD AUTO: 11.5 FL (ref 6–12)
POTASSIUM SERPL-SCNC: 4 MMOL/L (ref 3.5–5.2)
PROT SERPL-MCNC: 6.3 G/DL (ref 6–8.5)
RBC # BLD AUTO: 3.71 10*6/MM3 (ref 4.14–5.8)
SODIUM SERPL-SCNC: 143 MMOL/L (ref 136–145)
TROPONIN T SERPL-MCNC: <0.01 NG/ML (ref 0–0.03)
WBC # BLD AUTO: 9.46 10*3/MM3 (ref 3.4–10.8)

## 2020-09-16 PROCEDURE — 93306 TTE W/DOPPLER COMPLETE: CPT

## 2020-09-16 PROCEDURE — 72192 CT PELVIS W/O DYE: CPT

## 2020-09-16 PROCEDURE — 86140 C-REACTIVE PROTEIN: CPT | Performed by: HOSPITALIST

## 2020-09-16 PROCEDURE — 80053 COMPREHEN METABOLIC PANEL: CPT | Performed by: HOSPITALIST

## 2020-09-16 PROCEDURE — 82962 GLUCOSE BLOOD TEST: CPT

## 2020-09-16 PROCEDURE — 93306 TTE W/DOPPLER COMPLETE: CPT | Performed by: INTERNAL MEDICINE

## 2020-09-16 PROCEDURE — 84484 ASSAY OF TROPONIN QUANT: CPT | Performed by: HOSPITALIST

## 2020-09-16 PROCEDURE — 85025 COMPLETE CBC W/AUTO DIFF WBC: CPT | Performed by: HOSPITALIST

## 2020-09-16 PROCEDURE — 93880 EXTRACRANIAL BILAT STUDY: CPT | Performed by: RADIOLOGY

## 2020-09-16 PROCEDURE — 99238 HOSP IP/OBS DSCHRG MGMT 30/<: CPT | Performed by: INTERNAL MEDICINE

## 2020-09-16 PROCEDURE — 99222 1ST HOSP IP/OBS MODERATE 55: CPT | Performed by: NURSE PRACTITIONER

## 2020-09-16 PROCEDURE — 72192 CT PELVIS W/O DYE: CPT | Performed by: RADIOLOGY

## 2020-09-16 PROCEDURE — 93880 EXTRACRANIAL BILAT STUDY: CPT

## 2020-09-16 PROCEDURE — 99222 1ST HOSP IP/OBS MODERATE 55: CPT | Performed by: UROLOGY

## 2020-09-16 PROCEDURE — 93288 INTERROG EVL PM/LDLS PM IP: CPT

## 2020-09-16 RX ORDER — ZOLPIDEM TARTRATE 5 MG/1
5 TABLET ORAL NIGHTLY PRN
Status: DISCONTINUED | OUTPATIENT
Start: 2020-09-16 | End: 2020-09-16 | Stop reason: HOSPADM

## 2020-09-16 RX ORDER — PREGABALIN 75 MG/1
150 CAPSULE ORAL 2 TIMES DAILY
Status: DISCONTINUED | OUTPATIENT
Start: 2020-09-16 | End: 2020-09-16 | Stop reason: HOSPADM

## 2020-09-16 RX ORDER — ASCORBIC ACID 500 MG
500 TABLET ORAL DAILY
Status: DISCONTINUED | OUTPATIENT
Start: 2020-09-16 | End: 2020-09-16 | Stop reason: HOSPADM

## 2020-09-16 RX ORDER — ROPINIROLE 0.25 MG/1
0.75 TABLET, FILM COATED ORAL 2 TIMES DAILY
Status: DISCONTINUED | OUTPATIENT
Start: 2020-09-16 | End: 2020-09-16

## 2020-09-16 RX ORDER — FERROUS SULFATE 325(65) MG
325 TABLET ORAL
Status: DISCONTINUED | OUTPATIENT
Start: 2020-09-16 | End: 2020-09-16 | Stop reason: HOSPADM

## 2020-09-16 RX ORDER — PANTOPRAZOLE SODIUM 40 MG/1
40 TABLET, DELAYED RELEASE ORAL EVERY MORNING
Status: DISCONTINUED | OUTPATIENT
Start: 2020-09-16 | End: 2020-09-16 | Stop reason: HOSPADM

## 2020-09-16 RX ORDER — TAMSULOSIN HYDROCHLORIDE 0.4 MG/1
0.4 CAPSULE ORAL NIGHTLY
Status: DISCONTINUED | OUTPATIENT
Start: 2020-09-16 | End: 2020-09-16 | Stop reason: HOSPADM

## 2020-09-16 RX ORDER — DICYCLOMINE HCL 20 MG
20 TABLET ORAL 4 TIMES DAILY PRN
Status: DISCONTINUED | OUTPATIENT
Start: 2020-09-16 | End: 2020-09-16 | Stop reason: HOSPADM

## 2020-09-16 RX ADMIN — OXYCODONE HYDROCHLORIDE AND ACETAMINOPHEN 500 MG: 500 TABLET ORAL at 10:58

## 2020-09-16 RX ADMIN — SODIUM CHLORIDE 100 ML/HR: 9 INJECTION, SOLUTION INTRAVENOUS at 10:58

## 2020-09-16 RX ADMIN — SERTRALINE HYDROCHLORIDE 100 MG: 50 TABLET, FILM COATED ORAL at 10:57

## 2020-09-16 RX ADMIN — PANTOPRAZOLE SODIUM 40 MG: 40 TABLET, DELAYED RELEASE ORAL at 10:58

## 2020-09-16 RX ADMIN — FERROUS SULFATE TAB 325 MG (65 MG ELEMENTAL FE) 325 MG: 325 (65 FE) TAB at 10:57

## 2020-09-16 RX ADMIN — PREGABALIN 150 MG: 75 CAPSULE ORAL at 10:57

## 2020-09-16 RX ADMIN — ROPINIROLE HYDROCHLORIDE 0.75 MG: 0.25 TABLET, FILM COATED ORAL at 10:57

## 2020-09-16 NOTE — PLAN OF CARE
Goal Outcome Evaluation:   Pt made comfortable in bed from ER. A&Ox4. VS stable. NPO after midnight for cardiology and urology consult this am. No acute distress or complaints noted. Will continue to monitor and follow plan of care.

## 2020-09-16 NOTE — DISCHARGE SUMMARY
"    [facility]  DISCHARGE SUMMARY    Patient Identification:  Name:  Pavel Claudio  Age:  74 y.o.  Sex:  male  :  1945  MRN:  [id number]  Visit Number:  [id number]    Date of Admission: 9/15/2020  Date of Discharge:  2020    PCP: Jacquelin Turk APRN      Admission/Discharge Diagnoses     Discharge Diagnoses:  · Syncope due to dehydration and hypotension  · Hypotension due to dehydration  · Dehydration  · Large right-sided perinephric hematoma due to anticoagulation complications  · BECKIE due to dehydration    Consults/Procedures     Consults:   Consults     Date and Time Order Name Status Description    9/15/2020 2201 Inpatient Cardiology Consult      9/15/2020 2139 Inpatient Urology Consult            Procedures Performed:  none    History of Presenting Illness     \"Patient is a 74 y.o. male with history of CAD, BPH, Type II DM, GERD, paroxysmal afib s/p pacemaker placement, arthritis and anemia who presents with complaints of suffering a syncopal episode earlier today. He reports he has been experiencing dizziness with standing and with exertion for the last couple weeks. He denies dizziness at rest. He denies chest pain, dizziness, nausea or vomiting. During this period of time over the last few weeks, he also has experienced right flank pain radiating to his right groin and suprapubic region. He has also noticed blood in his urine recently. He denies cloudy or foul smelling urine. Today, he was walking up a flight of stairs when he became lightheaded and sat down in a chair to rest. Shortly after sitting down he lost consciousness, slumped over in his kacie and then fell to the ground. He regained consciousness just a few seconds later. \"    Hospital Course     Patient was admitted due to hypotension and syncope.  Received IV fluid with improvement in blood pressure.  Orthostatic vital signs were negative afterwards.  He no longer has any dizziness " upon standing up.  Pacemaker was interrogated and noted episodic atrial tachycardia but no other arrhythmias noted.  He reports recent heart catheterization in the past 5 years of with no evidence of obstruction however I do not have any records of those reports.  [facility] Dr. Veloz.  He had bilateral carotid ultrasounds in 2019 that showed no significant stenosis.  The studies were ordered again this admission however not completed yet and unlikely to show any significant changes.  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  Patient has similar presentation last year in June 2019.  At that time he only had presyncopal episode which was found to have hypotension as well that resolved with IV fluid.  He does have a blood pressure monitor at home that does not use frequently.  He was instructed to use it on a daily basis and if noticed lower blood pressure than normal to increase his hydration.   Cardiology was consulted during this admission however given patient reporting recent heart catheterization with nonobstructive coronary artery disease and hypotension likely explaining syncope they decided not to pursue stress test at this time.  Highly recommend for patient to follow-up with [facility] own [facility] soon.    He was also found to have a right renal hematoma on CT.  Patient has been having flank pain for the past few days.  Urology was consulted and recommending to hold anticoagulation for now until follow-up in clinic in 2 weeks.  At that time we will obtain an MRI without contrast to further evaluate.  Patient was taking apixaban for A. Fib.    Of note patient has been complaining of bilateral hip pain.  He is pointing into anterior groin reports pain radiates to his back.  CT pelvis did not show any evidence of hernia or fractures.  Based on exam and clinical  history patient likely is suffering from arthritis.  Discussed using glucosamine over-the-counter however to discuss with PCP first.    Discharge Vitals/Physical Examination     Vital Signs:  Temp:  [97.3 °F (36.3 °C)-99.1 °F (37.3 °C)] 98.9 °F (37.2 °C)  Heart Rate:  [60-73] 73  Resp:  [17-20] 18  BP: (105-214)/() 148/79  Mean Arterial Pressure (Non-Invasive) for the past 24 hrs (Last 3 readings):   Noninvasive MAP (mmHg)   09/16/20 1517 107   09/16/20 1032 81   09/16/20 1030 81     SpO2 Percentage    09/16/20 1030 09/16/20 1032 09/16/20 1517   SpO2: 95% 95% 96%     SpO2:  [95 %-99 %] 96 %  on   ;   Device (Oxygen Therapy): room air    Body mass index is 23.42 kg/m².  Wt Readings from Last 3 Encounters:   09/15/20 74 kg (163 lb 3.2 oz)   10/21/19 68.5 kg (151 lb 0.2 oz)   08/28/19 68.5 kg (151 lb)         Physical Exam:  GEN: NAD  CV: RRR, no audible murmur  PULM: CTA, no wheeze or crackle     Pertinent Laboratory/Radiology Results     Pertinent Laboratory Results:  Results from last 7 days   Lab Units 09/16/20  0423 09/15/20  2128 09/15/20  1458   TROPONIN T ng/mL <0.010 <0.010 <0.010           Results from last 7 days   Lab Units 09/16/20  0018 09/15/20  1458   CRP mg/dL 1.11* 0.47   WBC 10*3/mm3 9.46 11.90*   HEMOGLOBIN g/dL 11.0* 12.1*   HEMATOCRIT % 35.6* 38.3   MCV fL 96.0 94.3   MCHC g/dL 30.9* 31.6   PLATELETS 10*3/mm3 186 194     Results from last 7 days   Lab Units 09/16/20  0018 09/15/20  1458   SODIUM mmol/L 143 144   POTASSIUM mmol/L 4.0 4.0   CHLORIDE mmol/L 109* 109*   CO2 mmol/L 23.6 23.7   BUN mg/dL 17 17   CREATININE mg/dL 1.22 1.37*   EGFR IF NONAFRICN AM mL/min/1.73 58* 51*   CALCIUM mg/dL 8.8 9.6   GLUCOSE mg/dL 217* 162*   ALBUMIN g/dL 3.90 4.33   BILIRUBIN mg/dL 0.3 0.4   ALK PHOS U/L 80 90   AST (SGOT) U/L 17 19   ALT (SGPT) U/L 13 14   Estimated Creatinine Clearance: 55.6 mL/min (by C-G formula based on SCr of 1.22 mg/dL).  No results found for: AMMONIA    Hemoglobin A1C   Date/Time  Value Ref Range Status   09/15/2020 1458 5.90 (H) 4.80 - 5.60 % Final     Glucose   Date/Time Value Ref Range Status   09/16/2020 1023 104 70 - 130 mg/dL Final   09/16/2020 0629 107 70 - 130 mg/dL Final   09/15/2020 2208 228 (H) 70 - 130 mg/dL Final     Lab Results   Component Value Date    HGBA1C 5.90 (H) 09/15/2020     Lab Results   Component Value Date    TSH 1.740 09/15/2020       Blood Culture   Date Value Ref Range Status   09/15/2020 No growth at 24 hours  Preliminary     Urine Culture   Date Value Ref Range Status   09/15/2020 No growth  Preliminary     Microbiology Results (last 10 days)     Procedure Component Value - Date/Time    Blood Culture - Blood, Arm, Left [[id number]] Collected: 09/15/20 1616    Lab Status: Preliminary result Specimen: Blood from Arm, Left Updated: 09/16/20 1630     Blood Culture No growth at 24 hours    Blood Culture - Blood, Arm, Left [[id number]] Collected: 09/15/20 1552    Lab Status: Preliminary result Specimen: Blood from Arm, Left Updated: 09/16/20 1615     Blood Culture No growth at 24 hours    Urine Culture - Urine, Urine, Clean Catch [[id number]]  (Normal) Collected: 09/15/20 1524    Lab Status: Preliminary result Specimen: Urine, Clean Catch Updated: 09/16/20 1142     Urine Culture No growth            Pertinent Radiology Results:  Imaging Results (All)     Procedure Component Value Units Date/Time    CT Pelvis Without Contrast [[id number]] Collected: 09/16/20 1533     Updated: 09/16/20 1533    Narrative:      EXAMINATION: CT PELVIS WO CONTRAST-      CLINICAL INDICATION: Pelvic fracture, follow up; R55-Syncope and  collapse; S37.019A-Minor contusion of unspecified kidney, initial  encounter; N39.0-Urinary tract infection, site not specified;  R31.9-Hematuria, unspecified; N28.9-Disorder of kidney and ureter,  unspecified        COMPARISON: Abdomen and pelvis CT 09/15/2020     Radiation dose reduction techniques were utilized per ALARA protocol.  Automated exposure control  was initiated through either or [employer] or  [employer] software packages by  protocol.        PROCEDURE: Axial images through the pelvis were acquired without any IV  contrast. Reformatted images were created.     FINDINGS: Stranding inferior to the right kidney and small amount of  free fluid in the right lower quadrant, similar to the prior study most  likely related to renal abnormalities as previously described.     No acute pelvic fracture is identified.       Impression:      1. Stranding and fluid posterior and inferior to the right kidney along  the right psoas muscle.  2. No acute fracture is seen.        CT Abdomen Pelvis Stone Protocol [[id number]] Collected: 09/15/20 1740     Updated: 09/15/20 1954    Narrative:      EXAM: CT ABDOMEN PELVIS STONE PROTOCOL-            TECHNIQUE: Multiple axial CT images were obtained from lung bases  through pubic symphysis WITHOUT administration of IV contrast.  Reformatted images in the coronal and/or sagittal plane(s) were  generated from the axial data set to facilitate diagnostic accuracy  and/or surgical planning.  Oral Contrast:NONE.     Radiation dose reduction techniques were utilized per ALARA protocol.  Automated exposure control was initiated through either or [employer] or  [employer] software packages by  protocol.       DOSE: 901.19 mGy.cm     Clinical information  Flank pain, stone disease suspected      Comparison  NONE.     FINDINGS:     Lower thorax:   Clear. No effusions.     Abdomen:     Liver:   Homogeneous. No focal hepatic mass or ductal dilatation.     Gallbladder:   Stone in the cystic duct     Pancreas:   Unremarkable. No mass or ductal dilatation.     Spleen:   Homogeneous. No splenomegaly.     Adrenals:   No mass.     Kidneys/ureters:   Extensive perinephric stranding on the right. Mixed density collection  intimate with the posterior cortex of the right kidney most likely  representing a large right-sided renal hematoma.      Nonobstructing left intrarenal stone is present.     GI tract:   Non-dilated. No definite wall thickening.     Peritoneum:   No free air. No free fluid or loculated fluid collections.     Mesentery:   Unremarkable.     Lymph nodes:   No lymphadenopathy.     Vasculature:   No evidence of aneurysm.     Abdominal wall:   No focal hernia or mass.        Other: None.     Pelvis:     Bladder:   No focal mass or significant wall thickening     Reproductive:   Unremarkable as visualized.     Appendix:   No evidence of appendicitis     Bones:   No acute bony abnormality.       Impression:         1. Large mixed attenuation collection intimate with the posterior  inferior aspect of the right kidney compatible with large right-sided  renal hematoma. Extensive perinephric stranding on the right. No  hydronephrosis.     2.Cholelithiasis  3. Nonobstructing left intrarenal stone     I have discussed the results with the emergency department              This report was finalized on 9/15/2020 5:43 PM by Dr. Bryce Bianchi MD.       XR Chest 1 View [[id number]] Collected: 09/15/20 1527     Updated: 09/15/20 1553    Narrative:      XR CHEST 1 VW-     CLINICAL INDICATION: syncope        COMPARISON: 08/12/2019      TECHNIQUE: Single frontal view of the chest.     FINDINGS:     There is no focal alveolar infiltrate or effusion.  The cardiac silhouette is normal. The pulmonary vasculature is  unremarkable.  There is no evidence of an acute osseous abnormality.   There are no suspicious-appearing parenchymal soft tissue nodules.          Impression:      No evidence of active or acute cardiopulmonary disease on today's chest  radiograph.     This report was finalized on 9/15/2020 3:27 PM by Dr. Bryce Bianchi MD.             Test Results Pending at Discharge:  Pending Labs     Order Current Status    Blood Culture - Blood, Arm, Left Preliminary result    Blood Culture - Blood, Arm, Left Preliminary result    Urine Culture - Urine, Urine,  Clean Catch Preliminary result          Discharge Disposition/Discharge Medications/Discharge Appointments     Discharge Disposition:   Home or Self Care    Condition at Discharge:  Stable     DME Prescribed at Discharge:  None    Discharge Diet:  regular    Discharge Activity:  as tolerated    Code Status While Inpatient:  Code Status and Medical Interventions:   Ordered at: 09/15/20 2049     Level Of Support Discussed With:    Patient     Code Status:    CPR     Medical Interventions (Level of Support Prior to Arrest):    Full       Discharge Medications:     Discharge Medications      Continue These Medications      Instructions Start Date   alfuzosin 10 MG 24 hr tablet  Commonly known as: UROXATRAL   10 mg, Oral, Daily      Ambien 5 MG tablet  Generic drug: zolpidem   5 mg, Oral, Nightly PRN      dicyclomine 20 MG tablet  Commonly known as: BENTYL   20 mg, Oral, 4 Times Daily PRN      ferrous sulfate 325 (65 FE) MG tablet   325 mg, Oral, Daily With Breakfast      lansoprazole 30 MG capsule  Commonly known as: PREVACID   30 mg, Oral, Daily      metFORMIN 500 MG tablet  Commonly known as: GLUCOPHAGE   500 mg, Oral, Daily With Breakfast      pregabalin 150 MG capsule  Commonly known as: LYRICA   150 mg, Oral, 2 Times Daily      rOPINIRole 0.25 MG tablet  Commonly known as: REQUIP   0.75 mg, Oral, 2 Times Daily      sertraline 100 MG tablet  Commonly known as: ZOLOFT   100 mg, Oral, Daily      vitamin C 500 MG tablet  Commonly known as: ASCORBIC ACID   500 mg, Oral, Daily         Stop These Medications    apixaban 5 MG tablet tablet  Commonly known as: ELIQUIS            Discharge Appointments:      Additional Instructions for the Follow-ups that You Need to Schedule     Discharge Follow-up with PCP   As directed       Currently Documented PCP:    Jacquelin Turk APRN    PCP Phone Number:    [phone]     Follow Up Details: hip arthritis, 1 weeks         [facility] Follow-[facility] with Specified Provider[facility] Magdiel PINO   As directed      To[facility]ologist[facility]         Discharge Follow-up with Specified Provider: urologyDr. Maher; 2 Weeks   As directed      To: urologyDr. Maher    Follow Up: 2 Weeks                  Katayoun Behbahani, MD  Hospitalist Service -- [facility]       09/16/20  16:38 EDT    Discharge Time:  < 30 minutes

## 2020-09-16 NOTE — CONSULTS
Consults  Date of Admit: 9/15/2020  Date of Consult: 09/16/20  No ref. provider found  Pavel Claudio  1945    Consulting Physician: Yadiel Sam MD    Cardiology consultation    Reason for consultation: Syncope, history of A. fib and CAD; T wave inversions inferior and laterally    Assessment:  1. Syncope  2. Pyelonephritis with right perinephretic hematoma  3. Type II diabetes  4. Paroxysmal atrial fibrillation, CHADs VASc is at least  3 for DM, NOCAD, and age.  Chronic anticoagulation on Eliquis.  5. SSS, s/p PPM  6. Non obstructive coronary artery disease per catheterization less than [facility] years ago at [facility].      Recommendations:  1. Pending interrogation of [employer] PPM, no further cardiac work-up is required.  Carotid ultrasound from 8/13/2019 showed no hemodynamically significant plaques or stenosis in the carotid system.  2. Syncopal episode, likely result of dehydration and resulting hypotension.      History of Present Illness    Subjective     Chief Complaint   Patient presents with   • Syncope       Pavel Claudio is a 74 y.o. male with past medical history significant for nonobstructive coronary artery disease, paroxysmal atrial fibrillation, sick sinus syndrome status post permanent pacemaker placement at the [facility] in 2018, non-insulin-dependent type II diabetes, arthritis, anxiety, and agent orange exposure.  He presented to the ED on 9/15/2020 with complaints of having had a syncopal episode earlier in the day.  He reported dizziness with standing and with exertion for the last couple of weeks.  He denied any dizziness at rest.  He also reported uncommitted flank right flank pain radiating to his right groin and suprapubic region.  He also stated that he had noticed blood in his urine recently.  The syncopal episode which occurred on the date of his presentation to the ED occurred while he was walking up a flight of stairs when he became lightheaded, he sat down and lost consciousness.  He slumped over in  "his chair and fell to the ground.  He states LOC lasted just a few seconds.  Cardiology was consulted due to syncopal episode in the presence of known paroxysmal atrial fibrillation, history of CAD.    Of note patient had an admission to this hospital in August 2019 with symptoms of dizziness and weakness as well as near syncope.    The patient was seen and examined.  He does state that he feels much better today.  He states that [facility] for [facility] at this time.  He denies any associated chest pain or palpitations yesterday previous to his syncopal episode, nor does he recall having the symptoms when he regained consciousness.  He states that he was only \"out\" for a few seconds.    Cardiac risk factors:arteriosclerotic heart disease, diabetes mellitus and hypercholesterolemia    Last Echo: 8/13/2019    Interpretation Summary    · Left ventricular systolic function is normal. Estimated EF appears to be in the range of 61 - 65%  · Left ventricular wall thickness is consistent with mild concentric hypertrophy.  · Left ventricular diastolic dysfunction (grade I) consistent with impaired relaxation.  · Mild-to-moderate mitral valve regurgitation is present  · Mild aortic valve regurgitation is present.  · Left atrial cavity size is moderately dilated.  · Mild tricuspid valve regurgitation is present.  · Moderate pulmonary hypertension is present  · There is no evidence of pericardial effusion     Last Stress: N/A    Last Cath: N/A      Past Medical History:   Diagnosis Date   • Agent orange exposure    • Anemia    • Anxiety    • Arthritis    • BPH (benign prostatic hyperplasia)    • CAD (coronary artery disease)    • Cheekbone fracture (CMS/HCC)     surgical repair    • Diabetes mellitus (CMS/HCC)    • GERD (gastroesophageal reflux disease)    • H/O foot surgery     left foot orif   • History of hernia surgery     right inguinal   • Hx of [place] spotted fever    • Pacemaker    • PAF " (paroxysmal atrial fibrillation) (CMS/HCC)    • Rotator cuff injury     bilat   • Varicose vein of leg     left lower leg     Past Surgical History:   Procedure Laterality Date   • APPENDECTOMY     • CARDIAC CATHETERIZATION     • CARDIAC SURGERY      pacemaker   • CARDIOVASCULAR STRESS TEST  04/21/2014    Dr SANDRA GÓMEZ 58%, pos for ischemia    • CARDIOVASCULAR STRESS TEST  06/26/2018    [facility]- nuclear stress test reported as normal. LVEF 62%   • COLONOSCOPY     • CONVERTED (HISTORICAL) HOLTER  03/04/2015    16 beat run of PAF baseline sinus dago at 50, no symptoms recorded   • CONVERTED (HISTORICAL) HOLTER  07/31/2018    72 hour- baseline sinus- minium 37 bpm and max 96 bpm, 36 pauses noted longest 2.3 sec, 4 beat SVT   • ECHO - CONVERTED  04/16/2014    heart and vascular Dr SANDRA GÓMEZ 65%, mild AR   • ENDOSCOPY     • FOOT SURGERY     • HERNIA REPAIR     • OTHER SURGICAL HISTORY  2015    Ecardio- sinus dago, continue observation   • PACEMAKER IMPLANTATION     • SHOULDER ARTHROSCOPY W/ ROTATOR CUFF REPAIR Right 3/19/2019    Procedure: SHOULDER ARTHROSCOPY WITH  OPEN ROTATOR CUFF REPAIR, ACOMIOPLASTY;  Surgeon: Tylor Berry MD;  Location: [facility];  Service: Orthopedics   • SHOULDER ARTHROSCOPY W/ ROTATOR CUFF REPAIR Left 7/11/2019    Procedure: SHOULDER ARTHROSCOPY WITH MINI OPEN ROTATOR CUFF REPAIR, ACROMIOPLASTY;  Surgeon: Tylor Berry MD;  Location: [facility];  Service: Orthopedics     Family History   Problem Relation Age of Onset   • Lung disease Mother    • Cancer Sister    • Cancer Brother      Social History     Tobacco Use   • Smoking status: Never Smoker   • Smokeless tobacco: Current User     Types: Chew   • Tobacco comment: patient counseled on effectsof tobacco use on health.    Substance Use Topics   • Alcohol use: No   • Drug use: No     Medications Prior to Admission   Medication Sig Dispense Refill Last Dose   • alfuzosin (UROXATRAL) 10 MG 24 hr tablet Take 10 mg by mouth Daily.    9/15/2020 at 0830   • apixaban (ELIQUIS) 5 MG tablet tablet Take 1 tablet by mouth Every 12 (Twelve) Hours. 180 tablet 3 9/15/2020 at 0830   • dicyclomine (BENTYL) 20 MG tablet Take 20 mg by mouth 4 (Four) Times a Day As Needed (stomach cramps).   9/14/2020 at Unknown time   • ferrous sulfate 325 (65 FE) MG tablet Take 325 mg by mouth Daily With Breakfast.   9/15/2020 at 0830   • lansoprazole (PREVACID) 30 MG capsule Take 30 mg by mouth Daily.   9/15/2020 at 0830   • metFORMIN (GLUCOPHAGE) 500 MG tablet Take 500 mg by mouth daily with breakfast.   9/15/2020 at 0830   • pregabalin (LYRICA) 150 MG capsule Take 150 mg by mouth 2 (Two) Times a Day.   9/15/2020 at 0830   • rOPINIRole (REQUIP) 0.25 MG tablet Take 0.75 mg by mouth 2 (Two) Times a Day.   9/15/2020 at 0830   • sertraline (ZOLOFT) 100 MG tablet Take 100 mg by mouth Daily.   9/15/2020 at 0830   • vitamin C (ASCORBIC ACID) 500 MG tablet Take 500 mg by mouth Daily.   9/15/2020 at 0830   • zolpidem (AMBIEN) 5 MG tablet Take 5 mg by mouth At Night As Needed for Sleep.   9/14/2020 at Unknown time     Allergies:  Patient has no known allergies.    Review of Systems  Constitutional: negative for appetite change and fatigue, fever  Respiratory: Negative for shortness of breath, cough  Cardiovascular: Negative for chest pain, palpitations, lower extremity swelling  Gastrointestinal: Negative for blood in stool  Neurological: Positive for dizziness, lightheadedness and syncope.  Negative for generalized weakness  Hematological: Does not bruise or bleed easily  All other systems were reviewed and are negative      Objective      Vital Signs  Temp:  [97.3 °F (36.3 °C)-99.1 °F (37.3 °C)] 97.3 °F (36.3 °C)  Heart Rate:  [60-68] 67  Resp:  [16-20] 18  BP: ()/() 135/68  Body mass index is 23.42 kg/m².    Intake/Output Summary (Last 24 hours) at 9/16/2020 0908  Last data filed at 9/16/2020 0630  Gross per 24 hour   Intake 2441.7 ml   Output 400 ml   Net 2041.7 ml        Physical Exam  Constitutional: Patient is oriented to person, place, and time.  He appears well-developed and well-nourished  Head: Normocephalic and atraumatic  Eyes: Pupils are equal, round and reactive to light  Neck: No JVD.  No carotid bruit present  Cardiovascular: Regular rhythm and rate.  No gallop, no murmur, and no friction rub noted. intact distal pulses.  No lower extremity edema  Pulmonary/chest: Effort normal and breath sounds normal.  No wheezes, no rales.  No respiratory distress.  Abdominal: Soft, no mass, no tenderness, no hernia  Neurological: Patient is alert and oriented to person, place and time  Skin: Skin is warm and dry  Psychiatric: Normal mood and affect  Vital signs reviewed    Telemetry:  Paced 60s    Results Review:   I reviewed the patient's new clinical results.  Results from last 7 days   Lab Units 09/16/20  0423 09/15/20  2128 09/15/20  1458   TROPONIN T ng/mL <0.010 <0.010 <0.010     Results from last 7 days   Lab Units 09/16/20  0018 09/15/20  1458   WBC 10*3/mm3 9.46 11.90*   HEMOGLOBIN g/dL 11.0* 12.1*   PLATELETS 10*3/mm3 186 194     Results from last 7 days   Lab Units 09/16/20  0018 09/15/20  1458   SODIUM mmol/L 143 144   POTASSIUM mmol/L 4.0 4.0   CHLORIDE mmol/L 109* 109*   CO2 mmol/L 23.6 23.7   BUN mg/dL 17 17   CREATININE mg/dL 1.22 1.37*   CALCIUM mg/dL 8.8 9.6   GLUCOSE mg/dL 217* 162*   ALT (SGPT) U/L 13 14   AST (SGOT) U/L 17 19     Lab Results   Component Value Date    INR 1.47 (H) 08/12/2019    INR 1.1 04/24/2014     No results found for: MG  Lab Results   Component Value Date    TSH 1.740 09/15/2020    CHLPL 181 09/21/2016    TRIG 335 (H) 09/21/2016    HDL 23 (L) 09/21/2016    LDL 91 09/21/2016      No results found for: BNP     EKG:           Imaging Results (Last 72 Hours)     Procedure Component Value Units Date/Time    CT Abdomen Pelvis Stone Protocol [[id number]] Collected: 09/15/20 1740     Updated: 09/15/20 1954    Narrative:      EXAM: CT ABDOMEN  PELVIS STONE PROTOCOL-            TECHNIQUE: Multiple axial CT images were obtained from lung bases  through pubic symphysis WITHOUT administration of IV contrast.  Reformatted images in the coronal and/or sagittal plane(s) were  generated from the axial data set to facilitate diagnostic accuracy  and/or surgical planning.  Oral Contrast:NONE.     Radiation dose reduction techniques were utilized per ALARA protocol.  Automated exposure control was initiated through either or [place] or  [employer] software packages by  protocol.       DOSE: 901.19 mGy.cm     Clinical information  Flank pain, stone disease suspected      Comparison  NONE.     FINDINGS:     Lower thorax:   Clear. No effusions.     Abdomen:     Liver:   Homogeneous. No focal hepatic mass or ductal dilatation.     Gallbladder:   Stone in the cystic duct     Pancreas:   Unremarkable. No mass or ductal dilatation.     Spleen:   Homogeneous. No splenomegaly.     Adrenals:   No mass.     Kidneys/ureters:   Extensive perinephric stranding on the right. Mixed density collection  intimate with the posterior cortex of the right kidney most likely  representing a large right-sided renal hematoma.     Nonobstructing left intrarenal stone is present.     GI tract:   Non-dilated. No definite wall thickening.     Peritoneum:   No free air. No free fluid or loculated fluid collections.     Mesentery:   Unremarkable.     Lymph nodes:   No lymphadenopathy.     Vasculature:   No evidence of aneurysm.     Abdominal wall:   No focal hernia or mass.        Other: None.     Pelvis:     Bladder:   No focal mass or significant wall thickening     Reproductive:   Unremarkable as visualized.     Appendix:   No evidence of appendicitis     Bones:   No acute bony abnormality.       Impression:         1. Large mixed attenuation collection intimate with the posterior  inferior aspect of the right kidney compatible with large right-sided  renal hematoma. Extensive  perinephric stranding on the right. No  hydronephrosis.     2.Cholelithiasis  3. Nonobstructing left intrarenal stone     I have discussed the results with the emergency department              This report was finalized on 9/15/2020 5:43 PM by Dr. Bryce Bianchi MD.       XR Chest 1 View [[id number]] Collected: 09/15/20 1527     Updated: 09/15/20 1553    Narrative:      XR CHEST 1 VW-     CLINICAL INDICATION: syncope        COMPARISON: 08/12/2019      TECHNIQUE: Single frontal view of the chest.     FINDINGS:     There is no focal alveolar infiltrate or effusion.  The cardiac silhouette is normal. The pulmonary vasculature is  unremarkable.  There is no evidence of an acute osseous abnormality.   There are no suspicious-appearing parenchymal soft tissue nodules.          Impression:      No evidence of active or acute cardiopulmonary disease on today's chest  radiograph.     This report was finalized on 9/15/2020 3:27 PM by Dr. Bryce Bianchi MD.                Thank you very much for asking us to be involved in this patient's care.  We will follow along with you.    RAISSA Arnold   09/16/20  09:08 EDT

## 2020-09-16 NOTE — PROGRESS NOTES
Discharge Planning Assessment   [facility]     Patient Name: Pavel Claudio  MRN: [id number]  Today's Date: 9/16/2020    Admit Date: 9/15/2020        Discharge Plan     Row Name 09/16/20 1714       Plan    Final Discharge Disposition Code  01 - home or self-care    Final Note  Pt to be discharged home.              KHUSHI PetersonW

## 2020-09-16 NOTE — DISCHARGE INSTR - APPOINTMENTS
Pt  Has  An  Apt  With  han Adrian  For sept 23  At 1  Pm   Pt  Stated he  [facility]  [facility]  [facility]  And  Pt  Has  An  Apt   With  Dr donald  For oct 5  At  2 ;10

## 2020-09-16 NOTE — CONSULTS
Name:  Pavel Claudio  :  1945    DATE OF ADMISSION  9/15/2020    DATE OF CONSULT  2020     REFERRING PHYSICIAN  * No referring provider recorded for this case *    PRIMARY CARE PHYSICIAN  Jacquelin Turk APRN    REASON FOR CONSULT  *Right perinephric hematoma**    CHIEF COMPLAINT  Chief Complaint   Patient presents with   • Syncope       HISTORY OF PRESENT ILLNESS:   Pavel Claudio is a 74 y.o. male who has a  history of CAD, BPH, Type II DM, GERD, paroxysmal afib s/p pacemaker placement, arthritis and anemia who presents with complaints of suffering a syncopal episode earlier today. He reports he has been experiencing dizziness with standing and with exertion for the last couple weeks. He denies dizziness at rest. He denies chest pain, dizziness, nausea or vomiting. During this period of time over the last few weeks, he also has experienced right flank pain radiating to his right groin and suprapubic region. He has also noticed blood in his urine recently. He denies cloudy or foul smelling urine. Today, he was walking up a flight of stairs when he became lightheaded and sat down in a chair to rest. Shortly after sitting down he lost consciousness, slumped over in his chair and then fell to the ground. He regained consciousness just a few seconds later.   He has no other urologic complaint.  There is no history of tumors.  He has a fairly significant renal hematoma looks perinephric.  There is some stranding consistent with bleeding.  It actually had pain 2 weeks ago and is been ongoing.  He is on Eliquis for atrial fibrillation has a pacemaker.  As long as his hemoglobin is been stable I see no reason that the man needs to be to stay the most important thing is we stop the Eliquis and reevaluate him with an MRI scan to rule out the possibility of an intrinsic small tumor it should not be done as an inpatient because it will give us no evidence and no insight if it is during an acute process.   Other than that the films look fine follow back up with him based on this    I saw Pavel Claudio in their hospital room this morning.    PAST MEDICAL HISTORY  Past Medical History:   Diagnosis Date   • Agent orange exposure    • Anemia    • Anxiety    • Arthritis    • BPH (benign prostatic hyperplasia)    • CAD (coronary artery disease)    • Cheekbone fracture (CMS/HCC)     surgical repair    • Diabetes mellitus (CMS/HCC)    • GERD (gastroesophageal reflux disease)    • H/O foot surgery     left foot orif   • History of hernia surgery     right inguinal   • Hx of [place] spotted fever    • Pacemaker    • PAF (paroxysmal atrial fibrillation) (CMS/HCC)    • Rotator cuff injury     bilat   • Varicose vein of leg     left lower leg       PAST SURGICAL HISTORY  Past Surgical History:   Procedure Laterality Date   • APPENDECTOMY     • CARDIAC CATHETERIZATION     • CARDIAC SURGERY      pacemaker   • CARDIOVASCULAR STRESS TEST  04/21/2014    Dr SANDRA GÓMEZ 58%, pos for ischemia    • CARDIOVASCULAR STRESS TEST  06/26/2018    [facility]- nuclear stress test reported as normal. LVEF 62%   • COLONOSCOPY     • CONVERTED (HISTORICAL) HOLTER  03/04/2015    16 beat run of PAF baseline sinus dago at 50, no symptoms recorded   • CONVERTED (HISTORICAL) HOLTER  07/31/2018    72 hour- baseline sinus- minium 37 bpm and max 96 bpm, 36 pauses noted longest 2.3 sec, 4 beat SVT   • ECHO - CONVERTED  04/16/2014    heart and vascular Dr SANDRA GÓMEZ 65%, mild AR   • ENDOSCOPY     • FOOT SURGERY     • HERNIA REPAIR     • OTHER SURGICAL HISTORY  2015    Ecardio- sinus dago, continue observation   • PACEMAKER IMPLANTATION     • SHOULDER ARTHROSCOPY W/ ROTATOR CUFF REPAIR Right 3/19/2019    Procedure: SHOULDER ARTHROSCOPY WITH  OPEN ROTATOR CUFF REPAIR, ACOMIOPLASTY;  Surgeon: Tylor Berry MD;  Location: [facility];  Service: Orthopedics   • SHOULDER ARTHROSCOPY W/ ROTATOR CUFF REPAIR Left 7/11/2019    Procedure: SHOULDER ARTHROSCOPY WITH  MINI OPEN ROTATOR CUFF REPAIR, ACROMIOPLASTY;  Surgeon: Tylor Berry MD;  Location: [facility];  Service: Orthopedics       SOCIAL HISTORY  Social History     Socioeconomic History   • Marital status:      Spouse name: Not on file   • Number of children: Not on file   • Years of education: Not on file   • Highest education level: Not on file   Tobacco Use   • Smoking status: Never Smoker   • Smokeless tobacco: Current User     Types: Chew   • Tobacco comment: patient counseled on effectsof tobacco use on health.    Substance and Sexual Activity   • Alcohol use: No   • Drug use: No   • Sexual activity: Defer     Partners: Female     Birth control/protection: None       FAMILY HISTORY  Family History   Problem Relation Age of Onset   • Lung disease Mother    • Cancer Sister    • Cancer Brother        ALLERGIES  No Known Allergies    INPATIENT MEDICATIONS  Current Facility-Administered Medications   Medication Dose Route Frequency Provider Last Rate Last Dose   • cefTRIAXone (ROCEPHIN) 1 g/100 mL 0.9% NS (MBP)  1 g Intravenous Q24H Kris Roldan MD       • dextrose (D50W) 25 g/ 50mL Intravenous Solution 25 g  25 g Intravenous Q15 Min PRN Kris Roldan MD       • dextrose (GLUTOSE) oral gel 15 g  15 g Oral Q15 Min PRN Kris Roldan MD       • dicyclomine (BENTYL) tablet 20 mg  20 mg Oral 4x Daily PRN Behbahani, Katayoun, MD       • ferrous sulfate tablet 325 mg  325 mg Oral Daily With Breakfast Behbahani, Katayoun, MD   325 mg at 09/16/20 1057   • glucagon (human recombinant) (GLUCAGEN DIAGNOSTIC) injection 1 mg  1 mg Subcutaneous Q15 Min PRN Kris Roldan MD       • insulin aspart (novoLOG) injection 0-7 Units  0-7 Units Subcutaneous TID AC Kris Roldan MD       • nitroglycerin (NITROSTAT) SL tablet 0.4 mg  0.4 mg Sublingual Q5 Min PRN Kris Roldan MD       • pantoprazole (PROTONIX) EC tablet 40 mg  40 mg Oral QAM Behbahani, Katayoun, MD   40 mg  at 09/16/20 1058   • pregabalin (LYRICA) capsule 150 mg  150 mg Oral BID Behbahani, Katayoun, MD   150 mg at 09/16/20 1057   • rOPINIRole (REQUIP) tablet 0.75 mg  0.75 mg Oral BID Behbahani, Katayoun, MD   0.75 mg at 09/16/20 1057   • sertraline (ZOLOFT) tablet 100 mg  100 mg Oral Daily Behbahani, Katayoun, MD   100 mg at 09/16/20 1057   • sodium chloride 0.9 % flush 10 mL  10 mL Intravenous PRN Kris Roldan MD       • sodium chloride 0.9 % flush 10 mL  10 mL Intravenous Q12H Kris Roldan MD   10 mL at 09/15/20 2209   • sodium chloride 0.9 % flush 10 mL  10 mL Intravenous PRN Kris Roldan MD       • sodium chloride 0.9 % infusion  100 mL/hr Intravenous Continuous Kris Roldan  mL/hr at 09/16/20 1058 100 mL/hr at 09/16/20 1058   • tamsulosin (FLOMAX) 24 hr capsule 0.4 mg  0.4 mg Oral Nightly Behbahani, Katayoun, MD       • vitamin C (ASCORBIC ACID) tablet 500 mg  500 mg Oral Daily Behbahani, Katayoun, MD   500 mg at 09/16/20 1058   • zolpidem (AMBIEN) tablet 5 mg  5 mg Oral Nightly PRN Behbahani, Katayoun, MD           REVIEW OF SYSTEMS  CONSTITUTIONAL:  No fever, chills, night sweats or fatigue.  EYES:  No blurry vision, diplopia or other vision changes.  ENT:  No hearing loss, nosebleeds or sore throat.  CARDIOVASCULAR:  No palpitations, arrhythmia, syncopal episodes or edema.  PULMONARY:  No hemoptysis, wheezing, chronic cough or shortness of breath.  GASTROINTESTINAL:  No nausea or vomiting.  No constipation or diarrhea.  No abdominal pain.  GENITOURINARY:  No hematuria, kidney stones or frequent urination.  MUSCULOSKELETAL:  No joint or back pains.  INTEGUMENTARY: No rashes or pruritus.  ENDOCRINE:  No excessive thirst or hot flashes.  HEMATOLOGIC:  No history of free bleeding, spontaneous bleeding or clotting.  IMMUNOLOGIC:  No allergies or frequent infections.  NEUROLOGIC: No numbness, tingling, seizures or weakness.  PSYCHIATRIC:  No anxiety or  "depression.    PHYSICAL EXAMINATION    /63 (BP Location: Left arm, Patient Position: Standing)   Pulse 66   Temp 97.4 °F (36.3 °C) (Oral)   Resp 18   Ht 177.8 cm (70\")   Wt 74 kg (163 lb 3.2 oz)   SpO2 95%   BMI 23.42 kg/m²     GENERAL:  A well-developed, well-nourished, white male in no acute distress.  HEENT:  Pupils equally round and reactive to light.  Extraocular muscles intact.  CARDIOVASCULAR:  Regular rate and rhythm.  No murmurs, gallops or rubs.  LUNGS:  Clear to auscultation bilaterally.  ABDOMEN:  Soft, nontender, nondistended with positive bowel sounds.  EXTREMITIES:  No clubbing, cyanosis or edema bilaterally.  SKIN:  No rashes or petechiae.  NEURO:  Cranial nerves grossly intact.  No focal deficits.  PSYCH:  Alert and oriented x3.    LABORATORY     WBC   Date Value Ref Range Status   09/16/2020 9.46 3.40 - 10.80 10*3/mm3 Final     RBC   Date Value Ref Range Status   09/16/2020 3.71 (L) 4.14 - 5.80 10*6/mm3 Final     Hemoglobin   Date Value Ref Range Status   09/16/2020 11.0 (L) 13.0 - 17.7 g/dL Final     Hematocrit   Date Value Ref Range Status   09/16/2020 35.6 (L) 37.5 - 51.0 % Final     MCV   Date Value Ref Range Status   09/16/2020 96.0 79.0 - 97.0 fL Final     MCH   Date Value Ref Range Status   09/16/2020 29.6 26.6 - 33.0 pg Final     MCHC   Date Value Ref Range Status   09/16/2020 30.9 (L) 31.5 - 35.7 g/dL Final     RDW   Date Value Ref Range Status   09/16/2020 13.1 12.3 - 15.4 % Final     RDW-SD   Date Value Ref Range Status   09/16/2020 45.5 37.0 - 54.0 fl Final     MPV   Date Value Ref Range Status   09/16/2020 11.5 6.0 - 12.0 fL Final     Platelets   Date Value Ref Range Status   09/16/2020 186 140 - 450 10*3/mm3 Final     Neutrophil %   Date Value Ref Range Status   09/16/2020 77.9 (H) 42.7 - 76.0 % Final     Lymphocyte %   Date Value Ref Range Status   09/16/2020 11.7 (L) 19.6 - 45.3 % Final     Monocyte %   Date Value Ref Range Status   09/16/2020 8.9 5.0 - 12.0 % Final "     Eosinophil %   Date Value Ref Range Status   09/16/2020 0.8 0.3 - 6.2 % Final     Basophil %   Date Value Ref Range Status   09/16/2020 0.2 0.0 - 1.5 % Final     Immature Grans %   Date Value Ref Range Status   09/16/2020 0.5 0.0 - 0.5 % Final     Neutrophils, Absolute   Date Value Ref Range Status   09/16/2020 7.36 (H) 1.70 - 7.00 10*3/mm3 Final     Lymphocytes, Absolute   Date Value Ref Range Status   09/16/2020 1.11 0.70 - 3.10 10*3/mm3 Final     Monocytes, Absolute   Date Value Ref Range Status   09/16/2020 0.84 0.10 - 0.90 10*3/mm3 Final     Eosinophils, Absolute   Date Value Ref Range Status   09/16/2020 0.08 0.00 - 0.40 10*3/mm3 Final     Basophils, Absolute   Date Value Ref Range Status   09/16/2020 0.02 0.00 - 0.20 10*3/mm3 Final     Immature Grans, Absolute   Date Value Ref Range Status   09/16/2020 0.05 0.00 - 0.05 10*3/mm3 Final     nRBC   Date Value Ref Range Status   09/16/2020 0.0 0.0 - 0.2 /100 WBC Final       Glucose   Date Value Ref Range Status   09/16/2020 217 (H) 65 - 99 mg/dL Final     Sodium   Date Value Ref Range Status   09/16/2020 143 136 - 145 mmol/L Final     Potassium   Date Value Ref Range Status   09/16/2020 4.0 3.5 - 5.2 mmol/L Final     CO2   Date Value Ref Range Status   09/16/2020 23.6 22.0 - 29.0 mmol/L Final     Chloride   Date Value Ref Range Status   09/16/2020 109 (H) 98 - 107 mmol/L Final     Anion Gap   Date Value Ref Range Status   09/16/2020 10.4 5.0 - 15.0 mmol/L Final     Creatinine   Date Value Ref Range Status   09/16/2020 1.22 0.76 - 1.27 mg/dL Final     BUN   Date Value Ref Range Status   09/16/2020 17 8 - 23 mg/dL Final     BUN/Creatinine Ratio   Date Value Ref Range Status   09/16/2020 13.9 7.0 - 25.0 Final     Calcium   Date Value Ref Range Status   09/16/2020 8.8 8.6 - 10.5 mg/dL Final     eGFR Non  Amer   Date Value Ref Range Status   09/16/2020 58 (L) >60 mL/min/1.73 Final     Alkaline Phosphatase   Date Value Ref Range Status   09/16/2020 80 39 -  117 U/L Final     Total Protein   Date Value Ref Range Status   09/16/2020 6.3 6.0 - 8.5 g/dL Final     ALT (SGPT)   Date Value Ref Range Status   09/16/2020 13 1 - 41 U/L Final     AST (SGOT)   Date Value Ref Range Status   09/16/2020 17 1 - 40 U/L Final     Total Bilirubin   Date Value Ref Range Status   09/16/2020 0.3 0.0 - 1.2 mg/dL Final     Albumin   Date Value Ref Range Status   09/16/2020 3.90 3.50 - 5.20 g/dL Final     Globulin   Date Value Ref Range Status   09/16/2020 2.4 gm/dL Final       No results found for: MG, PHOS  No results found for: LDH, URICACID     IMAGING  Imaging Results (Last 72 Hours)     Procedure Component Value Units Date/Time    CT Abdomen Pelvis Stone Protocol [[id number]] Collected: 09/15/20 1740     Updated: 09/15/20 1954    Narrative:      EXAM: CT ABDOMEN PELVIS STONE PROTOCOL-            TECHNIQUE: Multiple axial CT images were obtained from lung bases  through pubic symphysis WITHOUT administration of IV contrast.  Reformatted images in the coronal and/or sagittal plane(s) were  generated from the axial data set to facilitate diagnostic accuracy  and/or surgical planning.  Oral Contrast:NONE.     Radiation dose reduction techniques were utilized per ALARA protocol.  Automated exposure control was initiated through either or [employer] or  [employer] software packages by  protocol.       DOSE: 901.19 mGy.cm     Clinical information  Flank pain, stone disease suspected      Comparison  NONE.     FINDINGS:     Lower thorax:   Clear. No effusions.     Abdomen:     Liver:   Homogeneous. No focal hepatic mass or ductal dilatation.     Gallbladder:   Stone in the cystic duct     Pancreas:   Unremarkable. No mass or ductal dilatation.     Spleen:   Homogeneous. No splenomegaly.     Adrenals:   No mass.     Kidneys/ureters:   Extensive perinephric stranding on the right. Mixed density collection  intimate with the posterior cortex of the right kidney most likely  representing a  large right-sided renal hematoma.     Nonobstructing left intrarenal stone is present.     GI tract:   Non-dilated. No definite wall thickening.     Peritoneum:   No free air. No free fluid or loculated fluid collections.     Mesentery:   Unremarkable.     Lymph nodes:   No lymphadenopathy.     Vasculature:   No evidence of aneurysm.     Abdominal wall:   No focal hernia or mass.        Other: None.     Pelvis:     Bladder:   No focal mass or significant wall thickening     Reproductive:   Unremarkable as visualized.     Appendix:   No evidence of appendicitis     Bones:   No acute bony abnormality.       Impression:         1. Large mixed attenuation collection intimate with the posterior  inferior aspect of the right kidney compatible with large right-sided  renal hematoma. Extensive perinephric stranding on the right. No  hydronephrosis.     2.Cholelithiasis  3. Nonobstructing left intrarenal stone     I have discussed the results with the emergency department              This report was finalized on 9/15/2020 5:43 PM by Dr. Bryce Bianchi MD.       XR Chest 1 View [[id number]] Collected: 09/15/20 1527     Updated: 09/15/20 1553    Narrative:      XR CHEST 1 VW-     CLINICAL INDICATION: syncope        COMPARISON: 08/12/2019      TECHNIQUE: Single frontal view of the chest.     FINDINGS:     There is no focal alveolar infiltrate or effusion.  The cardiac silhouette is normal. The pulmonary vasculature is  unremarkable.  There is no evidence of an acute osseous abnormality.   There are no suspicious-appearing parenchymal soft tissue nodules.          Impression:      No evidence of active or acute cardiopulmonary disease on today's chest  radiograph.     This report was finalized on 9/15/2020 3:27 PM by Dr. Bryce Bianchi MD.             PATHOLOGY  * Cannot find OR log *    IMPRESSION AND PLAN  Pavel Claudio is a 74 y.o., white male with:  #1.-Right perinephric hematoma recommend stopping the blood thinner until we  are sure that the bleeding is under control and follow-up with me as an outpatient with a noncontrasted MRI scan to be sure were not dealing with a small tumor this x-ray should not be done as an inpatient during the acute process as it will give us minimal valuable information.  The most important thing is that he stops the blood thinner    The patient was in agreement with these plans.    Thank you for asking us to participate in Pavel Claudio's care.  We will continue to follow with you.  Please do not hesitate to call with any questions or concerns that you may have.    A total of 60 minutes were spent coordinating this patient’s care in clinic today; 30 minutes of which were face-to-face with the patient, reviewing medical history and counseling on the current treatment and followup plan.  All questions were answered to patient's satisfaction.       This document was signed by No name on file. September 16, 2020 12:04 EDT

## 2020-09-16 NOTE — PROGRESS NOTES
[facility] Medicine Services  PROGRESS NOTE     Patient Identification:  Name:  Pavel Claudio  Age:  74 y.o.  Sex:  male  :  1945  MRN:  [id number]  Visit Number:  [id number]  Primary Care Provider:  Jacquelin Turk APRN    Length of stay:  1    ----------------------------------------------------------------------------------------------------------------------  Subjective     Chief Complaint:  Follow up for syncope     Subjective:  Today, the patient reports feeling better today.  However he complains of bilateral groin pain.  He has had this pain for a while no exacerbating or alleviating factors reported.  No history of fall prior to syncopal episode yesterday.  Patient reports getting dizzy when standing up for a while.  He does fall room and works outside most of the day and heat or otherwise.  He was hypotensive with blood pressure 77/49 on arrival.    ----------------------------------------------------------------------------------------------------------------------  Objective     Current Hospital Meds:  cefTRIAXone, 1 g, Intravenous, Q24H  ferrous sulfate, 325 mg, Oral, Daily With Breakfast  insulin aspart, 0-7 Units, Subcutaneous, TID AC  pantoprazole, 40 mg, Oral, QAM  pregabalin, 150 mg, Oral, BID  rOPINIRole, 0.75 mg, Oral, BID  sertraline, 100 mg, Oral, Daily  sodium chloride, 10 mL, Intravenous, Q12H  tamsulosin, 0.4 mg, Oral, Nightly  vitamin C, 500 mg, Oral, Daily      sodium chloride, 100 mL/hr, Last Rate: 100 mL/hr (20 1058)      ----------------------------------------------------------------------------------------------------------------------  Vital Signs:  Temp:  [97.3 °F (36.3 °C)-99.1 °F (37.3 °C)] 97.4 °F (36.3 °C)  Heart Rate:  [60-68] 66  Resp:  [17-20] 18  BP: ()/() 137/63  Mean Arterial Pressure (Non-Invasive) for the past 24 hrs (Last 3 readings):   Noninvasive MAP (mmHg)   20 1032 81    09/16/20 1030 81   09/16/20 1028 101     SpO2 Percentage    09/16/20 1028 09/16/20 1030 09/16/20 1032   SpO2: 96% 95% 95%     SpO2:  [95 %-99 %] 95 %  on   ;   Device (Oxygen Therapy): room air    Body mass index is 23.42 kg/m².  Wt Readings from Last 3 Encounters:   09/15/20 74 kg (163 lb 3.2 oz)   10/21/19 68.5 kg (151 lb 0.2 oz)   08/28/19 68.5 kg (151 lb)        Intake/Output Summary (Last 24 hours) at 9/16/2020 1425  Last data filed at 9/16/2020 0938  Gross per 24 hour   Intake 2441.7 ml   Output 600 ml   Net 1841.7 ml     Diet Regular  ----------------------------------------------------------------------------------------------------------------------  Physical exam:   GEN: NAD  CV: RRR, no audible murmur  PULM: CTA, no wheeze or crackle    ----------------------------------------------------------------------------------------------------------------------  Results from last 7 days   Lab Units 09/16/20  0423 09/15/20  2128 09/15/20  1458   TROPONIN T ng/mL <0.010 <0.010 <0.010           Results from last 7 days   Lab Units 09/16/20  0018 09/15/20  1458   CRP mg/dL 1.11* 0.47   WBC 10*3/mm3 9.46 11.90*   HEMOGLOBIN g/dL 11.0* 12.1*   HEMATOCRIT % 35.6* 38.3   MCV fL 96.0 94.3   MCHC g/dL 30.9* 31.6   PLATELETS 10*3/mm3 186 194     Results from last 7 days   Lab Units 09/16/20  0018 09/15/20  1458   SODIUM mmol/L 143 144   POTASSIUM mmol/L 4.0 4.0   CHLORIDE mmol/L 109* 109*   CO2 mmol/L 23.6 23.7   BUN mg/dL 17 17   CREATININE mg/dL 1.22 1.37*   EGFR IF NONAFRICN AM mL/min/1.73 58* 51*   CALCIUM mg/dL 8.8 9.6   GLUCOSE mg/dL 217* 162*   ALBUMIN g/dL 3.90 4.33   BILIRUBIN mg/dL 0.3 0.4   ALK PHOS U/L 80 90   AST (SGOT) U/L 17 19   ALT (SGPT) U/L 13 14   Estimated Creatinine Clearance: 55.6 mL/min (by C-G formula based on SCr of 1.22 mg/dL).  No results found for: AMMONIA    Hemoglobin A1C   Date/Time Value Ref Range Status   09/15/2020 1458 5.90 (H) 4.80 - 5.60 % Final     Glucose   Date/Time Value Ref Range  Status   09/16/2020 1023 104 70 - 130 mg/dL Final   09/16/2020 0629 107 70 - 130 mg/dL Final   09/15/2020 2208 228 (H) 70 - 130 mg/dL Final     Lab Results   Component Value Date    HGBA1C 5.90 (H) 09/15/2020     Lab Results   Component Value Date    TSH 1.740 09/15/2020     ----------------------------------------------------------------------------------------------------------------------  Imaging Results (Last 24 Hours)     Procedure Component Value Units Date/Time    CT Abdomen Pelvis Stone Protocol [[id number]] Collected: 09/15/20 1740     Updated: 09/15/20 1954    Narrative:      EXAM: CT ABDOMEN PELVIS STONE PROTOCOL-            TECHNIQUE: Multiple axial CT images were obtained from lung bases  through pubic symphysis WITHOUT administration of IV contrast.  Reformatted images in the coronal and/or sagittal plane(s) were  generated from the axial data set to facilitate diagnostic accuracy  and/or surgical planning.  Oral Contrast:NONE.     Radiation dose reduction techniques were utilized per ALARA protocol.  Automated exposure control was initiated through either or CareDoLessno or  DoseRight software packages by  protocol.       DOSE: 901.19 mGy.cm     Clinical information  Flank pain, stone disease suspected      Comparison  NONE.     FINDINGS:     Lower thorax:   Clear. No effusions.     Abdomen:     Liver:   Homogeneous. No focal hepatic mass or ductal dilatation.     Gallbladder:   Stone in the cystic duct     Pancreas:   Unremarkable. No mass or ductal dilatation.     Spleen:   Homogeneous. No splenomegaly.     Adrenals:   No mass.     Kidneys/ureters:   Extensive perinephric stranding on the right. Mixed density collection  intimate with the posterior cortex of the right kidney most likely  representing a large right-sided renal hematoma.     Nonobstructing left intrarenal stone is present.     GI tract:   Non-dilated. No definite wall thickening.     Peritoneum:   No free air. No free fluid or  loculated fluid collections.     Mesentery:   Unremarkable.     Lymph nodes:   No lymphadenopathy.     Vasculature:   No evidence of aneurysm.     Abdominal wall:   No focal hernia or mass.        Other: None.     Pelvis:     Bladder:   No focal mass or significant wall thickening     Reproductive:   Unremarkable as visualized.     Appendix:   No evidence of appendicitis     Bones:   No acute bony abnormality.       Impression:         1. Large mixed attenuation collection intimate with the posterior  inferior aspect of the right kidney compatible with large right-sided  renal hematoma. Extensive perinephric stranding on the right. No  hydronephrosis.     2.Cholelithiasis  3. Nonobstructing left intrarenal stone     I have discussed the results with the emergency department              This report was finalized on 9/15/2020 5:43 PM by Dr. Bryce Bianchi MD.       XR Chest 1 View [[id number]] Collected: 09/15/20 1527     Updated: 09/15/20 1553    Narrative:      XR CHEST 1 VW-     CLINICAL INDICATION: syncope        COMPARISON: 08/12/2019      TECHNIQUE: Single frontal view of the chest.     FINDINGS:     There is no focal alveolar infiltrate or effusion.  The cardiac silhouette is normal. The pulmonary vasculature is  unremarkable.  There is no evidence of an acute osseous abnormality.   There are no suspicious-appearing parenchymal soft tissue nodules.          Impression:      No evidence of active or acute cardiopulmonary disease on today's chest  radiograph.     This report was finalized on 9/15/2020 3:27 PM by Dr. Bryce Bianchi MD.             ----------------------------------------------------------------------------------------------------------------------  Assessment/Plan       · Syncope-likely related to hypovolemia and hypotension due to dehydration.  Patient had similar presentation last year and symptoms improved with IV fluid.  Carotid ultrasound in 2019 showed no significant stenosis.  Ultrasound was  reordered overnight for further monitoring.  Due to some EKG changes with lateral T wave flattening as well as history of A. fib with pacemaker placement due to sick sinus syndrome cardiology was consulted overnight.  Patient reports having left heart catheterization less than 5 years ago that showed nonobstructive coronary artery disease.  Therefore stress test is not recommended by cardiology at this time.  However final recommendations from cardiology is pending.  · Hypertension-likely cause of syncope.  Resolved with normal blood pressure now after IV fluid challenge  · Dehydration-likely because of above  · Large right perinephric hematoma-patient has been complaining of right-sided flank pain for the past several days.  Noted large hematoma on right kidney with CT scan.  Unlikely to be the result of recent syncope and fall given if patient was symptomatic prior to this event.  Urology was consulted and recommending to hold anticoagulation until follow-up with urology as outpatient would MRI kidney without contrast at that time.  Patient noted to have BECKIE on admission however that has been improving with IV fluids so far.  · Persistent atrial fibrillation-currently in atrially paced rhythm.  No AV deandre blockade agents on [place].  Continue to hold apixaban due to above.  · Sick sinus syndrome-status post pacemaker placement.  Pacemaker interrogation pending as part of syncope work-up  · Bilateral groin pain-likely due to heavy lifting.  No palpable hernia noted.  CT abdomen pelvis without bony fractures or abnormality.  Repeating CT pelvis with extension to femur to assess hip joints for any possible hairline fractures.  · Depression/anxiety-continue home sertraline  · Type 2 diabetes with peripheral neuropathy-sliding-scale insulin with pregabalin  · GERD-lansoprazole  · Chronic iron deficiency anemia-continue home iron supplement  · IBS-Bentyl as needed  · BPH-on Flomax at home  · Restless leg  syndrome-ropinirole this can cause hypotension intermittently therefore dizziness and syncope.  Patient may benefit from discontinuing this medication and monitoring symptoms as outpatient  · Possible UTI versus bacteriuria or-UA with only 3-5 WBCs but nitrite positive.  Urine cultures currently pending.  Continue Rocephin in the meantime    --------------------------------------------------  DVT Prophylaxis: SCD due to large renal hematoma     Disposition: possible d/c home later today or tomorrow.   --------------------------------------------------        Katayoun Behbahani, MD  Hospitalist Service -- [facility]     09/16/20  14:25 EDT

## 2020-09-16 NOTE — H&P
Hospitalist History and Physical        Patient Identification  Name: Pavel Claudio  Age/Sex: 74 y.o. male  :  1945        MRN: [id number]  Visit Number: [id number]  PCP: Jacquelin Turk APRN          Chief complaint syncopal episode, right flank pain    History of Present Illness:  Patient is a 74 y.o. male with history of CAD, BPH, Type II DM, GERD, paroxysmal afib s/p pacemaker placement, arthritis and anemia who presents with complaints of suffering a syncopal episode earlier today. He reports he has been experiencing dizziness with standing and with exertion for the last couple weeks. He denies dizziness at rest. He denies chest pain, dizziness, nausea or vomiting. During this period of time over the last few weeks, he also has experienced right flank pain radiating to his right groin and suprapubic region. He has also noticed blood in his urine recently. He denies cloudy or foul smelling urine. Today, he was walking up a flight of stairs when he became lightheaded and sat down in a chair to rest. Shortly after sitting down he lost consciousness, slumped over in his kacie and then fell to the ground. He regained consciousness just a few seconds later.     Review of Systems  Review of Systems   Constitutional: Negative for activity change, appetite change, chills, diaphoresis, fatigue, fever and unexpected weight change.   HENT: Negative for congestion, postnasal drip, rhinorrhea, sinus pressure, sinus pain and sore throat.    Eyes: Negative for photophobia, pain, discharge, redness, itching and visual disturbance.   Respiratory: Negative for cough, shortness of breath and wheezing.    Cardiovascular: Negative for chest pain, palpitations and leg swelling.   Gastrointestinal: Positive for diarrhea (chronic, intermittent, unchanged from baseline). Negative for abdominal distention, abdominal pain, constipation, nausea and vomiting.   Endocrine: Negative for cold intolerance, heat intolerance,  polydipsia, polyphagia and polyuria.   Genitourinary: Positive for flank pain and hematuria. Negative for difficulty urinating, dysuria and urgency.   Musculoskeletal: Negative for arthralgias, back pain, joint swelling, myalgias, neck pain and neck stiffness.   Skin: Negative for color change, pallor, rash and wound.   Neurological: Positive for dizziness, syncope and light-headedness. Negative for tremors, seizures, weakness, numbness and headaches.   Hematological: Negative for adenopathy. Does not bruise/bleed easily.   Psychiatric/Behavioral: Negative for agitation, behavioral problems and confusion.       History  Past Medical History:   Diagnosis Date   • Agent orange exposure    • Anemia    • Anxiety    • Arthritis    • BPH (benign prostatic hyperplasia)    • CAD (coronary artery disease)    • Cheekbone fracture (CMS/HCC)     surgical repair    • Diabetes mellitus (CMS/HCC)    • GERD (gastroesophageal reflux disease)    • H/O foot surgery     left foot orif   • History of hernia surgery     right inguinal   • Hx of Damon Mountain spotted fever    • Pacemaker    • PAF (paroxysmal atrial fibrillation) (CMS/HCC)    • Rotator cuff injury     bilat   • Varicose vein of leg     left lower leg     Past Surgical History:   Procedure Laterality Date   • APPENDECTOMY     • CARDIAC CATHETERIZATION     • CARDIAC SURGERY      pacemaker   • CARDIOVASCULAR STRESS TEST  04/21/2014    Dr SANDRA GÓMEZ 58%, pos for ischemia    • CARDIOVASCULAR STRESS TEST  06/26/2018    [facility]- nuclear stress test reported as normal. LVEF 62%   • COLONOSCOPY     • CONVERTED (HISTORICAL) HOLTER  03/04/2015    16 beat run of PAF baseline sinus dago at 50, no symptoms recorded   • CONVERTED (HISTORICAL) HOLTER  07/31/2018    72 hour- baseline sinus- minium 37 bpm and max 96 bpm, 36 pauses noted longest 2.3 sec, 4 beat SVT   • ECHO - CONVERTED  04/16/2014    heart and vascular Dr SANDRA GÓMEZ 65%, mild AR   • ENDOSCOPY     • FOOT SURGERY     • HERNIA REPAIR      • OTHER SURGICAL HISTORY  2015    Ecardio- sinus dago, continue observation   • PACEMAKER IMPLANTATION     • SHOULDER ARTHROSCOPY W/ ROTATOR CUFF REPAIR Right 3/19/2019    Procedure: SHOULDER ARTHROSCOPY WITH  OPEN ROTATOR CUFF REPAIR, ACOMIOPLASTY;  Surgeon: Tylor Berry MD;  Location: [facility];  Service: Orthopedics   • SHOULDER ARTHROSCOPY W/ ROTATOR CUFF REPAIR Left 7/11/2019    Procedure: SHOULDER ARTHROSCOPY WITH MINI OPEN ROTATOR CUFF REPAIR, ACROMIOPLASTY;  Surgeon: Tylor Berry MD;  Location: [facility];  Service: Orthopedics     Family History   Problem Relation Age of Onset   • Lung disease Mother    • Cancer Sister    • Cancer Brother      Social History     Tobacco Use   • Smoking status: Never Smoker   • Smokeless tobacco: Current User     Types: Chew   • Tobacco comment: patient counseled on effectsof tobacco use on health.    Substance Use Topics   • Alcohol use: No   • Drug use: No     Medications Prior to Admission   Medication Sig Dispense Refill Last Dose   • alfuzosin (UROXATRAL) 10 MG 24 hr tablet Take 10 mg by mouth Every Night.      • apixaban (ELIQUIS) 5 MG tablet tablet Take 1 tablet by mouth Every 12 (Twelve) Hours. 180 tablet 3    • dicyclomine (BENTYL) 20 MG tablet Take 20 mg by mouth 2 (Two) Times a Day.      • ferrous sulfate 325 (65 FE) MG tablet Take 325 mg by mouth Daily With Breakfast.      • lansoprazole (PREVACID) 30 MG capsule Take 30 mg by mouth 2 (two) times a day.      • metFORMIN (GLUCOPHAGE) 500 MG tablet Take 500 mg by mouth daily with breakfast.      • mirtazapine (REMERON) 15 MG tablet Take 15 mg by mouth Every Night.      • pregabalin (LYRICA) 150 MG capsule Take 150 mg by mouth 2 (Two) Times a Day.      • rOPINIRole (REQUIP) 0.25 MG tablet Take 0.25 mg by mouth 2 (Two) Times a Day. Take 1 hour before bedtime.       • zolpidem (AMBIEN) 5 MG tablet Take 5 mg by mouth At Night As Needed for Sleep.        Allergies:  Patient has no known  allergies.    Objective     Vital Signs  Temp:  [97.9 °F (36.6 °C)-99.1 °F (37.3 °C)] 99.1 °F (37.3 °C)  Heart Rate:  [60-68] 63  Resp:  [16-20] 20  BP: ()/() 146/62  Body mass index is 23.42 kg/m².    Physical Exam:  Physical Exam  Constitutional:       General: He is not in acute distress.     Appearance: Normal appearance. He is not toxic-appearing.   HENT:      Head: Normocephalic and atraumatic.      Right Ear: External ear normal.      Left Ear: External ear normal.      Nose: Nose normal.      Mouth/Throat:      Mouth: Mucous membranes are moist.      Pharynx: Oropharynx is clear.   Eyes:      Extraocular Movements: Extraocular movements intact.      Conjunctiva/sclera: Conjunctivae normal.      Pupils: Pupils are equal, round, and reactive to light.   Neck:      Musculoskeletal: Normal range of motion and neck supple. No muscular tenderness.   Cardiovascular:      Rate and Rhythm: Tachycardia present.      Pulses: Normal pulses.      Heart sounds: No murmur.   Pulmonary:      Effort: Pulmonary effort is normal.      Breath sounds: Normal breath sounds. No wheezing, rhonchi or rales.   Abdominal:      General: Abdomen is flat. Bowel sounds are normal. There is no distension.      Palpations: Abdomen is soft.      Tenderness: There is no abdominal tenderness.   Musculoskeletal: Normal range of motion.         General: No swelling, tenderness or deformity.   Lymphadenopathy:      Cervical: No cervical adenopathy.   Skin:     General: Skin is warm and dry.      Capillary Refill: Capillary refill takes less than 2 seconds.      Coloration: Skin is not pale.      Findings: No erythema or rash.   Neurological:      General: No focal deficit present.      Mental Status: He is alert and oriented to person, place, and time.   Psychiatric:         Mood and Affect: Mood normal.         Behavior: Behavior normal.         Thought Content: Thought content normal.         Judgment: Judgment normal.            Results Review:       Lab Results:  Results from last 7 days   Lab Units 09/15/20  1458   WBC 10*3/mm3 11.90*   HEMOGLOBIN g/dL 12.1*   PLATELETS 10*3/mm3 194     Results from last 7 days   Lab Units 09/15/20  1458   CRP mg/dL 0.47     Results from last 7 days   Lab Units 09/15/20  1458   SODIUM mmol/L 144   POTASSIUM mmol/L 4.0   CHLORIDE mmol/L 109*   CO2 mmol/L 23.7   BUN mg/dL 17   CREATININE mg/dL 1.37*   CALCIUM mg/dL 9.6   GLUCOSE mg/dL 162*         Hemoglobin A1C   Date Value Ref Range Status   09/15/2020 5.90 (H) 4.80 - 5.60 % Final     Results from last 7 days   Lab Units 09/15/20  1458   BILIRUBIN mg/dL 0.4   ALK PHOS U/L 90   AST (SGOT) U/L 19   ALT (SGPT) U/L 14     Results from last 7 days   Lab Units 09/15/20  1458   TROPONIN T ng/mL <0.010                   I have reviewed the patient's laboratory results.    Imaging:  Imaging Results (Last 72 Hours)     Procedure Component Value Units Date/Time    CT Abdomen Pelvis Stone Protocol [[id number]] Collected: 09/15/20 1740     Updated: 09/15/20 1954    Narrative:      EXAM: CT ABDOMEN PELVIS STONE PROTOCOL-            TECHNIQUE: Multiple axial CT images were obtained from lung bases  through pubic symphysis WITHOUT administration of IV contrast.  Reformatted images in the coronal and/or sagittal plane(s) were  generated from the axial data set to facilitate diagnostic accuracy  and/or surgical planning.  Oral Contrast:NONE.     Radiation dose reduction techniques were utilized per ALARA protocol.  Automated exposure control was initiated through either or [employer] or  [employer] software packages by  protocol.       DOSE: 901.19 mGy.cm     Clinical information  Flank pain, stone disease suspected      Comparison  NONE.     FINDINGS:     Lower thorax:   Clear. No effusions.     Abdomen:     Liver:   Homogeneous. No focal hepatic mass or ductal dilatation.     Gallbladder:   Stone in the cystic duct     Pancreas:   Unremarkable. No mass  or ductal dilatation.     Spleen:   Homogeneous. No splenomegaly.     Adrenals:   No mass.     Kidneys/ureters:   Extensive perinephric stranding on the right. Mixed density collection  intimate with the posterior cortex of the right kidney most likely  representing a large right-sided renal hematoma.     Nonobstructing left intrarenal stone is present.     GI tract:   Non-dilated. No definite wall thickening.     Peritoneum:   No free air. No free fluid or loculated fluid collections.     Mesentery:   Unremarkable.     Lymph nodes:   No lymphadenopathy.     Vasculature:   No evidence of aneurysm.     Abdominal wall:   No focal hernia or mass.        Other: None.     Pelvis:     Bladder:   No focal mass or significant wall thickening     Reproductive:   Unremarkable as visualized.     Appendix:   No evidence of appendicitis     Bones:   No acute bony abnormality.       Impression:         1. Large mixed attenuation collection intimate with the posterior  inferior aspect of the right kidney compatible with large right-sided  renal hematoma. Extensive perinephric stranding on the right. No  hydronephrosis.     2.Cholelithiasis  3. Nonobstructing left intrarenal stone     I have discussed the results with the emergency department              This report was finalized on 9/15/2020 5:43 PM by Dr. Bryce Bianchi MD.       XR Chest 1 View [[id number]] Collected: 09/15/20 1527     Updated: 09/15/20 1553    Narrative:      XR CHEST 1 VW-     CLINICAL INDICATION: syncope        COMPARISON: 08/12/2019      TECHNIQUE: Single frontal view of the chest.     FINDINGS:     There is no focal alveolar infiltrate or effusion.  The cardiac silhouette is normal. The pulmonary vasculature is  unremarkable.  There is no evidence of an acute osseous abnormality.   There are no suspicious-appearing parenchymal soft tissue nodules.          Impression:      No evidence of active or acute cardiopulmonary disease on today's chest  radiograph.      This report was finalized on 9/15/2020 3:27 PM by Dr. Bryce Bianchi MD.             I have personally reviewed the patient's radiologic imaging.        EKG: Atrial paced rhythm with prolonged AV conduction, HR 60, QTc 442. Left anterior fascicular block. Nonspecific ST and T wave abnormality per electronic interpretation. III, AVF, V5 and V6 per my review.     I have personally reviewed the patient's EKG.        Assessment/Plan     - Syncope with collapse: orthostatic vitals in the ED were borderline, with drop in systolic BP of 19mmHg from sitting to standing. Will monitor orthostatic vitals qshift while hydrating with IV fluids. Trend troponin to rule out MI. Obtain ECHO and carotid doppler in the morning to evaluate further. In light of EKG changes and history of CAD, will consult cardiology to evaluate further. Requested pacemaker interrogation as well. Preliminary med list includes alfuzosin which can be associated with orthostatic hypotension and syncope; will hold for now.   - Large right perinephric hematoma, with stranding suggesting co-existing pyelonephritis as well: treat with IV rocephin. Hold home eliquis. Urology contacted by ED and will see patient in consultation. Continue to monitor H/H closely. Follow up urine and blood culture results.   - Mild BECKIE: creatinine 1.37, up from baseline of 0.93. No evidence of bladder outlet obstruction on CT abdomen/pelvis. Hydrate with IV fluids. Avoid nephrotoxic meds. Repeat labs in the morning.   - Type II DM, non insulin dependent: A1c 5.9 indicating optimal control. Hold home metformin for now. Monitor accuchecks and cover with SSI as needed.   - paroxysmal atrial fibrillation: currently paced. Anticoagulated with eliquis which is being held starting now as noted above.     DVT Prophylaxis: anticoagulated with eliquis, holding for now as noted above though should be in his system for another couple days    Estimated Length of Stay >2 midnights    I discussed  the patient's findings, assessment and plan with the patient and his RN Viviana who was present during my interview and exam on [facility].    * patient is high risk due to syncope, history CAD and afib s/p pacemaker, large right perinephric hematoma in setting of chronic anticoagulation, coexisting pyelonephritis/UTI, BECKIE    Kris Roldan MD  09/15/20  21:49 EDT

## 2020-09-16 NOTE — PROGRESS NOTES
Discharge Planning Assessment   [place]     Patient Name: Pavel Claudio  MRN: [id number]  Today's Date: 9/16/2020    Admit Date: 9/15/2020    Discharge Needs Assessment     Row Name 09/16/20 1232       Living Environment    Lives With  spouse    Name(s) of Who Lives With Patient  Pt lives with his spouse, Yuly.    Current Living Arrangements  home/apartment/condo    Primary Care Provided by  self    Provides Primary Care For  no one    Family Caregiver if Needed  child(dawson), adult;spouse    Able to Return to Prior Arrangements  yes       Resource/Environmental Concerns    Transportation Concerns  car, none       Transition Planning    Patient/Family Anticipates Transition to  home with family    Transportation Anticipated  family or friend will provide       Discharge Needs Assessment    Equipment Currently Used at Home  none    Equipment Needed After Discharge  none        Discharge Plan     Row Name 09/16/20 1990       Plan    Plan  SS spoke with pt on this day. Pt lives at home with his spouse, Yuly. Pt does not receive  services or use any DME. Pt does not have a POA or a living will. Pt's PCP is Siobhan stroud. Pt plans to return home at dischage, with transportation provided by his daughter. SS will follow and assist as needed.    Patient/Family in Agreement with Plan  yes          Demographic Summary     Row Name 09/16/20 1232       General Information    Admission Type  inpatient    Referral Source  nursing    Reason for Consult  discharge planning    Preferred Language  English     Used During This Interaction  no          EDE Marrero

## 2020-09-16 NOTE — PLAN OF CARE
Goal Outcome Evaluation:   CT completed this shift, pacemaker interrogated. Will cont to monitor

## 2020-09-17 LAB
BH CV ECHO MEAS - ACS: 2 CM
BH CV ECHO MEAS - AI DEC SLOPE: 202 CM/SEC^2
BH CV ECHO MEAS - AI MAX PG: 81.7 MMHG
BH CV ECHO MEAS - AI MAX VEL: 452 CM/SEC
BH CV ECHO MEAS - AI P1/2T: 655.4 MSEC
BH CV ECHO MEAS - AO MAX PG: 9.9 MMHG
BH CV ECHO MEAS - AO MEAN PG: 6 MMHG
BH CV ECHO MEAS - AO ROOT AREA (BSA CORRECTED): 1.9
BH CV ECHO MEAS - AO ROOT AREA: 10.8 CM^2
BH CV ECHO MEAS - AO ROOT DIAM: 3.7 CM
BH CV ECHO MEAS - AO V2 MAX: 157 CM/SEC
BH CV ECHO MEAS - AO V2 MEAN: 115 CM/SEC
BH CV ECHO MEAS - AO V2 VTI: 33.6 CM
BH CV ECHO MEAS - BSA(HAYCOCK): 1.9 M^2
BH CV ECHO MEAS - BSA: 1.9 M^2
BH CV ECHO MEAS - BZI_BMI: 23.4 KILOGRAMS/M^2
BH CV ECHO MEAS - BZI_METRIC_HEIGHT: 177.8 CM
BH CV ECHO MEAS - BZI_METRIC_WEIGHT: 73.9 KG
BH CV ECHO MEAS - EDV(CUBED): 122 ML
BH CV ECHO MEAS - EDV(MOD-SP4): 93.4 ML
BH CV ECHO MEAS - EDV(TEICH): 116.1 ML
BH CV ECHO MEAS - EF(CUBED): 65.2 %
BH CV ECHO MEAS - EF(MOD-SP4): 76.9 %
BH CV ECHO MEAS - EF(TEICH): 56.5 %
BH CV ECHO MEAS - ESV(CUBED): 42.5 ML
BH CV ECHO MEAS - ESV(MOD-SP4): 21.6 ML
BH CV ECHO MEAS - ESV(TEICH): 50.5 ML
BH CV ECHO MEAS - FS: 29.6 %
BH CV ECHO MEAS - IVS/LVPW: 0.9
BH CV ECHO MEAS - IVSD: 1.1 CM
BH CV ECHO MEAS - LA DIMENSION: 4.2 CM
BH CV ECHO MEAS - LA/AO: 1.1
BH CV ECHO MEAS - LV DIASTOLIC VOL/BSA (35-75): 48.8 ML/M^2
BH CV ECHO MEAS - LV MASS(C)D: 212.2 GRAMS
BH CV ECHO MEAS - LV MASS(C)DI: 110.9 GRAMS/M^2
BH CV ECHO MEAS - LV SYSTOLIC VOL/BSA (12-30): 11.3 ML/M^2
BH CV ECHO MEAS - LVIDD: 5 CM
BH CV ECHO MEAS - LVIDS: 3.5 CM
BH CV ECHO MEAS - LVLD AP4: 7.6 CM
BH CV ECHO MEAS - LVLS AP4: 6.4 CM
BH CV ECHO MEAS - LVOT AREA (M): 3.5 CM^2
BH CV ECHO MEAS - LVOT AREA: 3.5 CM^2
BH CV ECHO MEAS - LVOT DIAM: 2.1 CM
BH CV ECHO MEAS - LVPWD: 1.2 CM
BH CV ECHO MEAS - MV A MAX VEL: 97.4 CM/SEC
BH CV ECHO MEAS - MV E MAX VEL: 88.1 CM/SEC
BH CV ECHO MEAS - MV E/A: 0.9
BH CV ECHO MEAS - PA ACC TIME: 0.12 SEC
BH CV ECHO MEAS - PA PR(ACCEL): 23.7 MMHG
BH CV ECHO MEAS - RAP SYSTOLE: 10 MMHG
BH CV ECHO MEAS - RVSP: 42.4 MMHG
BH CV ECHO MEAS - SI(AO): 188.8 ML/M^2
BH CV ECHO MEAS - SI(CUBED): 41.6 ML/M^2
BH CV ECHO MEAS - SI(MOD-SP4): 37.5 ML/M^2
BH CV ECHO MEAS - SI(TEICH): 34.2 ML/M^2
BH CV ECHO MEAS - SV(AO): 361.3 ML
BH CV ECHO MEAS - SV(CUBED): 79.5 ML
BH CV ECHO MEAS - SV(MOD-SP4): 71.8 ML
BH CV ECHO MEAS - SV(TEICH): 65.5 ML
BH CV ECHO MEAS - TR MAX VEL: 284.7 CM/SEC
LV EF 2D ECHO EST: 65 %
MAXIMAL PREDICTED HEART RATE: 146 BPM
STRESS TARGET HR: 124 BPM

## 2020-09-20 LAB
BACTERIA SPEC AEROBE CULT: NORMAL
BACTERIA SPEC AEROBE CULT: NORMAL

## 2020-10-19 ENCOUNTER — OFFICE VISIT (OUTPATIENT)
Dept: UROLOGY | Facility: CLINIC | Age: 75
End: 2020-10-19

## 2020-10-19 VITALS — TEMPERATURE: 98.3 F | WEIGHT: 164 LBS | BODY MASS INDEX: 23.48 KG/M2 | HEIGHT: 70 IN

## 2020-10-19 DIAGNOSIS — N28.89 RENAL MASS: Primary | ICD-10-CM

## 2020-10-19 DIAGNOSIS — S37.019A PERINEPHRIC HEMATOMA: ICD-10-CM

## 2020-10-19 PROCEDURE — 99213 OFFICE O/P EST LOW 20 MIN: CPT | Performed by: UROLOGY

## 2020-10-19 NOTE — PROGRESS NOTES
Chief Complaint:          Chief Complaint   Patient presents with   • Nephrolithiasis     Hospital fu        HPI:   74 y.o. male here for follow-up.  Apparently was seen in the hospital.  He had a renal bleed.  He is here for follow[facility]up [facility] he is [facility] Eliquis he was COVID-19 positive he is atrial fibrillation apparently on the Eliquis for his atrial fib.  I recommended an MRI but he has metal in his foot metal in his head and apparently can have it so I Valeria get a renal mass protocol CT scan and follow-up with him based on this.  My gut feeling is there is nothing in there but given the fact that he had a renal bleed in 1 to be sure there is not a mass inside that bleed because I have no other etiology for it other than coagulation.      Past Medical History:        Past Medical History:   Diagnosis Date   • Agent orange exposure    • Anemia    • Anxiety    • Arthritis    • BPH (benign prostatic hyperplasia)    • CAD (coronary artery disease)    • Cheekbone fracture (CMS/HCC)     surgical repair    • Diabetes mellitus (CMS/HCC)    • GERD (gastroesophageal reflux disease)    • H/O foot surgery     left foot orif   • History of hernia surgery     right inguinal   • Hx of Damon Mountain spotted fever    • Pacemaker    • PAF (paroxysmal atrial fibrillation) (CMS/HCC)    • Rotator cuff injury     bilat   • Varicose vein of leg     left lower leg         Current Meds:     Current Outpatient Medications   Medication Sig Dispense Refill   • alfuzosin (UROXATRAL) 10 MG 24 hr tablet Take 10 mg by mouth Daily.     • dicyclomine (BENTYL) 20 MG tablet Take 20 mg by mouth 4 (Four) Times a Day As Needed (stomach cramps).     • ferrous sulfate 325 (65 FE) MG tablet Take 325 mg by mouth Daily With Breakfast.     • lansoprazole (PREVACID) 30 MG capsule Take 30 mg by mouth Daily.     • metFORMIN (GLUCOPHAGE) 500 MG tablet Take 500 mg by mouth daily with breakfast.     • pregabalin (LYRICA) 150 MG capsule Take 150 mg by  mouth 2 (Two) Times a Day.     • rOPINIRole (REQUIP) 0.25 MG tablet Take 0.75 mg by mouth 2 (Two) Times a Day.     • sertraline (ZOLOFT) 100 MG tablet Take 100 mg by mouth Daily.     • vitamin C (ASCORBIC ACID) 500 MG tablet Take 500 mg by mouth Daily.     • zolpidem (AMBIEN) 5 MG tablet Take 5 mg by mouth At Night As Needed for Sleep.       No current facility-administered medications for this visit.         Allergies:      No Known Allergies     Past Surgical History:     Past Surgical History:   Procedure Laterality Date   • APPENDECTOMY     • CARDIAC CATHETERIZATION     • CARDIAC SURGERY      pacemaker   • CARDIOVASCULAR STRESS TEST  04/21/2014    Dr SANDRA GÓMEZ 58%, pos for ischemia    • CARDIOVASCULAR STRESS TEST  06/26/2018    [facility]- nuclear stress test reported as normal. LVEF 62%   • COLONOSCOPY     • CONVERTED (HISTORICAL) HOLTER  03/04/2015    16 beat run of PAF baseline sinus dago at 50, no symptoms recorded   • CONVERTED (HISTORICAL) HOLTER  07/31/2018    72 hour- baseline sinus- minium 37 bpm and max 96 bpm, 36 pauses noted longest 2.3 sec, 4 beat SVT   • ECHO - CONVERTED  04/16/2014    heart and vascular Dr SANDRA GÓMEZ 65%, mild AR   • ENDOSCOPY     • FOOT SURGERY     • HERNIA REPAIR     • OTHER SURGICAL HISTORY  2015    Ecardio- sinus dago, continue observation   • PACEMAKER IMPLANTATION     • SHOULDER ARTHROSCOPY W/ ROTATOR CUFF REPAIR Right 3/19/2019    Procedure: SHOULDER ARTHROSCOPY WITH  OPEN ROTATOR CUFF REPAIR, ACOMIOPLASTY;  Surgeon: Tylor Berry MD;  Location: [facility];  Service: Orthopedics   • SHOULDER ARTHROSCOPY W/ ROTATOR CUFF REPAIR Left 7/11/2019    Procedure: SHOULDER ARTHROSCOPY WITH MINI OPEN ROTATOR CUFF REPAIR, ACROMIOPLASTY;  Surgeon: Tylor Berry MD;  Location: [facility];  Service: Orthopedics         Social History:     Social History     Socioeconomic History   • Marital status:      Spouse name: Not on file   • Number of children: Not on file   •  Years of education: Not on file   • Highest education level: Not on file   Tobacco Use   • Smoking status: Never Smoker   • Smokeless tobacco: Current User     Types: Chew   • Tobacco comment: patient counseled on effectsof tobacco use on health.    Substance and Sexual Activity   • Alcohol use: No   • Drug use: No   • Sexual activity: Defer     Partners: Female     Birth control/protection: None       Family History:     Family History   Problem Relation Age of Onset   • Lung disease Mother    • Cancer Sister    • Cancer Brother        Review of Systems:     Review of Systems   Constitutional: Negative.    HENT: Negative.    Eyes: Negative.    Respiratory: Negative.    Cardiovascular: Negative.    Gastrointestinal: Negative.    Endocrine: Negative.    Musculoskeletal: Negative.    Allergic/Immunologic: Negative.    Neurological: Negative.    Hematological: Negative.    Psychiatric/Behavioral: Negative.        Physical Exam:     Physical Exam  Vitals signs and nursing note reviewed.   Constitutional:       Appearance: He is well-developed.   HENT:      Head: Normocephalic and atraumatic.   Eyes:      Conjunctiva/sclera: Conjunctivae normal.      Pupils: Pupils are equal, round, and reactive to light.   Neck:      Musculoskeletal: Normal range of motion.   Cardiovascular:      Rate and Rhythm: Normal rate and regular rhythm.      Heart sounds: Normal heart sounds.   Pulmonary:      Effort: Pulmonary effort is normal.      Breath sounds: Normal breath sounds.   Abdominal:      General: Bowel sounds are normal.      Palpations: Abdomen is soft.   Musculoskeletal: Normal range of motion.   Skin:     General: Skin is warm and dry.   Neurological:      Mental Status: He is alert and oriented to person, place, and time.      Deep Tendon Reflexes: Reflexes are normal and symmetric.   Psychiatric:         Behavior: Behavior normal.         Thought Content: Thought content normal.         Judgment: Judgment normal.         I  have reviewed the following portions of the patient's history: allergies, current medications, past family history, past medical history, past social history, past surgical history, problem list and ROS and confirm it's accurate.      Procedure:       Assessment/Plan:   Renal hematoma-spontaneous in the presence of atrial fibrillation and Eliquis.  I am going to recommend a repeat renal mass protocol CT to rule out renal neoplasm            Patient's Body mass index is 23.53 kg/m². BMI is within normal parameters. No follow-up required..              This document has been electronically signed by YEIMI PEREZ MD October 19, 2020 13:12 EDT

## 2020-10-20 ENCOUNTER — TELEPHONE (OUTPATIENT)
Dept: UROLOGY | Facility: CLINIC | Age: 75
End: 2020-10-20

## 2020-10-20 NOTE — TELEPHONE ENCOUNTER
Patient's daughter Connie Sparks is calling in. Saying that she thinks patient wants to [facility], and go through everything with them, but she will make sure and call us back.

## 2020-10-21 ENCOUNTER — TELEPHONE (OUTPATIENT)
Dept: UROLOGY | Facility: CLINIC | Age: 75
End: 2020-10-21

## 2020-10-21 NOTE — TELEPHONE ENCOUNTER
Patient said he was supposed to be scheduled for an ultrasound. He said he needs to cancel it, he will be having it done in [place].

## 2022-05-11 ENCOUNTER — TRANSCRIBE ORDERS (OUTPATIENT)
Dept: ADMINISTRATIVE | Facility: HOSPITAL | Age: 77
End: 2022-05-11

## 2022-05-11 DIAGNOSIS — R10.9 STOMACH ACHE: Primary | ICD-10-CM

## 2022-06-01 ENCOUNTER — HOSPITAL ENCOUNTER (OUTPATIENT)
Dept: ULTRASOUND IMAGING | Facility: HOSPITAL | Age: 77
Discharge: HOME | End: 2022-06-01
Admitting: PHYSICIAN ASSISTANT

## 2022-06-01 DIAGNOSIS — R10.9 STOMACH ACHE: ICD-10-CM

## 2022-06-01 PROCEDURE — 76705 ECHO EXAM OF ABDOMEN: CPT | Performed by: RADIOLOGY

## 2022-06-01 PROCEDURE — 76705 ECHO EXAM OF ABDOMEN: CPT

## 2022-08-12 ENCOUNTER — HOSPITAL ENCOUNTER (OUTPATIENT)
Dept: CT IMAGING | Facility: HOSPITAL | Age: 77
Discharge: HOME | End: 2022-08-12
Admitting: NURSE PRACTITIONER

## 2022-08-12 ENCOUNTER — TRANSCRIBE ORDERS (OUTPATIENT)
Dept: ADMINISTRATIVE | Facility: HOSPITAL | Age: 77
End: 2022-08-12

## 2022-08-12 DIAGNOSIS — R94.5 NONSPECIFIC ABNORMAL RESULTS OF LIVER FUNCTION STUDY: ICD-10-CM

## 2022-08-12 DIAGNOSIS — R94.5 NONSPECIFIC ABNORMAL RESULTS OF LIVER FUNCTION STUDY: Primary | ICD-10-CM

## 2022-08-12 LAB — CREAT BLDA-MCNC: 1 MG/DL (ref 0.6–1.3)

## 2022-08-12 PROCEDURE — 25010000002 IOPAMIDOL 61 % SOLUTION: Performed by: NURSE PRACTITIONER

## 2022-08-12 PROCEDURE — 74170 CT ABD WO CNTRST FLWD CNTRST: CPT | Performed by: RADIOLOGY

## 2022-08-12 PROCEDURE — 82565 ASSAY OF CREATININE: CPT

## 2022-08-12 PROCEDURE — 74170 CT ABD WO CNTRST FLWD CNTRST: CPT

## 2022-08-12 RX ADMIN — IOPAMIDOL 81 ML: 612 INJECTION, SOLUTION INTRAVENOUS at 14:26

## 2022-08-29 ENCOUNTER — HOSPITAL ENCOUNTER (EMERGENCY)
Facility: HOSPITAL | Age: 77
Discharge: ANOTHER HOSPITAL | End: 2022-08-30
Attending: EMERGENCY MEDICINE | Admitting: EMERGENCY MEDICINE

## 2022-08-29 ENCOUNTER — APPOINTMENT (OUTPATIENT)
Dept: GENERAL RADIOLOGY | Facility: HOSPITAL | Age: 77
End: 2022-08-29

## 2022-08-29 ENCOUNTER — APPOINTMENT (OUTPATIENT)
Dept: ULTRASOUND IMAGING | Facility: HOSPITAL | Age: 77
End: 2022-08-29

## 2022-08-29 DIAGNOSIS — K81.0 ACUTE CHOLECYSTITIS: Primary | ICD-10-CM

## 2022-08-29 LAB
ALBUMIN SERPL-MCNC: 3.73 G/DL (ref 3.5–5.2)
ALBUMIN/GLOB SERPL: 1.4 G/DL
ALP SERPL-CCNC: 606 U/L (ref 39–117)
ALT SERPL W P-5'-P-CCNC: 94 U/L (ref 1–41)
ANION GAP SERPL CALCULATED.3IONS-SCNC: 12.3 MMOL/L (ref 5–15)
AST SERPL-CCNC: 106 U/L (ref 1–40)
BACTERIA UR QL AUTO: ABNORMAL /HPF
BASOPHILS # BLD AUTO: 0.04 10*3/MM3 (ref 0–0.2)
BASOPHILS NFR BLD AUTO: 0.6 % (ref 0–1.5)
BILIRUB SERPL-MCNC: 2.8 MG/DL (ref 0–1.2)
BILIRUB UR QL STRIP: ABNORMAL
BUN SERPL-MCNC: 13 MG/DL (ref 8–23)
BUN/CREAT SERPL: 13.8 (ref 7–25)
CALCIUM SPEC-SCNC: 9.2 MG/DL (ref 8.6–10.5)
CHLORIDE SERPL-SCNC: 108 MMOL/L (ref 98–107)
CLARITY UR: CLEAR
CO2 SERPL-SCNC: 20.7 MMOL/L (ref 22–29)
COLOR UR: ABNORMAL
CREAT SERPL-MCNC: 0.94 MG/DL (ref 0.76–1.27)
CRP SERPL-MCNC: 2.61 MG/DL (ref 0–0.5)
DEPRECATED RDW RBC AUTO: 54.4 FL (ref 37–54)
EGFRCR SERPLBLD CKD-EPI 2021: 84 ML/MIN/1.73
EOSINOPHIL # BLD AUTO: 0.15 10*3/MM3 (ref 0–0.4)
EOSINOPHIL NFR BLD AUTO: 2.2 % (ref 0.3–6.2)
ERYTHROCYTE [DISTWIDTH] IN BLOOD BY AUTOMATED COUNT: 15.5 % (ref 12.3–15.4)
GLOBULIN UR ELPH-MCNC: 2.7 GM/DL
GLUCOSE SERPL-MCNC: 91 MG/DL (ref 65–99)
GLUCOSE UR STRIP-MCNC: NEGATIVE MG/DL
HCT VFR BLD AUTO: 40.3 % (ref 37.5–51)
HGB BLD-MCNC: 13.1 G/DL (ref 13–17.7)
HGB UR QL STRIP.AUTO: NEGATIVE
HOLD SPECIMEN: NORMAL
HOLD SPECIMEN: NORMAL
HYALINE CASTS UR QL AUTO: ABNORMAL /LPF
IMM GRANULOCYTES # BLD AUTO: 0.04 10*3/MM3 (ref 0–0.05)
IMM GRANULOCYTES NFR BLD AUTO: 0.6 % (ref 0–0.5)
KETONES UR QL STRIP: ABNORMAL
LEUKOCYTE ESTERASE UR QL STRIP.AUTO: ABNORMAL
LIPASE SERPL-CCNC: 77 U/L (ref 13–60)
LYMPHOCYTES # BLD AUTO: 1.29 10*3/MM3 (ref 0.7–3.1)
LYMPHOCYTES NFR BLD AUTO: 19.2 % (ref 19.6–45.3)
MCH RBC QN AUTO: 31 PG (ref 26.6–33)
MCHC RBC AUTO-ENTMCNC: 32.5 G/DL (ref 31.5–35.7)
MCV RBC AUTO: 95.5 FL (ref 79–97)
MONOCYTES # BLD AUTO: 0.74 10*3/MM3 (ref 0.1–0.9)
MONOCYTES NFR BLD AUTO: 11 % (ref 5–12)
NEUTROPHILS NFR BLD AUTO: 4.45 10*3/MM3 (ref 1.7–7)
NEUTROPHILS NFR BLD AUTO: 66.4 % (ref 42.7–76)
NITRITE UR QL STRIP: NEGATIVE
NRBC BLD AUTO-RTO: 0 /100 WBC (ref 0–0.2)
PH UR STRIP.AUTO: 5.5 [PH] (ref 5–8)
PLATELET # BLD AUTO: 183 10*3/MM3 (ref 140–450)
PMV BLD AUTO: 12 FL (ref 6–12)
POTASSIUM SERPL-SCNC: 4.3 MMOL/L (ref 3.5–5.2)
PROT SERPL-MCNC: 6.4 G/DL (ref 6–8.5)
PROT UR QL STRIP: ABNORMAL
RBC # BLD AUTO: 4.22 10*6/MM3 (ref 4.14–5.8)
RBC # UR STRIP: ABNORMAL /HPF
REF LAB TEST METHOD: ABNORMAL
SODIUM SERPL-SCNC: 141 MMOL/L (ref 136–145)
SP GR UR STRIP: 1.02 (ref 1–1.03)
SQUAMOUS #/AREA URNS HPF: ABNORMAL /HPF
TROPONIN T SERPL-MCNC: <0.01 NG/ML (ref 0–0.03)
UROBILINOGEN UR QL STRIP: ABNORMAL
WBC # UR STRIP: ABNORMAL /HPF
WBC NRBC COR # BLD: 6.71 10*3/MM3 (ref 3.4–10.8)
WHOLE BLOOD HOLD COAG: NORMAL
WHOLE BLOOD HOLD SPECIMEN: NORMAL

## 2022-08-29 PROCEDURE — 96375 TX/PRO/DX INJ NEW DRUG ADDON: CPT

## 2022-08-29 PROCEDURE — 84484 ASSAY OF TROPONIN QUANT: CPT | Performed by: PHYSICIAN ASSISTANT

## 2022-08-29 PROCEDURE — 83690 ASSAY OF LIPASE: CPT | Performed by: PHYSICIAN ASSISTANT

## 2022-08-29 PROCEDURE — 71045 X-RAY EXAM CHEST 1 VIEW: CPT

## 2022-08-29 PROCEDURE — 85025 COMPLETE CBC W/AUTO DIFF WBC: CPT | Performed by: PHYSICIAN ASSISTANT

## 2022-08-29 PROCEDURE — 93005 ELECTROCARDIOGRAM TRACING: CPT | Performed by: PHYSICIAN ASSISTANT

## 2022-08-29 PROCEDURE — 99284 EMERGENCY DEPT VISIT MOD MDM: CPT

## 2022-08-29 PROCEDURE — 96374 THER/PROPH/DIAG INJ IV PUSH: CPT

## 2022-08-29 PROCEDURE — 80053 COMPREHEN METABOLIC PANEL: CPT | Performed by: PHYSICIAN ASSISTANT

## 2022-08-29 PROCEDURE — 76705 ECHO EXAM OF ABDOMEN: CPT

## 2022-08-29 PROCEDURE — 71045 X-RAY EXAM CHEST 1 VIEW: CPT | Performed by: RADIOLOGY

## 2022-08-29 PROCEDURE — 86140 C-REACTIVE PROTEIN: CPT | Performed by: PHYSICIAN ASSISTANT

## 2022-08-29 PROCEDURE — 81001 URINALYSIS AUTO W/SCOPE: CPT | Performed by: PHYSICIAN ASSISTANT

## 2022-08-29 RX ORDER — SODIUM CHLORIDE 0.9 % (FLUSH) 0.9 %
10 SYRINGE (ML) INJECTION AS NEEDED
Status: DISCONTINUED | OUTPATIENT
Start: 2022-08-29 | End: 2022-08-30 | Stop reason: HOSPADM

## 2022-08-29 RX ORDER — ONDANSETRON 2 MG/ML
4 INJECTION INTRAMUSCULAR; INTRAVENOUS ONCE
Status: COMPLETED | OUTPATIENT
Start: 2022-08-29 | End: 2022-08-30

## 2022-08-30 ENCOUNTER — APPOINTMENT (OUTPATIENT)
Dept: CT IMAGING | Facility: HOSPITAL | Age: 77
End: 2022-08-30

## 2022-08-30 VITALS
HEIGHT: 70 IN | RESPIRATION RATE: 18 BRPM | HEART RATE: 76 BPM | TEMPERATURE: 97.7 F | DIASTOLIC BLOOD PRESSURE: 91 MMHG | WEIGHT: 149 LBS | OXYGEN SATURATION: 97 % | SYSTOLIC BLOOD PRESSURE: 151 MMHG | BODY MASS INDEX: 21.33 KG/M2

## 2022-08-30 LAB
FLUAV RNA RESP QL NAA+PROBE: NOT DETECTED
FLUBV RNA RESP QL NAA+PROBE: NOT DETECTED
QT INTERVAL: 444 MS
QTC INTERVAL: 489 MS
SARS-COV-2 RNA RESP QL NAA+PROBE: NOT DETECTED

## 2022-08-30 PROCEDURE — 25010000002 IOPAMIDOL 61 % SOLUTION: Performed by: EMERGENCY MEDICINE

## 2022-08-30 PROCEDURE — 25010000002 MORPHINE PER 10 MG: Performed by: EMERGENCY MEDICINE

## 2022-08-30 PROCEDURE — 74177 CT ABD & PELVIS W/CONTRAST: CPT

## 2022-08-30 PROCEDURE — 96374 THER/PROPH/DIAG INJ IV PUSH: CPT

## 2022-08-30 PROCEDURE — 87636 SARSCOV2 & INF A&B AMP PRB: CPT | Performed by: PHYSICIAN ASSISTANT

## 2022-08-30 PROCEDURE — 25010000002 ONDANSETRON PER 1 MG: Performed by: EMERGENCY MEDICINE

## 2022-08-30 PROCEDURE — 96375 TX/PRO/DX INJ NEW DRUG ADDON: CPT

## 2022-08-30 RX ORDER — HYDRALAZINE HYDROCHLORIDE 20 MG/ML
10 INJECTION INTRAMUSCULAR; INTRAVENOUS ONCE
Status: DISCONTINUED | OUTPATIENT
Start: 2022-08-30 | End: 2022-08-30

## 2022-08-30 RX ADMIN — ONDANSETRON 4 MG: 2 INJECTION INTRAMUSCULAR; INTRAVENOUS at 00:08

## 2022-08-30 RX ADMIN — MORPHINE SULFATE 4 MG: 4 INJECTION, SOLUTION INTRAMUSCULAR; INTRAVENOUS at 00:11

## 2022-08-30 RX ADMIN — IOPAMIDOL 70 ML: 612 INJECTION, SOLUTION INTRAVENOUS at 00:46

## (undated) DEVICE — BNDG ELAS CO-FLEX SLF ADHR 4IN5YD LF STRL

## (undated) DEVICE — PAD GRND REM POLYHESIVE A/ DISP

## (undated) DEVICE — APPL CHLORAPREP W/TINT 26ML ORNG

## (undated) DEVICE — MAT FLR ABSORBENT LG 4FT 10 2.5FT

## (undated) DEVICE — NDL SPINE 17G 3 1/2IN TUOHY

## (undated) DEVICE — OSCILLATING SAW BLADE, 9MM X 24.6MM X 0.64MM: Brand: MICROAIRE®

## (undated) DEVICE — SHOULDER IMMOBILIZER: Brand: DEROYAL

## (undated) DEVICE — NDL MAYO CAT GUT 1/2 CIR TPR

## (undated) DEVICE — MARKR SKIN W/RULR AND LBL

## (undated) DEVICE — CARBIDE BUR, OVAL CUTTING, 10 FLUTES, LONG, 5.5MM DI: Brand: MICROAIRE®

## (undated) DEVICE — BLD CUT FORMLA AGGR PLS 4.0MM

## (undated) DEVICE — BNDG ADHS CURAD FLX/FABRC 2X4IN STRL LF

## (undated) DEVICE — ENCORE® LATEX MICRO SIZE 8, STERILE LATEX POWDER-FREE SURGICAL GLOVE: Brand: ENCORE

## (undated) DEVICE — INTENDED FOR TISSUE SEPARATION, AND OTHER PROCEDURES THAT REQUIRE A SHARP SURGICAL BLADE TO PUNCTURE OR CUT.: Brand: BARD-PARKER ® STAINLESS STEEL BLADES

## (undated) DEVICE — PROXIMATE RH ROTATING HEAD SKIN STAPLERS (35 WIDE) CONTAINS 35 STAINLESS STEEL STAPLES: Brand: PROXIMATE

## (undated) DEVICE — 4-PORT MANIFOLD: Brand: NEPTUNE 2

## (undated) DEVICE — DRSNG WND STRIP OPTIFOAM AG A/MIC LF 3.5X6IN STRL

## (undated) DEVICE — HOLDER: Brand: DEROYAL

## (undated) DEVICE — NDL HYPO ECLPS SFTY 22G 1 1/2IN

## (undated) DEVICE — SUT ETHLN 4/0 PS2 PLSTC 1667G

## (undated) DEVICE — SYR LUERLOK 50ML

## (undated) DEVICE — GAUZE,SPONGE,4"X4",16PLY,XRAY,STRL,LF: Brand: MEDLINE

## (undated) DEVICE — NDL SPINE 22G 31/2IN BLK

## (undated) DEVICE — SUT ETHIB NO 2 X519H

## (undated) DEVICE — PK SHLDR 70

## (undated) DEVICE — TBG PUMP ARTHSCP MAIN AR6400 16FT

## (undated) DEVICE — SYR LUERLOK 30CC

## (undated) DEVICE — CVR HNDL LT CAMERA STRYKER

## (undated) DEVICE — WRP COMPR SHLDR COLD UNIV